# Patient Record
Sex: MALE | Race: WHITE | NOT HISPANIC OR LATINO | Employment: OTHER | URBAN - NONMETROPOLITAN AREA
[De-identification: names, ages, dates, MRNs, and addresses within clinical notes are randomized per-mention and may not be internally consistent; named-entity substitution may affect disease eponyms.]

---

## 2018-07-10 ENCOUNTER — HOSPITAL ENCOUNTER (INPATIENT)
Facility: HOSPITAL | Age: 48
LOS: 1 days | Discharge: HOME | End: 2018-07-11
Attending: EMERGENCY MEDICINE | Admitting: PSYCHIATRY & NEUROLOGY

## 2018-07-10 ENCOUNTER — APPOINTMENT (OUTPATIENT)
Dept: CARDIOLOGY | Facility: HOSPITAL | Age: 48
End: 2018-07-10
Attending: PSYCHIATRY & NEUROLOGY

## 2018-07-10 ENCOUNTER — APPOINTMENT (OUTPATIENT)
Dept: CT IMAGING | Facility: HOSPITAL | Age: 48
End: 2018-07-10

## 2018-07-10 ENCOUNTER — APPOINTMENT (OUTPATIENT)
Dept: MRI IMAGING | Facility: HOSPITAL | Age: 48
End: 2018-07-10

## 2018-07-10 DIAGNOSIS — R13.10 DYSPHAGIA, UNSPECIFIED TYPE: Primary | ICD-10-CM

## 2018-07-10 DIAGNOSIS — I63.9 CEREBROVASCULAR ACCIDENT (CVA), UNSPECIFIED MECHANISM (HCC): ICD-10-CM

## 2018-07-10 PROBLEM — IMO0002 STROKE SYNDROME: Status: ACTIVE | Noted: 2018-07-10

## 2018-07-10 LAB
ALBUMIN SERPL-MCNC: 4 G/DL (ref 3.5–5)
ALBUMIN SERPL-MCNC: 4.6 G/DL (ref 3.5–5)
ALBUMIN/GLOB SERPL: 1.5 G/DL (ref 1.1–2.5)
ALBUMIN/GLOB SERPL: 1.6 G/DL (ref 1.1–2.5)
ALP SERPL-CCNC: 71 U/L (ref 24–120)
ALP SERPL-CCNC: 75 U/L (ref 24–120)
ALT SERPL W P-5'-P-CCNC: 33 U/L (ref 0–54)
ALT SERPL W P-5'-P-CCNC: 39 U/L (ref 0–54)
ANION GAP SERPL CALCULATED.3IONS-SCNC: 12 MMOL/L (ref 4–13)
ANION GAP SERPL CALCULATED.3IONS-SCNC: 14 MMOL/L (ref 4–13)
APTT PPP: 28.1 SECONDS (ref 24.1–34.8)
AST SERPL-CCNC: 26 U/L (ref 7–45)
AST SERPL-CCNC: 26 U/L (ref 7–45)
BILIRUB SERPL-MCNC: 0.2 MG/DL (ref 0.1–1)
BILIRUB SERPL-MCNC: 0.4 MG/DL (ref 0.1–1)
BUN BLD-MCNC: 7 MG/DL (ref 5–21)
BUN BLD-MCNC: 8 MG/DL (ref 5–21)
BUN/CREAT SERPL: 7.6 (ref 7–25)
BUN/CREAT SERPL: 9.5 (ref 7–25)
CALCIUM SPEC-SCNC: 8.8 MG/DL (ref 8.4–10.4)
CALCIUM SPEC-SCNC: 9.3 MG/DL (ref 8.4–10.4)
CHLORIDE SERPL-SCNC: 103 MMOL/L (ref 98–110)
CHLORIDE SERPL-SCNC: 103 MMOL/L (ref 98–110)
CO2 SERPL-SCNC: 25 MMOL/L (ref 24–31)
CO2 SERPL-SCNC: 25 MMOL/L (ref 24–31)
CREAT BLD-MCNC: 0.84 MG/DL (ref 0.5–1.4)
CREAT BLD-MCNC: 0.92 MG/DL (ref 0.5–1.4)
DEPRECATED RDW RBC AUTO: 41.3 FL (ref 40–54)
DEPRECATED RDW RBC AUTO: 42.5 FL (ref 40–54)
EOSINOPHIL # BLD MANUAL: 0.11 10*3/MM3 (ref 0–0.7)
EOSINOPHIL NFR BLD MANUAL: 1 % (ref 0–4)
ERYTHROCYTE [DISTWIDTH] IN BLOOD BY AUTOMATED COUNT: 13.3 % (ref 12–15)
ERYTHROCYTE [DISTWIDTH] IN BLOOD BY AUTOMATED COUNT: 13.4 % (ref 12–15)
GFR SERPL CREATININE-BSD FRML MDRD: 87 ML/MIN/1.73
GFR SERPL CREATININE-BSD FRML MDRD: 97 ML/MIN/1.73
GLOBULIN UR ELPH-MCNC: 2.6 GM/DL
GLOBULIN UR ELPH-MCNC: 2.9 GM/DL
GLUCOSE BLD-MCNC: 102 MG/DL (ref 70–100)
GLUCOSE BLD-MCNC: 97 MG/DL (ref 70–100)
GLUCOSE BLDC GLUCOMTR-MCNC: 141 MG/DL (ref 70–130)
GLUCOSE BLDC GLUCOMTR-MCNC: 97 MG/DL (ref 70–130)
GLUCOSE BLDC GLUCOMTR-MCNC: 97 MG/DL (ref 70–130)
HCT VFR BLD AUTO: 42.5 % (ref 40–52)
HCT VFR BLD AUTO: 46.4 % (ref 40–52)
HGB BLD-MCNC: 14.6 G/DL (ref 14–18)
HGB BLD-MCNC: 16 G/DL (ref 14–18)
INR PPP: 0.87 (ref 0.91–1.09)
INR PPP: 0.88 (ref 0.91–1.09)
LYMPHOCYTES # BLD MANUAL: 3.7 10*3/MM3 (ref 0.72–4.86)
LYMPHOCYTES NFR BLD MANUAL: 34.7 % (ref 15–45)
LYMPHOCYTES NFR BLD MANUAL: 5.1 % (ref 4–12)
MCH RBC QN AUTO: 29.4 PG (ref 28–32)
MCH RBC QN AUTO: 29.7 PG (ref 28–32)
MCHC RBC AUTO-ENTMCNC: 34.4 G/DL (ref 33–36)
MCHC RBC AUTO-ENTMCNC: 34.5 G/DL (ref 33–36)
MCV RBC AUTO: 85.3 FL (ref 82–95)
MCV RBC AUTO: 86.4 FL (ref 82–95)
METAMYELOCYTES NFR BLD MANUAL: 1 % (ref 0–0)
MONOCYTES # BLD AUTO: 0.54 10*3/MM3 (ref 0.19–1.3)
NEUTROPHILS # BLD AUTO: 5.55 10*3/MM3 (ref 1.87–8.4)
NEUTROPHILS NFR BLD MANUAL: 51 % (ref 39–78)
NEUTS BAND NFR BLD MANUAL: 1 % (ref 0–10)
PLAT MORPH BLD: NORMAL
PLATELET # BLD AUTO: 381 10*3/MM3 (ref 130–400)
PLATELET # BLD AUTO: 387 10*3/MM3 (ref 130–400)
PMV BLD AUTO: 10 FL (ref 6–12)
PMV BLD AUTO: 10.1 FL (ref 6–12)
POTASSIUM BLD-SCNC: 4 MMOL/L (ref 3.5–5.3)
POTASSIUM BLD-SCNC: 4 MMOL/L (ref 3.5–5.3)
PROT SERPL-MCNC: 6.6 G/DL (ref 6.3–8.7)
PROT SERPL-MCNC: 7.5 G/DL (ref 6.3–8.7)
PROTHROMBIN TIME: 12.1 SECONDS (ref 11.9–14.6)
PROTHROMBIN TIME: 12.2 SECONDS (ref 11.9–14.6)
RBC # BLD AUTO: 4.92 10*6/MM3 (ref 4.8–5.9)
RBC # BLD AUTO: 5.44 10*6/MM3 (ref 4.8–5.9)
RBC MORPH BLD: NORMAL
SODIUM BLD-SCNC: 140 MMOL/L (ref 135–145)
SODIUM BLD-SCNC: 142 MMOL/L (ref 135–145)
VARIANT LYMPHS NFR BLD MANUAL: 6.1 % (ref 0–5)
WBC MORPH BLD: NORMAL
WBC NRBC COR # BLD: 10.66 10*3/MM3 (ref 4.8–10.8)
WBC NRBC COR # BLD: 10.8 10*3/MM3 (ref 4.8–10.8)

## 2018-07-10 PROCEDURE — 85027 COMPLETE CBC AUTOMATED: CPT | Performed by: PSYCHIATRY & NEUROLOGY

## 2018-07-10 PROCEDURE — 85007 BL SMEAR W/DIFF WBC COUNT: CPT | Performed by: EMERGENCY MEDICINE

## 2018-07-10 PROCEDURE — 92610 EVALUATE SWALLOWING FUNCTION: CPT

## 2018-07-10 PROCEDURE — 82962 GLUCOSE BLOOD TEST: CPT

## 2018-07-10 PROCEDURE — 80053 COMPREHEN METABOLIC PANEL: CPT | Performed by: EMERGENCY MEDICINE

## 2018-07-10 PROCEDURE — 85025 COMPLETE CBC W/AUTO DIFF WBC: CPT | Performed by: EMERGENCY MEDICINE

## 2018-07-10 PROCEDURE — 85610 PROTHROMBIN TIME: CPT | Performed by: PSYCHIATRY & NEUROLOGY

## 2018-07-10 PROCEDURE — 3E03317 INTRODUCTION OF OTHER THROMBOLYTIC INTO PERIPHERAL VEIN, PERCUTANEOUS APPROACH: ICD-10-PCS | Performed by: PSYCHIATRY & NEUROLOGY

## 2018-07-10 PROCEDURE — G8996 SWALLOW CURRENT STATUS: HCPCS

## 2018-07-10 PROCEDURE — 70551 MRI BRAIN STEM W/O DYE: CPT

## 2018-07-10 PROCEDURE — 85730 THROMBOPLASTIN TIME PARTIAL: CPT | Performed by: EMERGENCY MEDICINE

## 2018-07-10 PROCEDURE — 99291 CRITICAL CARE FIRST HOUR: CPT | Performed by: PSYCHIATRY & NEUROLOGY

## 2018-07-10 PROCEDURE — G8997 SWALLOW GOAL STATUS: HCPCS

## 2018-07-10 PROCEDURE — 70450 CT HEAD/BRAIN W/O DYE: CPT

## 2018-07-10 PROCEDURE — 25010000002 ALTEPLASE PER 1 MG: Performed by: PSYCHIATRY & NEUROLOGY

## 2018-07-10 PROCEDURE — 80053 COMPREHEN METABOLIC PANEL: CPT | Performed by: PSYCHIATRY & NEUROLOGY

## 2018-07-10 PROCEDURE — 99284 EMERGENCY DEPT VISIT MOD MDM: CPT

## 2018-07-10 PROCEDURE — 85610 PROTHROMBIN TIME: CPT | Performed by: EMERGENCY MEDICINE

## 2018-07-10 RX ORDER — CARBOXYMETHYLCELLULOSE SODIUM 5 MG/ML
1 SOLUTION/ DROPS OPHTHALMIC AS NEEDED
COMMUNITY

## 2018-07-10 RX ORDER — LABETALOL HYDROCHLORIDE 5 MG/ML
10 INJECTION, SOLUTION INTRAVENOUS
Status: DISCONTINUED | OUTPATIENT
Start: 2018-07-10 | End: 2018-07-11 | Stop reason: HOSPADM

## 2018-07-10 RX ORDER — ASPIRIN 300 MG/1
300 SUPPOSITORY RECTAL DAILY
Status: DISCONTINUED | OUTPATIENT
Start: 2018-07-11 | End: 2018-07-11 | Stop reason: HOSPADM

## 2018-07-10 RX ORDER — ASPIRIN 325 MG
325 TABLET ORAL DAILY
Status: DISCONTINUED | OUTPATIENT
Start: 2018-07-11 | End: 2018-07-11 | Stop reason: HOSPADM

## 2018-07-10 RX ORDER — HYDROCHLOROTHIAZIDE 25 MG/1
25 TABLET ORAL DAILY
COMMUNITY
End: 2019-07-01 | Stop reason: HOSPADM

## 2018-07-10 RX ORDER — LISINOPRIL 40 MG/1
40 TABLET ORAL DAILY
Status: ON HOLD | COMMUNITY
End: 2019-07-01 | Stop reason: SDUPTHER

## 2018-07-10 RX ORDER — SODIUM CHLORIDE 9 MG/ML
100 INJECTION, SOLUTION INTRAVENOUS ONCE
Status: COMPLETED | OUTPATIENT
Start: 2018-07-10 | End: 2018-07-10

## 2018-07-10 RX ORDER — IBUPROFEN 200 MG
100 TABLET ORAL EVERY 6 HOURS PRN
Status: ON HOLD | COMMUNITY
End: 2018-07-11

## 2018-07-10 RX ORDER — ACETAMINOPHEN 325 MG/1
650 TABLET ORAL EVERY 4 HOURS PRN
COMMUNITY
End: 2021-01-06 | Stop reason: DRUGHIGH

## 2018-07-10 RX ORDER — SODIUM CHLORIDE 0.9 % (FLUSH) 0.9 %
10 SYRINGE (ML) INJECTION AS NEEDED
Status: DISCONTINUED | OUTPATIENT
Start: 2018-07-10 | End: 2018-07-11 | Stop reason: HOSPADM

## 2018-07-10 RX ORDER — METHYLPHENIDATE HYDROCHLORIDE 5 MG/1
15 TABLET ORAL EVERY 12 HOURS SCHEDULED
COMMUNITY
End: 2019-08-16

## 2018-07-10 RX ORDER — ASPIRIN 325 MG
325 TABLET ORAL DAILY
COMMUNITY
End: 2019-06-17 | Stop reason: HOSPADM

## 2018-07-10 RX ORDER — OMEPRAZOLE 20 MG/1
20 CAPSULE, DELAYED RELEASE ORAL 2 TIMES DAILY
COMMUNITY
End: 2020-11-06

## 2018-07-10 RX ORDER — ATORVASTATIN CALCIUM 20 MG/1
10 TABLET, FILM COATED ORAL DAILY
COMMUNITY
End: 2019-06-17 | Stop reason: HOSPADM

## 2018-07-10 RX ORDER — SODIUM CHLORIDE 0.9 % (FLUSH) 0.9 %
1-10 SYRINGE (ML) INJECTION AS NEEDED
Status: DISCONTINUED | OUTPATIENT
Start: 2018-07-10 | End: 2018-07-11 | Stop reason: HOSPADM

## 2018-07-10 RX ORDER — ERGOCALCIFEROL (VITAMIN D2) 10 MCG
400 TABLET ORAL 2 TIMES DAILY
Status: ON HOLD | COMMUNITY
End: 2018-07-11

## 2018-07-10 RX ORDER — ATORVASTATIN CALCIUM 40 MG/1
80 TABLET, FILM COATED ORAL NIGHTLY
Status: DISCONTINUED | OUTPATIENT
Start: 2018-07-10 | End: 2018-07-11 | Stop reason: HOSPADM

## 2018-07-10 RX ADMIN — SODIUM CHLORIDE 100 ML: 9 INJECTION, SOLUTION INTRAVENOUS at 16:21

## 2018-07-10 RX ADMIN — Medication 71.4 MG: at 13:39

## 2018-07-10 RX ADMIN — DESMOPRESSIN ACETATE 80 MG: 0.2 TABLET ORAL at 20:52

## 2018-07-10 NOTE — ED PROVIDER NOTES
Subjective   Patient had sudden onset of left sided facial weakness at 11:50 AM.  Had headache yesterday.        History provided by:  Patient and spouse   used: No    Neurologic Problem   The patient's primary symptoms include focal weakness. This is a new problem. The current episode started today. The neurological problem developed suddenly. The last time the patient was known to be well was 7/10/2018 11:50 AM.  The problem is unchanged. There was left-sided and facial focality noted. Pertinent negatives include no abdominal pain, auditory change, aura, back pain, bladder incontinence, bowel incontinence, chest pain, confusion, diaphoresis, dizziness, fatigue, fever, headaches, light-headedness, nausea, neck pain, palpitations, shortness of breath, vertigo or vomiting. Past treatments include nothing. The treatment provided no relief. There is no history of a bleeding disorder, a clotting disorder, a CVA, dementia, head trauma, liver disease, mood changes or seizures.       Review of Systems   Constitutional: Negative.  Negative for diaphoresis, fatigue and fever.   HENT: Negative.    Respiratory: Negative.  Negative for shortness of breath.    Cardiovascular: Negative.  Negative for chest pain and palpitations.   Gastrointestinal: Negative.  Negative for abdominal pain, bowel incontinence, nausea and vomiting.   Genitourinary: Negative.  Negative for bladder incontinence.   Musculoskeletal: Negative.  Negative for back pain and neck pain.   Skin: Negative.    Neurological: Positive for focal weakness. Negative for dizziness, vertigo, light-headedness and headaches.   Hematological: Negative.    Psychiatric/Behavioral: Negative.  Negative for confusion.   All other systems reviewed and are negative.      History reviewed. No pertinent past medical history.    No Known Allergies    History reviewed. No pertinent surgical history.    History reviewed. No pertinent family history.    Social  History     Social History   • Marital status: Single     Social History Main Topics   • Drug use: Unknown     Other Topics Concern   • Not on file       Prior to Admission medications    Not on File       Medications   sodium chloride 0.9 % flush 10 mL (not administered)   alteplase (ACTIVASE) 100 MG injection  - ADS Override Pull (not administered)   sodium chloride 0.9 % flush 1-10 mL (not administered)   atorvastatin (LIPITOR) tablet 80 mg (not administered)   aspirin tablet 325 mg (not administered)     Or   aspirin suppository 300 mg (not administered)   labetalol (NORMODYNE,TRANDATE) 200 mg in sodium chloride 0.9 % 200 mL (1 mg/mL) infusion (not administered)     And   labetalol (NORMODYNE,TRANDATE) injection 10 mg (not administered)   sodium chloride 0.9 % infusion 100 mL (not administered)   alteplase (ACTIVASE) 0.09 mg/kg = 8 mg in sterile water (preservative free) 8 mL bolus (8 mg Intravenous Bolus from Bag 7/10/18 1338)   alteplase (ACTIVASE) 71 mg in sterile water (preservative free) 71 mL (1 mg/mL) infusion (0 mg/kg × 88.2 kg Intravenous Stopped 7/10/18 1437)       Vitals:    07/10/18 1550   BP: 121/84   Pulse: 92   Resp:    Temp:    SpO2: 96%         Objective   Physical Exam   Constitutional: He is oriented to person, place, and time. He appears well-developed and well-nourished.   HENT:   Head: Normocephalic and atraumatic.   Eyes: EOM are normal. Pupils are equal, round, and reactive to light.   Neck: Normal range of motion. Neck supple.   Cardiovascular: Normal rate and regular rhythm.    Pulmonary/Chest: Effort normal and breath sounds normal.   Abdominal: Soft. Bowel sounds are normal.   Musculoskeletal: Normal range of motion.   Neurological: He is alert and oriented to person, place, and time.   Left facial droop with flat left forehead.  Good use of extremities.   Skin: Skin is warm and dry.   Psychiatric: He has a normal mood and affect. His behavior is normal.   Nursing note and vitals  reviewed.      Procedures         Lab Results (last 24 hours)     Procedure Component Value Units Date/Time    POC Glucose Once [[id number]]  (Normal) Collected:  07/10/18 1330    Specimen:  Blood Updated:  07/10/18 1341     Glucose 97 mg/dL      Comment: : [id number] Helen CoxMeter ID: [id number]       CBC & Differential [[id number]] Collected:  07/10/18 1334    Specimen:  Blood Updated:  07/10/18 1421    Narrative:       The following orders were created for panel order CBC & Differential.  Procedure                               Abnormality         Status                     ---------                               -----------         ------                     Manual Differential[[id number]]          Abnormal            Final result               CBC Auto Differential[[id number]]        Normal              Final result                 Please view results for these tests on the individual orders.    Comprehensive Metabolic Panel [[id number]]  (Abnormal) Collected:  07/10/18 1334    Specimen:  Blood Updated:  07/10/18 1355     Glucose 97 mg/dL      BUN 7 mg/dL      Creatinine 0.92 mg/dL      Sodium 142 mmol/L      Potassium 4.0 mmol/L      Chloride 103 mmol/L      CO2 25.0 mmol/L      Calcium 9.3 mg/dL      Total Protein 7.5 g/dL      Albumin 4.60 g/dL      ALT (SGPT) 39 U/L      AST (SGOT) 26 U/L      Alkaline Phosphatase 75 U/L      Total Bilirubin 0.4 mg/dL      eGFR Non African Amer 87 mL/min/1.73      Globulin 2.9 gm/dL      A/G Ratio 1.6 g/dL      BUN/Creatinine Ratio 7.6     Anion Gap 14.0 (H) mmol/L     Protime-INR [[id number]]  (Abnormal) Collected:  07/10/18 1334    Specimen:  Blood Updated:  07/10/18 1357     Protime 12.1 Seconds      INR 0.87 (L)    aPTT [[id number]]  (Normal) Collected:  07/10/18 1334    Specimen:  Blood Updated:  07/10/18 1357     PTT 28.1 seconds     CBC Auto Differential [[id number]]  (Normal) Collected:  07/10/18 1334    Specimen:  Blood Updated:  07/10/18 1421     WBC 10.66  10*3/mm3      RBC 5.44 10*6/mm3      Hemoglobin 16.0 g/dL      Hematocrit 46.4 %      MCV 85.3 fL      MCH 29.4 pg      MCHC 34.5 g/dL      RDW 13.3 %      RDW-SD 41.3 fl      MPV 10.1 fL      Platelets 387 10*3/mm3     Narrative:       The previously reported component NRBC is no longer being reported.    Manual Differential [[id number]]  (Abnormal) Collected:  07/10/18 1334    Specimen:  Blood Updated:  07/10/18 1421     Neutrophil % 51.0 %      Lymphocyte % 34.7 %      Monocyte % 5.1 %      Eosinophil % 1.0 %      Bands %  1.0 %      Metamyelocyte % 1.0 (H) %      Atypical Lymphocyte % 6.1 (H) %      Neutrophils Absolute 5.55 10*3/mm3      Lymphocytes Absolute 3.70 10*3/mm3      Monocytes Absolute 0.54 10*3/mm3      Eosinophils Absolute 0.11 10*3/mm3      RBC Morphology Normal     WBC Morphology Normal     Platelet Morphology Normal          MRI Brain Without Contrast   Final Result   1. Negative MR imaging the brain.   This report was finalized on 07/10/2018 15:26 by Dr. Geronimo Gaytan MD.      CT Head Without Contrast   Final Result   Impression: No acute intracranial abnormality. MRI is more sensitive for   acute stroke.       REPORT called to Dr. Nelson in the emergency department at the time of   dictation (1335 hours 7/10/2018).           This report was finalized on 07/10/2018 13:35 by Dr. Germán Bruner MD.      CT Head Without Contrast    (Results Pending)   US Carotid Bilateral    (Results Pending)       ED Course  ED Course as of Jul 10 1612   Tue Jul 10, 2018   1610 Code stroke was called and Dr. Beebe responded and gave patient TPA.  [TR]      ED Course User Index  [TR] Osito Nelson Jr., MD          MDM  Number of Diagnoses or Management Options  Cerebrovascular accident (CVA), unspecified mechanism (CMS/HCC): new and requires workup     Amount and/or Complexity of Data Reviewed  Clinical lab tests: ordered and reviewed  Tests in the radiology section of CPT®: ordered and reviewed  Tests in the  medicine section of CPT®: reviewed and ordered  Discuss the patient with other providers: yes    Risk of Complications, Morbidity, and/or Mortality  Presenting problems: high  Diagnostic procedures: moderate  Management options: high    Patient Progress  Patient progress: stable      Final diagnoses:   Cerebrovascular accident (CVA), unspecified mechanism (CMS/HCC)          Osito Nelson Jr., MD  07/10/18 2600

## 2018-07-10 NOTE — THERAPY EVALUATION
Acute Care - Speech Language Pathology   Swallow Initial Evaluation [facility]     Patient Name: Irwin Esparza  : 1970  MRN: [id number]  Today's Date: 7/10/2018               Admit Date: 7/10/2018  CBSE complete. Pt alert and oriented x4. Left labial droop present, however speech is clear. He was presented with a full range of PO consistencies except for mechanical soft solids. Adequate oral preperation and a timely swallow noted with minial oral residue present which was cleared with a thin liquid wash. He does express decrease in oral sensation on left side, yet no anterior loss or pocketing of bolus noted. Adequate laryngeal elevation and a timely swallow was exhibited with all trials. Pt's voice was noted to be strong and clear throughout evaluation with no overt s/s of aspiration present. Pt ok for a regular diet with thin liquids. Meds ok to be administered whole with thin liquids. SLP will follow to ensure diet tolerance.  Haider Harper MS CCC-SLP 7/10/2018 4:19 PM    Visit Dx:     ICD-10-CM ICD-9-CM   1. Dysphagia, unspecified type R13.10 787.20   2. Cerebrovascular accident (CVA), unspecified mechanism (CMS/HCC) I63.9 434.91     Patient Active Problem List   Diagnosis   • Stroke syndrome (CMS/HCC)     History reviewed. No pertinent past medical history.  History reviewed. No pertinent surgical history.       SWALLOW EVALUATION (last 72 hours)      SLP Adult Swallow Evaluation     Row Name 07/10/18 1520                   Rehab Evaluation    Document Type evaluation  -CS        Subjective Information no complaints  -CS        Patient Observations alert;cooperative  -CS        Patient/Family Observations Family present  -CS        Patient Effort good  -CS           General Information    Patient Profile Reviewed yes  -CS        Pertinent History Of Current Problem Left facial droop  -CS        Current Method of Nutrition NPO  -CS        Precautions/Limitations, Vision WFL;for purposes of eval  -CS         Precautions/Limitations, Hearing WFL  -CS        Prior Level of Function-Communication WFL  -CS        Prior Level of Function-Swallowing no diet consistency restrictions  -CS        Plans/Goals Discussed with patient  -CS        Barriers to Rehab none identified  -CS           Pain Assessment    Additional Documentation Pain Scale: Numbers Pre/Post-Treatment (Group)  -CS           Pain Scale: Numbers Pre/Post-Treatment    Pain Scale: Numbers, Pretreatment 0/10 - no pain  -CS        Pain Scale: Numbers, Post-Treatment 0/10 - no pain  -CS           Oral Motor and Function    Dentition Assessment natural, present and adequate  -CS        Secretion Management WNL/WFL  -CS        Mucosal Quality moist, healthy  -CS        Volitional Swallow WFL  -CS        Volitional Cough WFL  -CS           Oral Musculature and Cranial Nerve Assessment    Oral Motor General Assessment oral labial or buccal impairment  -CS        Oral Labial or Buccal Impairment, Detail, Cranial Nerve VII (Facial): CN7: Motor Impairment;left labial droop  -CS           General Eating/Swallowing Observations    Respiratory Support Currently in Use room air  -CS        Eating/Swallowing Skills self-fed;appropriate self-feeding skills observed  -CS        Positioning During Eating upright in bed  -CS        Utensils Used spoon;straw  -CS        Consistencies Trialed regular textures;pudding thick;honey-thick liquids;nectar/syrup-thick liquids;thin liquids  -CS           Respiratory    Respiratory Status WFL  -CS           Clinical Swallow Eval    Oral Prep Phase WFL  -CS        Oral Residue WFL  -CS        Pharyngeal Phase no overt signs/symptoms of pharyngeal impairment  -CS        Clinical Swallow Evaluation Summary CBSE complete. Pt alert and oriented x4. Left labial droop present, however speech is clear. He was presented with a full range of PO consistencies except for mechanical soft solids. Adequate oral preperation and a timely swallow noted  with minial oral residue present which was cleared with a thin liquid wash. He does express decrease in oral sensation on left side, yet no anterior loss or pocketing of bolus noted. Adequate laryngeal elevation and a timely swallow was exhibited with all trials. Pt's voice was noted to be strong and clear throughout evaluation with no overt s/s of aspiration present. Pt ok for a regular diet with thin liquids. Meds ok to be administered whole with thin liquids. SLP will follow to ensure diet tolerance.  -CS           Clinical Impression    SLP Swallowing Diagnosis functional oral phase;functional pharyngeal phase  -CS        Functional Impact risk of aspiration/pneumonia  -CS        Rehab Potential/Prognosis, Swallowing good, to achieve stated therapy goals  -CS        Criteria for Skilled Therapeutic Interventions Met demonstrates skilled criteria  -CS           Recommendations    Therapy Frequency (Swallow) PRN  -CS        Predicted Duration Therapy Intervention (Days) until discharge  -CS        SLP Diet Recommendation regular textures;thin liquids  -CS        SLP Rec. for Method of Medication Administration meds whole;with thin liquids  -CS        Monitor for Signs of Aspiration yes;notify SLP if any concerns  -CS        Anticipated Dischage Disposition home  -CS           Swallow Goals (SLP)    Oral Nutrition/Hydration Goal Selection (SLP) oral nutrition/hydration, SLP goal 1  -CS           Oral Nutrition/Hydration Goal 1 (SLP)    Oral Nutrition/Hydration Goal 1, SLP Pt will toelrate LRD w/o any overt s/s of aspiration.  -CS        Time Frame (Oral Nutrition/Hydration Goal 1, SLP) by discharge  -CS        Barriers (Oral Nutrition/Hydration Goal 1, SLP) n/a  -CS        Progress/Outcomes (Oral Nutrition/Hydration Goal 1, SLP) goal ongoing  -CS          User Key  (r) = Recorded By, (t) = Taken By, (c) = Cosigned By    Initials Name Effective Dates    CS Haider Harper MS CCC-SLP 04/03/18 -         EDUCATION  The  patient has been educated in the following areas:   Dysphagia (Swallowing Impairment).    SLP Recommendation and Plan  SLP Swallowing Diagnosis: functional oral phase, functional pharyngeal phase  SLP Diet Recommendation: regular textures, thin liquids        Monitor for Signs of Aspiration: yes, notify SLP if any concerns     Criteria for Skilled Therapeutic Interventions Met: demonstrates skilled criteria  Anticipated Dischage Disposition: home  Rehab Potential/Prognosis, Swallowing: good, to achieve stated therapy goals  Therapy Frequency (Swallow): PRN  Predicted Duration Therapy Intervention (Days): until discharge       Plan of Care Reviewed With: patient  Plan of Care Review  Plan of Care Reviewed With: patient  Progress: no change (Initial evaluation)  Outcome Summary: CBSE complete. Pt alert and oriented x4. Left labial droop present, however speech is clear. He was presented with a full range of PO consistencies except for mechanical soft solids. Adequate oral preperation and a timely swallow noted with minial oral residue present which was cleared with a thin liquid wash. He does express decrease in oral sensation on left side, yet no anterior loss or pocketing of bolus noted. Adequate laryngeal elevation and a timely swallow was exhibited with all trials. Pt's voice was noted to be strong and clear throughout evaluation with no overt s/s of aspiration present. Pt ok for a regular diet with thin liquids. Meds ok to be administered whole with thin liquids. SLP will follow to ensure diet tolerance.          SLP GOALS     Row Name 07/10/18 8515             Oral Nutrition/Hydration Goal 1 (SLP)    Oral Nutrition/Hydration Goal 1, SLP Pt will toelrate LRD w/o any overt s/s of aspiration.  -CS      Time Frame (Oral Nutrition/Hydration Goal 1, SLP) by discharge  -CS      Barriers (Oral Nutrition/Hydration Goal 1, SLP) n/a  -CS      Progress/Outcomes (Oral Nutrition/Hydration Goal 1, SLP) goal ongoing  -CS         User Key  (r) = Recorded By, (t) = Taken By, (c) = Cosigned By    Initials Name Provider Type    MICHAEL Harper MS CCC-SLP Speech and Language Pathologist             SLP Outcome Measures (last 72 hours)      SLP Outcome Measures     Row Name 07/10/18 1520             SLP Outcome Measures    Outcome Measure Used? Adult NOMS  -CS         FCM Scores    FCM Chosen Swallowing  -CS      Swallowing FCM Score 6  -CS        User Key  (r) = Recorded By, (t) = Taken By, (c) = Cosigned By    Initials Name Effective Dates     Haider Harper MS CCC-SLP 04/03/18 -            Time Calculation:         Time Calculation- SLP     Row Name 07/10/18 1618             Time Calculation- SLP    SLP Start Time 1520  -CS      SLP Stop Time 1618  -CS      SLP Time Calculation (min) 58 min  -CS      SLP Received On 07/10/18  -CS      SLP Goal Re-Cert Due Date 07/20/18  -        User Key  (r) = Recorded By, (t) = Taken By, (c) = Cosigned By    Initials Name Provider Type    MICHAEL Harper MS CCC-SLP Speech and Language Pathologist          Therapy Charges for Today     Code Description Service Date Service Provider Modifiers Qty    75187243715 HC ST SWALLOWING CURRENT STATUS 7/10/2018 Haider Harper MS CCC-SLP GN, CI 1    88657682059 HC ST SWALLOWING PROJECTED 7/10/2018 Haider Harper MS CCC-SLP GN, CH 1    02294348512 HC ST EVAL ORAL PHARYNG SWALLOW 4 7/10/2018 Haider Harper MS CCC-SLP GN 1          SLP G-Codes  SLP NOMS Used?: Yes  Functional Limitations: Swallowing  Swallow Current Status (): At least 1 percent but less than 20 percent impaired, limited or restricted  Swallow Goal Status (): 0 percent impaired, limited or restricted    Haider Harper MS CCC-SLP  7/10/2018

## 2018-07-10 NOTE — H&P
Neurology History & Physical    Indication for Admission: Dr. FLASH Nelson    History of present illness:    This is a right-handed 47-year-old  male with stroke risk factors including tobacco usage, hypertension, hyperlipidemia who presents as a code stroke.  His last seen normal time was 11:50 AM this morning.  He was at work and was unremarkable.  He had a sudden onset of left facial numbness, left lower facial droop, and may have had some left arm and leg numbness.  He denies weakness.  He denies vision changes.  He denies a headache.  He has no previous history of strokes.  [facility]  A stat head CT without contrast was performed and was reviewed by me.  There were no signs of hemorrhage.  I discussed the risks and benefits of tPA with the patient and his wife who is at bedside.  I did discuss that there was a chance that this may represent a Bell's palsy.  Secondary to the fact that he did have some subjective left sided numbness on his arm and leg on examination I could not completely rule out an ischemic stroke.  I explained that if we did wait to know for sure he would be too late to give TPA.  Him and his wife expressed understanding and wanted to proceed with TPA.  He had no contraindications and therefore was given TPA.      Allergies not on file   No known drug allergies     (Not in a hospital admission)  medication reconciliation currently in progress .  He currently does take aspirin 325 mg twice per day status post a knee surgery several months ago but often misses this medication.    Social History     Social History   • Marital status: Single     Spouse name: N/A   • Number of children: N/A   • Years of education: N/A     Occupational History   • Not on file.     Social History Main Topics   • Smoking status: Not on file   • Smokeless tobacco: Not on file   • Alcohol use Not on file   • Drug use: Unknown   • Sexual activity: Not on file     Other  Topics Concern   • Not on file     Social History Narrative   • No narrative on file     No family history on file.  The patient smokes one pack per day.  He does not use alcohol to excess.  He was at this wife.      Family history negative for early heart disease and stroke   Review of Systems  Review of Systems - of 14 point review of systems was performed and was positive for left lower facial droop and mild dysarthria     Vital Signs        General Exam:  Head:  Normal cephalic, atraumatic  HEENT:  Neck supple  Fundoscopic Exam:  No signs of disc edema  CVS:  Regular rate and rhythm.  No murmurs  Carotid Examination:  No bruits  Lungs:  Clear to auscultation  Abdomen:  Non-tender, Non-distended  Extremities:  No signs of peripheral edema  Skin:  No rashes    Neurologic Exam:    Mental Status:    -Awake, Alert, Oriented X 3  -No word finding difficulties  -No aphasia  -Mild dysarthria  -Follows simple and complex commands    CN II:  Visual fields full.  Pupils equally reactive to light  CN III, IV, VI:  Extraocular Muscles full with no signs of nystagmus  CN V:  Facial sensory is symmetric with no asymmetries.  CN VII:  he definitively has a left lower facial droop.  To some extent he has some upper involvement as well but it is not complete.  His left eyebrow does elevate somewhat.  He can wrinkle before had somewhat as well.    CN VIII:  Gross hearing intact bilaterally  CN IX:  Palate elevates symmetrically  CN X:  Palate elevates symmetrically  CN XI:  Shoulder shrug symmetric  CN XII:  Tongue is midline on protrusion    Motor: (strength out of 5:  1= minimal movement, 2 = movement in plane of gravity, 3 = movement against gravity, 4 = movement against some resistance, 5 = full strength)    -Right Upper Ext: Proximal: 5 Distal: 5  -Left Upper Ext: Proximal: 5 Distal: 5    -Right Lower Ext: Proximal: 5 Distal: 5  -Left Lower Ext: Proximal: 5 Distal: 5    DTR:  -Right   Bicep: 2+ Tricep: 2+ Brachoradialis:  2+   Patella: 2+ Ankle: 2+ Neg Babinski  -Left   Bicep: 2+ Tricep: 2+ Brachoradialis: 2+   Patella: 2+ Ankle: 2+ Neg Babinski    Sensory:  -Subjective decreased sensation to light touch in the left arm and leg when compared to the right     Coordination:  -Finger to nose intact  -Heel to shin intact  -No ataxia    Gait  -Nonambulatory secondary to tPA    Results Review:  Lab Results (last 7 days)     Procedure Component Value Units Date/Time    CBC Auto Differential [[id number]]  (Normal) Collected:  07/10/18 1334    Specimen:  Blood Updated:  07/10/18 1346     WBC 10.66 10*3/mm3      RBC 5.44 10*6/mm3      Hemoglobin 16.0 g/dL      Hematocrit 46.4 %      MCV 85.3 fL      MCH 29.4 pg      MCHC 34.5 g/dL      RDW 13.3 %      RDW-SD 41.3 fl      MPV 10.1 fL      Platelets 387 10*3/mm3      nRBC 0.0 /100 WBC     Manual Differential [[id number]] Collected:  07/10/18 1334    Specimen:  Blood Updated:  07/10/18 1346    CBC & Differential [[id number]] Collected:  07/10/18 1334    Specimen:  Blood Updated:  07/10/18 1346    Narrative:       The following orders were created for panel order CBC & Differential.  Procedure                               Abnormality         Status                     ---------                               -----------         ------                     Manual Differential[[id number]]                              In process                 CBC Auto Differential[[id number]]        Normal              Preliminary result           Please view results for these tests on the individual orders.    Comprehensive Metabolic Panel [[id number]] Collected:  07/10/18 1334    Specimen:  Blood Updated:  07/10/18 1341    Protime-INR [[id number]] Collected:  07/10/18 1334    Specimen:  Blood Updated:  07/10/18 1341    aPTT [[id number]] Collected:  07/10/18 1334    Specimen:  Blood Updated:  07/10/18 1341    POC Glucose Once [[id number]]  (Normal) Collected:  07/10/18 1330    Specimen:  Blood Updated:  07/10/18 1341      Glucose 97 mg/dL      Comment: : [id number] Helen CoxMeter ID: [id number]           Patient Active Problem List   Diagnosis   • Stroke syndrome (CMS/HCC)     CT of head without contrast reviewed by me.  No acute findings.        Impression:    1.  Left facial droop:  There are components that would be consistent with a Bell's palsy; however, he has multiple stroke risk factors and was complaining of decreased sensation left arm and leg in addition to his facial deficits.  It was because of this that I could not completely rule out a stroke and offered TPA.    2.  Hypertension   3.  Hyperlipidemia   4.  Tobacco usage     Plan:    · TPA administered  · will follow 24 hour tPA precautions  · Admit to intensive care unit  · MR brain without contrast  · Will likely discontinue stroke workup if his MRIs negative for stroke  · Therapy consultations      45 minutes of critical care time was performed as the patient had acute changes in his neurologic status.  I perform rapid interpretation of imaging, rapid neurologic exam, and discussed risk and benefits of tPA.  Srikanth Beebe MD  07/10/18  1:49 PM

## 2018-07-10 NOTE — PLAN OF CARE
Problem: Patient Care Overview  Goal: Plan of Care Review  Outcome: Ongoing (interventions implemented as appropriate)   07/10/18 1549   Coping/Psychosocial   Plan of Care Reviewed With patient   Plan of Care Review   Progress no change  (Initial evaluation)   OTHER   Outcome Summary CBSE complete. Pt alert and oriented x4. Left labial droop present, however speech is clear. He was presented with a full range of PO consistencies except for mechanical soft solids. Adequate oral preperation and a timely swallow noted with minial oral residue present which was cleared with a thin liquid wash. He does express decrease in oral sensation on left side, yet no anterior loss or pocketing of bolus noted. Adequate laryngeal elevation and a timely swallow was exhibited with all trials. Pt's voice was noted to be strong and clear throughout evaluation with no overt s/s of aspiration present. Pt ok for a regular diet with thin liquids. Meds ok to be administered whole with thin liquids. SLP will follow to ensure diet tolerance.

## 2018-07-11 ENCOUNTER — APPOINTMENT (OUTPATIENT)
Dept: CT IMAGING | Facility: HOSPITAL | Age: 48
End: 2018-07-11

## 2018-07-11 VITALS
HEIGHT: 65 IN | SYSTOLIC BLOOD PRESSURE: 140 MMHG | TEMPERATURE: 97.2 F | OXYGEN SATURATION: 98 % | BODY MASS INDEX: 31.4 KG/M2 | DIASTOLIC BLOOD PRESSURE: 93 MMHG | RESPIRATION RATE: 18 BRPM | WEIGHT: 188.5 LBS | HEART RATE: 69 BPM

## 2018-07-11 LAB
ALBUMIN SERPL-MCNC: 4 G/DL (ref 3.5–5)
ALBUMIN/GLOB SERPL: 1.5 G/DL (ref 1.1–2.5)
ALP SERPL-CCNC: 77 U/L (ref 24–120)
ALT SERPL W P-5'-P-CCNC: 31 U/L (ref 0–54)
ANION GAP SERPL CALCULATED.3IONS-SCNC: 11 MMOL/L (ref 4–13)
ARTICHOKE IGE QN: 102 MG/DL (ref 0–99)
AST SERPL-CCNC: 24 U/L (ref 7–45)
BILIRUB SERPL-MCNC: 0.2 MG/DL (ref 0.1–1)
BUN BLD-MCNC: 8 MG/DL (ref 5–21)
BUN/CREAT SERPL: 8.8 (ref 7–25)
CALCIUM SPEC-SCNC: 9.4 MG/DL (ref 8.4–10.4)
CHLORIDE SERPL-SCNC: 100 MMOL/L (ref 98–110)
CHOLEST SERPL-MCNC: 167 MG/DL (ref 130–200)
CO2 SERPL-SCNC: 29 MMOL/L (ref 24–31)
CREAT BLD-MCNC: 0.91 MG/DL (ref 0.5–1.4)
DEPRECATED RDW RBC AUTO: 41.7 FL (ref 40–54)
ERYTHROCYTE [DISTWIDTH] IN BLOOD BY AUTOMATED COUNT: 13.2 % (ref 12–15)
GFR SERPL CREATININE-BSD FRML MDRD: 89 ML/MIN/1.73
GLOBULIN UR ELPH-MCNC: 2.7 GM/DL
GLUCOSE BLD-MCNC: 109 MG/DL (ref 70–100)
GLUCOSE BLDC GLUCOMTR-MCNC: 108 MG/DL (ref 70–130)
GLUCOSE BLDC GLUCOMTR-MCNC: 110 MG/DL (ref 70–130)
GLUCOSE BLDC GLUCOMTR-MCNC: 95 MG/DL (ref 70–130)
HBA1C MFR BLD: 5.6 %
HCT VFR BLD AUTO: 43.2 % (ref 40–52)
HDLC SERPL-MCNC: 27 MG/DL
HGB BLD-MCNC: 14.6 G/DL (ref 14–18)
INR PPP: 0.9 (ref 0.91–1.09)
LDLC/HDLC SERPL: 2.23 {RATIO}
MCH RBC QN AUTO: 29.3 PG (ref 28–32)
MCHC RBC AUTO-ENTMCNC: 33.8 G/DL (ref 33–36)
MCV RBC AUTO: 86.7 FL (ref 82–95)
PLATELET # BLD AUTO: 365 10*3/MM3 (ref 130–400)
PMV BLD AUTO: 10.2 FL (ref 6–12)
POTASSIUM BLD-SCNC: 4 MMOL/L (ref 3.5–5.3)
PROT SERPL-MCNC: 6.7 G/DL (ref 6.3–8.7)
PROTHROMBIN TIME: 12.4 SECONDS (ref 11.9–14.6)
RBC # BLD AUTO: 4.98 10*6/MM3 (ref 4.8–5.9)
SODIUM BLD-SCNC: 140 MMOL/L (ref 135–145)
TRIGL SERPL-MCNC: 399 MG/DL (ref 0–149)
WBC NRBC COR # BLD: 10.13 10*3/MM3 (ref 4.8–10.8)

## 2018-07-11 PROCEDURE — 99239 HOSP IP/OBS DSCHRG MGMT >30: CPT | Performed by: PSYCHIATRY & NEUROLOGY

## 2018-07-11 PROCEDURE — 94760 N-INVAS EAR/PLS OXIMETRY 1: CPT

## 2018-07-11 PROCEDURE — 94799 UNLISTED PULMONARY SVC/PX: CPT

## 2018-07-11 PROCEDURE — 80061 LIPID PANEL: CPT | Performed by: PSYCHIATRY & NEUROLOGY

## 2018-07-11 PROCEDURE — 92526 ORAL FUNCTION THERAPY: CPT

## 2018-07-11 PROCEDURE — 82962 GLUCOSE BLOOD TEST: CPT

## 2018-07-11 PROCEDURE — G8997 SWALLOW GOAL STATUS: HCPCS

## 2018-07-11 PROCEDURE — 70450 CT HEAD/BRAIN W/O DYE: CPT

## 2018-07-11 PROCEDURE — 83036 HEMOGLOBIN GLYCOSYLATED A1C: CPT | Performed by: PSYCHIATRY & NEUROLOGY

## 2018-07-11 PROCEDURE — 80053 COMPREHEN METABOLIC PANEL: CPT | Performed by: PSYCHIATRY & NEUROLOGY

## 2018-07-11 PROCEDURE — 85027 COMPLETE CBC AUTOMATED: CPT | Performed by: PSYCHIATRY & NEUROLOGY

## 2018-07-11 PROCEDURE — 85610 PROTHROMBIN TIME: CPT | Performed by: PSYCHIATRY & NEUROLOGY

## 2018-07-11 PROCEDURE — G8998 SWALLOW D/C STATUS: HCPCS

## 2018-07-11 PROCEDURE — 99406 BEHAV CHNG SMOKING 3-10 MIN: CPT

## 2018-07-11 RX ORDER — PREDNISONE 10 MG/1
10 TABLET ORAL DAILY
Qty: 30 TABLET | Refills: 0 | Status: SHIPPED | OUTPATIENT
Start: 2018-07-11 | End: 2019-06-17 | Stop reason: HOSPADM

## 2018-07-11 RX ORDER — DICLOFENAC SODIUM 75 MG/1
75 TABLET, DELAYED RELEASE ORAL 2 TIMES DAILY
COMMUNITY
End: 2019-06-13

## 2018-07-11 RX ADMIN — ASPIRIN 325 MG: 325 TABLET, COATED ORAL at 15:33

## 2018-07-11 NOTE — THERAPY DISCHARGE NOTE
Acute Care - Speech Language Pathology   Swallow Treatment Note/Discharge    [facility]     Patient Name: Irwin Esparza  : 1970  MRN: [id number]  Today's Date: 2018               Admit Date: 7/10/2018  SLP treatment complete. Pt reports no difficulties with PO intake except for occasional anterior loss of bolus from left side. He completed trials of water w/o any overt s/s of aspiration. SLP explained the benefit for neuromuscular electrical stimulation to be completed as an outpatient in order to improve facial droop. Pt verbalized understanding. SLP is signing off at this time. MD to re-consult if an issue arises which requires SLP services.   MS MARIYA Henley 2018 12:49 PM    Visit Dx:      ICD-10-CM ICD-9-CM   1. Dysphagia, unspecified type R13.10 787.20   2. Cerebrovascular accident (CVA), unspecified mechanism (CMS/HCC) I63.9 434.91     Patient Active Problem List   Diagnosis   • Stroke syndrome (CMS/HCC)       Therapy Treatment    Therapy Treatment / Health Promotion    Treatment Time/Intention  Discipline: speech language pathologist (18 1040 : MS MARIYA Prieto)  Document Type: therapy note (daily note) (18 1040 : MS MARIYA Prieto)  Subjective Information: no complaints (18 1040 : MS MARIYA Prieto)  Mode of Treatment: speech-language pathology (18 1040 : MS MARIYA Prieto)  Patient/Family Observations: No family present (18 1040 : MS MARIYA Prieto)  Patient Effort: good (18 1040 : MS MARIYA Prieto)  Plan of Care Review  Plan of Care Reviewed With: patient (18 1240 : MS MARIYA Prieto)    Vitals/Pain/Safety  Pain Assessment  Additional Documentation: Pain Scale: Numbers Pre/Post-Treatment (Group) (18 1040 : MS MARIYA Prieto)  Pain Scale: Numbers Pre/Post-Treatment  Pain Scale: Numbers, Pretreatment: 0/10 - no pain (18 1040 : MS MARIYA Prieto)  Pain Scale:  Numbers, Post-Treatment: 0/10 - no pain (07/11/18 1040 : Haider Harper MS CCC-SLP)    Cognition, Communication, Swallow  Recommendations  Anticipated Dischage Disposition: home (07/11/18 1246 : Haider Harper MS CCC-SLP)    Outcome Summary  Outcome Summary/Treatment Plan (SLP)  Daily Summary of Progress (SLP): progress toward functional goals as expected (07/11/18 1040 : Haider Harper MS CCC-SLP)  Barriers to Overall Progress (SLP): n/a (07/11/18 1040 : Haider Harper MS CCC-SLP)  Plan for Continued Treatment (SLP): D/C (07/11/18 1040 : Haider Harper MS CCC-SLP)  Anticipated Dischage Disposition: home (07/11/18 1246 : Haider Harper MS CCC-SLP)  Reason for Discharge: all goals and outcomes met, no further needs identified (07/11/18 1246 : Haider Harper MS CCC-SLP)        SLP GOALS     Row Name 07/11/18 1040 07/10/18 1520          Oral Nutrition/Hydration Goal 1 (SLP)    Oral Nutrition/Hydration Goal 1, SLP  -- Pt will toelrate LRD w/o any overt s/s of aspiration.  -CS     Time Frame (Oral Nutrition/Hydration Goal 1, SLP)  -- by discharge  -CS     Barriers (Oral Nutrition/Hydration Goal 1, SLP)  -- n/a  -CS     Progress/Outcomes (Oral Nutrition/Hydration Goal 1, SLP) goal met  -CS goal ongoing  -CS       User Key  (r) = Recorded By, (t) = Taken By, (c) = Cosigned By    Initials Name Provider Type    CS Haider Harper MS CCC-SLP Speech and Language Pathologist          EDUCATION  The patient has been educated in the following areas:   Dysphagia (Swallowing Impairment).    SLP Recommendation and Plan                    Anticipated Dischage Disposition: home                Daily Summary of Progress (SLP): progress toward functional goals as expected  Plan for Continued Treatment (SLP): D/C  Plan of Care Reviewed With: patient  Progress: no change  Plan of Care Reviewed With: patient       SLP Outcome Measures (last 72 hours)      SLP Outcome Measures     Row Name 07/10/18 1520             SLP Outcome Measures    Outcome  Measure Used? Adult NOMS  -CS         FCM Scores    FCM Chosen Swallowing  -CS      Swallowing FCM Score 6  -CS        User Key  (r) = Recorded By, (t) = Taken By, (c) = Cosigned By    Initials Name Effective Dates     Haider CORTES Leroy MS CCC-SLP 04/03/18 -              Time Calculation:         Time Calculation- SLP     Row Name 07/11/18 1247             Time Calculation- SLP    SLP Start Time 1040  -CS      SLP Stop Time 1056  -CS      SLP Time Calculation (min) 16 min  -CS      SLP Received On 07/11/18  -        User Key  (r) = Recorded By, (t) = Taken By, (c) = Cosigned By    Initials Name Provider Type     Haider SOPHIA Harper MS CCC-SLP Speech and Language Pathologist          Therapy Charges for Today     Code Description Service Date Service Provider Modifiers Qty    69506925877 HC ST SWALLOWING CURRENT STATUS 7/10/2018 Haider Harper MS CCC-SLP GN, CI 1    63504087455 HC ST SWALLOWING PROJECTED 7/10/2018 Haider Harper MS CCC-SLP GN, CH 1    93459410497 HC ST EVAL ORAL PHARYNG SWALLOW 4 7/10/2018 Haider Harper MS CCC-SLP GN 1    19615303590 HC ST SWALLOWING PROJECTED 7/11/2018 Haider Harper MS CCC-SLP GN, CH 1    01946675604 HC ST SWALLOWING DISCHARGE 7/11/2018 Haider Harper MS CCC-SLP GN, CH 1    71684150529 HC ST TREATMENT SWALLOW 1 7/11/2018 Haider Harper MS CCC-SLP GN 1          SLP G-Codes  SLP NOMS Used?: Yes  Functional Limitations: Swallowing  Swallow Current Status (): At least 1 percent but less than 20 percent impaired, limited or restricted  Swallow Goal Status (): 0 percent impaired, limited or restricted  Swallow Discharge Status (): 0 percent impaired, limited or restricted    SLP Discharge Summary  Anticipated Dischage Disposition: home  Reason for Discharge: all goals and outcomes met, no further needs identified  Discharge Destination: other (see comments) (Pt remains in acute care)    Haider Harper MS CCC-SLP  7/11/2018

## 2018-07-11 NOTE — PLAN OF CARE
Problem: Skin Injury Risk (Adult)  Goal: Identify Related Risk Factors and Signs and Symptoms  Outcome: Ongoing (interventions implemented as appropriate)    Goal: Skin Health and Integrity  Outcome: Ongoing (interventions implemented as appropriate)      Problem: Patient Care Overview  Goal: Plan of Care Review  Outcome: Ongoing (interventions implemented as appropriate)   07/11/18 0439   Coping/Psychosocial   Plan of Care Reviewed With patient;significant other   Plan of Care Review   Progress improving   OTHER   Outcome Summary pt has left side facial droop. NIH score has been a 3. no neuro changes. no complaints of pain besides numbness on left side of face. left eye has been watery.

## 2018-07-11 NOTE — PLAN OF CARE
Problem: Patient Care Overview  Goal: Plan of Care Review  Outcome: Ongoing (interventions implemented as appropriate)   07/11/18 1450   Coping/Psychosocial   Plan of Care Reviewed With patient   Plan of Care Review   Progress no change   OTHER   Outcome Summary Initial eval. Pt appetite is good, family bringing in food. Education offered and refused. Will follow up.

## 2018-07-11 NOTE — PROGRESS NOTES
Discharge Planning Assessment  [facility]     Patient Name: Irwin Esparza  MRN: [id number]  Today's Date: 7/11/2018    Admit Date: 7/10/2018          Discharge Needs Assessment     Row Name 07/11/18 1501       Living Environment    Lives With significant other    Name(s) of Who Lives With Patient Mable Harper - significant other    Current Living Arrangements home/apartment/condo    Primary Care Provided by self    Provides Primary Care For no one    Family Caregiver if Needed significant other    Quality of Family Relationships supportive    Able to Return to Prior Arrangements yes       Resource/Environmental Concerns    Resource/Environmental Concerns none    Transportation Concerns car, none       Transition Planning    Patient/Family Anticipates Transition to home with family    Patient/Family Anticipated Services at Transition none    Transportation Anticipated family or friend will provide       Discharge Needs Assessment    Readmission Within the Last 30 Days no previous admission in last 30 days    Concerns to be Addressed no discharge needs identified;denies needs/concerns at this time    Equipment Currently Used at Home bipap/cpap    Anticipated Changes Related to Illness none    Equipment Needed After Discharge none    Discharge Coordination/Progress Patient admitted from home where he resides with his significant other.  Patient is independent and works.  Plan is for patient to discharge today.  Patient has a PCP and RX coverage.  [facility].  Patient's discharge summary will need to be faxed to the [facility] clinic when available.            Discharge Plan    No documentation.       Destination     No service coordination in this encounter.      Durable Medical Equipment     No service coordination in this encounter.      Dialysis/Infusion     No service coordination in this encounter.      Home Medical Care     No service coordination in  this encounter.      Social Care     No service coordination in this encounter.                Demographic Summary    No documentation.           Functional Status    No documentation.           Psychosocial    No documentation.           Abuse/Neglect    No documentation.           Legal    No documentation.           Substance Abuse    No documentation.           Patient Forms    No documentation.         NEERAJ Bass

## 2018-07-11 NOTE — DISCHARGE SUMMARY
Date of Admission: 07/10/2018  Date of Discharge:  7/11/2018    Admitting Diagnosis: Left facial droop  Discharge Diagnosis: Bell's palsy    Procedures Performed  CT Head  MR Brain  tPA administration       Pertinent Test Results:     Lab Results (last 24 hours)     Procedure Component Value Units Date/Time    POC Glucose Once [[id number]]  (Normal) Collected:  07/11/18 1056    Specimen:  Blood Updated:  07/11/18 1126     Glucose 95 mg/dL      Comment: : [id number] Reji AmberMeter ID: [id number]       POC Glucose Once [[id number]]  (Normal) Collected:  07/11/18 0559    Specimen:  Blood Updated:  07/11/18 0620     Glucose 110 mg/dL      Comment: : [id number] Kayce DE LA CRUZeter ID: [id number]       POC Glucose Once [[id number]]  (Abnormal) Collected:  07/10/18 2335    Specimen:  Blood Updated:  07/10/18 2346     Glucose 141 (H) mg/dL      Comment: : [id number] Harrison HaleyMeter ID: [id number]       Protime-INR [[id number]]  (Abnormal) Collected:  07/10/18 1800    Specimen:  Blood Updated:  07/10/18 1818     Protime 12.2 Seconds      INR 0.88 (L)    POC Glucose Once [[id number]]  (Normal) Collected:  07/10/18 1806    Specimen:  Blood Updated:  07/10/18 1817     Glucose 97 mg/dL      Comment: : [id number] Juan WhitneyMeter ID: [id number]       Comprehensive Metabolic Panel [[id number]]  (Abnormal) Collected:  07/10/18 1800    Specimen:  Blood Updated:  07/10/18 1817     Glucose 102 (H) mg/dL      BUN 8 mg/dL      Creatinine 0.84 mg/dL      Sodium 140 mmol/L      Potassium 4.0 mmol/L      Chloride 103 mmol/L      CO2 25.0 mmol/L      Calcium 8.8 mg/dL      Total Protein 6.6 g/dL      Albumin 4.00 g/dL      ALT (SGPT) 33 U/L      AST (SGOT) 26 U/L      Alkaline Phosphatase 71 U/L      Total Bilirubin 0.2 mg/dL      eGFR Non African Amer 97 mL/min/1.73      Globulin 2.6 gm/dL      A/G Ratio 1.5 g/dL      BUN/Creatinine Ratio 9.5     Anion Gap 12.0 mmol/L     CBC (No Diff) [[id number]]  (Normal) Collected:   07/10/18 1800    Specimen:  Blood Updated:  07/10/18 1807     WBC 10.80 10*3/mm3      RBC 4.92 10*6/mm3      Hemoglobin 14.6 g/dL      Hematocrit 42.5 %      MCV 86.4 fL      MCH 29.7 pg      MCHC 34.4 g/dL      RDW 13.4 %      RDW-SD 42.5 fl      MPV 10.0 fL      Platelets 381 10*3/mm3         Lab Results (last 48 hours)     Procedure Component Value Units Date/Time    POC Glucose Once [[id number]]  (Normal) Collected:  07/11/18 1056    Specimen:  Blood Updated:  07/11/18 1126     Glucose 95 mg/dL      Comment: : [id number] Reji AmberMeter ID: [id number]       POC Glucose Once [[id number]]  (Normal) Collected:  07/11/18 0559    Specimen:  Blood Updated:  07/11/18 0620     Glucose 110 mg/dL      Comment: : [id number] Kayce DE LA CRUZeter ID: [id number]       POC Glucose Once [[id number]]  (Abnormal) Collected:  07/10/18 2335    Specimen:  Blood Updated:  07/10/18 2346     Glucose 141 (H) mg/dL      Comment: : [id number] Harrison HaleyMeter ID: [id number]       Protime-INR [[id number]]  (Abnormal) Collected:  07/10/18 1800    Specimen:  Blood Updated:  07/10/18 1818     Protime 12.2 Seconds      INR 0.88 (L)    POC Glucose Once [[id number]]  (Normal) Collected:  07/10/18 1806    Specimen:  Blood Updated:  07/10/18 1817     Glucose 97 mg/dL      Comment: : [id number] Juan WhitneyMeter ID: [id number]       Comprehensive Metabolic Panel [[id number]]  (Abnormal) Collected:  07/10/18 1800    Specimen:  Blood Updated:  07/10/18 1817     Glucose 102 (H) mg/dL      BUN 8 mg/dL      Creatinine 0.84 mg/dL      Sodium 140 mmol/L      Potassium 4.0 mmol/L      Chloride 103 mmol/L      CO2 25.0 mmol/L      Calcium 8.8 mg/dL      Total Protein 6.6 g/dL      Albumin 4.00 g/dL      ALT (SGPT) 33 U/L      AST (SGOT) 26 U/L      Alkaline Phosphatase 71 U/L      Total Bilirubin 0.2 mg/dL      eGFR Non African Amer 97 mL/min/1.73      Globulin 2.6 gm/dL      A/G Ratio 1.5 g/dL      BUN/Creatinine Ratio 9.5     Anion Gap  12.0 mmol/L     CBC (No Diff) [[id number]]  (Normal) Collected:  07/10/18 1800    Specimen:  Blood Updated:  07/10/18 1807     WBC 10.80 10*3/mm3      RBC 4.92 10*6/mm3      Hemoglobin 14.6 g/dL      Hematocrit 42.5 %      MCV 86.4 fL      MCH 29.7 pg      MCHC 34.4 g/dL      RDW 13.4 %      RDW-SD 42.5 fl      MPV 10.0 fL      Platelets 381 10*3/mm3     CBC & Differential [[id number]] Collected:  07/10/18 1334    Specimen:  Blood Updated:  07/10/18 1421    Narrative:       The following orders were created for panel order CBC & Differential.  Procedure                               Abnormality         Status                     ---------                               -----------         ------                     Manual Differential[[id number]]          Abnormal            Final result               CBC Auto Differential[[id number]]        Normal              Final result                 Please view results for these tests on the individual orders.    CBC Auto Differential [[id number]]  (Normal) Collected:  07/10/18 1334    Specimen:  Blood Updated:  07/10/18 1421     WBC 10.66 10*3/mm3      RBC 5.44 10*6/mm3      Hemoglobin 16.0 g/dL      Hematocrit 46.4 %      MCV 85.3 fL      MCH 29.4 pg      MCHC 34.5 g/dL      RDW 13.3 %      RDW-SD 41.3 fl      MPV 10.1 fL      Platelets 387 10*3/mm3     Narrative:       The previously reported component NRBC is no longer being reported.    Manual Differential [[id number]]  (Abnormal) Collected:  07/10/18 1334    Specimen:  Blood Updated:  07/10/18 1421     Neutrophil % 51.0 %      Lymphocyte % 34.7 %      Monocyte % 5.1 %      Eosinophil % 1.0 %      Bands %  1.0 %      Metamyelocyte % 1.0 (H) %      Atypical Lymphocyte % 6.1 (H) %      Neutrophils Absolute 5.55 10*3/mm3      Lymphocytes Absolute 3.70 10*3/mm3      Monocytes Absolute 0.54 10*3/mm3      Eosinophils Absolute 0.11 10*3/mm3      RBC Morphology Normal     WBC Morphology Normal     Platelet Morphology Normal     Protime-INR [[id number]]  (Abnormal) Collected:  07/10/18 1334    Specimen:  Blood Updated:  07/10/18 1357     Protime 12.1 Seconds      INR 0.87 (L)    aPTT [[id number]]  (Normal) Collected:  07/10/18 1334    Specimen:  Blood Updated:  07/10/18 1357     PTT 28.1 seconds     Comprehensive Metabolic Panel [[id number]]  (Abnormal) Collected:  07/10/18 1334    Specimen:  Blood Updated:  07/10/18 1355     Glucose 97 mg/dL      BUN 7 mg/dL      Creatinine 0.92 mg/dL      Sodium 142 mmol/L      Potassium 4.0 mmol/L      Chloride 103 mmol/L      CO2 25.0 mmol/L      Calcium 9.3 mg/dL      Total Protein 7.5 g/dL      Albumin 4.60 g/dL      ALT (SGPT) 39 U/L      AST (SGOT) 26 U/L      Alkaline Phosphatase 75 U/L      Total Bilirubin 0.4 mg/dL      eGFR Non African Amer 87 mL/min/1.73      Globulin 2.9 gm/dL      A/G Ratio 1.6 g/dL      BUN/Creatinine Ratio 7.6     Anion Gap 14.0 (H) mmol/L     POC Glucose Once [[id number]]  (Normal) Collected:  07/10/18 1330    Specimen:  Blood Updated:  07/10/18 1341     Glucose 97 mg/dL      Comment: : [id number] Helen CoxMeter ID: [id number]                 Hospital Course  Patient is a 47 y.o. male presented with left facial droop that was acute in onset.  He also initially complained of left sided numbness.  He had stroke risk factors including hypertension, hyperlipidemia, and tobacco abuse.  A CODE STROKE was initially activated.  A Head CT showed no signs of bleeding.  The risks and benefits of tPA were discussed with the patient.  I did explain that his symptoms may be a Bell's palsy but that I could not completely rule out a stroke and tPA could only be delivered immediately and would not be available later in the admission if he chose to forego it in the ER.  He and his wife decided to pursue tPA.  It was administered in it's entirety.  He was admitted to the intensive care unit with 24 hour tPA precautions.  A MR Brain was performed after admission and showed no signs of a  stroke.  He also endorsed improvement of his left sided numbness.  His exam was somewhat atypical in that his forehead was partially involved but not fully paralyzed making his clinical picture somewhat confusing initially.  A repeat Head CT @ 24 hours showed no signs of bleeding.  He will be discharged today from the ICU.  He is currently doing well.  He continued to have a peripheral nerve VII palsy on the left.  I will prescribe him a prednisone taper and eye drops.  His exam otherwise is non-focal.  He can follow up with my office in 6 weeks and his primary care physician in a week.  I did  him about tobacco cessation.       Discharge Disposition  Home or Self Care    Discharge Medications     Discharge Medications      New Medications      Instructions Start Date   predniSONE 10 MG tablet  Commonly known as:  DELTASONE   10 mg, Oral, Daily, 6 tabs on day 1 decreasing by 1 tab per day over 7 days then stop -60/50/40/30/20/10/5/off         Continue These Medications      Instructions Start Date   acetaminophen 325 MG tablet  Commonly known as:  TYLENOL   650 mg, Oral, 2 Times Daily      aspirin 325 MG tablet   325 mg, Oral, 2 Times Daily      atorvastatin 20 MG tablet  Commonly known as:  LIPITOR   10 mg, Oral, Daily      carboxymethylcellulose 0.5 % solution  Commonly known as:  REFRESH PLUS   1 drop, Both Eyes, 4 Times Daily PRN      Cholecalciferol 2000 units tablet   1 tablet, Oral, Every 12 Hours      diclofenac 75 MG EC tablet  Commonly known as:  VOLTAREN   75 mg, Oral, 2 Times Daily      hydrochlorothiazide 25 MG tablet  Commonly known as:  HYDRODIURIL   25 mg, Oral, Daily      lisinopril 40 MG tablet  Commonly known as:  PRINIVIL,ZESTRIL   40 mg, Oral, Daily      methylphenidate 5 MG tablet  Commonly known as:  RITALIN   15 mg, Oral, Every 12 Hours Scheduled      omeprazole 20 MG capsule  Commonly known as:  priLOSEC   20 mg, Oral, Daily             Discharge Diet:   Regular  Activity at  Discharge:   Return to work in 2 days  Follow-up Appointments  No future appointments.  Follow up with neurology in 6 weeks.    Test Results Pending at Discharge  none     Srikanth Beebe MD  07/11/18  4:26 PM    Time: Discharge 40 min

## 2018-07-11 NOTE — PLAN OF CARE
Problem: Patient Care Overview  Goal: Plan of Care Review  Outcome: Ongoing (interventions implemented as appropriate)   07/11/18 1240   Coping/Psychosocial   Plan of Care Reviewed With patient   Plan of Care Review   Progress no change   OTHER   Outcome Summary SLP treatment complete. Pt reports no difficulties with PO intake except for occasional anterior loss of bolus from left side. He completed trials of water w/o any overt s/s of aspiration. SLP explained the benefit for neuromuscular electrical stimulation to be completed as an outpatient in order to improve facial droop. Pt verbalized understanding. SLP is signing off at this time. MD to re-consult if an issue arises which requires SLP services.

## 2018-07-12 NOTE — PAYOR COMM NOTE
"DC HOME 7-11-18      Caden Prater  (47 y.o. Male)     Date of Birth Social Security Number Address Home Phone MRN    1970  [id number] [facility] [address] [phone] [id number]    [employer] Marital Status          [facility] Single       Admission Date Admission Type Admitting Provider Attending Provider Department, Room/Bed    7/10/18 Emergency Srikanth Beebe MD  [facility] CARDIAC CARE, C006/1    Discharge Date Discharge Disposition Discharge Destination        7/11/2018 Home or Self Care              Attending Provider:  (none)   Allergies:  No Known Allergies    Isolation:  None   Infection:  None   Code Status:  Prior    Ht:  165.1 cm (65\")   Wt:  85.5 kg (188 lb 8 oz)    Admission Cmt:  None   Principal Problem:  None                Active Insurance as of 7/10/2018     Primary Coverage     Payor Plan Insurance Group Employer/Plan Group    [employer] [id number]      Payor Plan Address Payor Plan Phone Number Effective From Effective To    [employer]  [phone] 7/10/2018     [facility] [facility] [id number]       Subscriber Name Subscriber Birth Date Member ID       CADEN PRATER 1970 [id number]                 Emergency Contacts      (Rel.) Home Phone Work Phone Mobile Phone    Mable Harper (Significant Other) [phone] -- --               Discharge Summary      Srikanth Beebe MD at 7/11/2018  4:26 PM          Date of Admission: 07/10/2018  Date of Discharge:  7/11/2018    Admitting Diagnosis: Left facial droop  Discharge Diagnosis: Bell's palsy    Procedures Performed  CT Head  MR Brain  tPA administration       Pertinent Test Results:     Lab Results (last 24 hours)     Procedure Component Value Units Date/Time    POC Glucose Once [[id number]]  (Normal) Collected:  07/11/18 1056    Specimen:  Blood Updated:  07/11/18 1126     Glucose 95 mg/dL      Comment: : [id number] Mendoza AmberMeter ID: [id number]       POC " Glucose Once [[id number]]  (Normal) Collected:  07/11/18 0559    Specimen:  Blood Updated:  07/11/18 0620     Glucose 110 mg/dL      Comment: : [id number] Kayce Heck ID: [id number]       POC Glucose Once [[id number]]  (Abnormal) Collected:  07/10/18 2335    Specimen:  Blood Updated:  07/10/18 2346     Glucose 141 (H) mg/dL      Comment: : [id number] Harrison HaleyMeter ID: [id number]       Protime-INR [[id number]]  (Abnormal) Collected:  07/10/18 1800    Specimen:  Blood Updated:  07/10/18 1818     Protime 12.2 Seconds      INR 0.88 (L)    POC Glucose Once [[id number]]  (Normal) Collected:  07/10/18 1806    Specimen:  Blood Updated:  07/10/18 1817     Glucose 97 mg/dL      Comment: : [id number] Juan WhitneyMeter ID: [id number]       Comprehensive Metabolic Panel [[id number]]  (Abnormal) Collected:  07/10/18 1800    Specimen:  Blood Updated:  07/10/18 1817     Glucose 102 (H) mg/dL      BUN 8 mg/dL      Creatinine 0.84 mg/dL      Sodium 140 mmol/L      Potassium 4.0 mmol/L      Chloride 103 mmol/L      CO2 25.0 mmol/L      Calcium 8.8 mg/dL      Total Protein 6.6 g/dL      Albumin 4.00 g/dL      ALT (SGPT) 33 U/L      AST (SGOT) 26 U/L      Alkaline Phosphatase 71 U/L      Total Bilirubin 0.2 mg/dL      eGFR Non African Amer 97 mL/min/1.73      Globulin 2.6 gm/dL      A/G Ratio 1.5 g/dL      BUN/Creatinine Ratio 9.5     Anion Gap 12.0 mmol/L     CBC (No Diff) [[id number]]  (Normal) Collected:  07/10/18 1800    Specimen:  Blood Updated:  07/10/18 1807     WBC 10.80 10*3/mm3      RBC 4.92 10*6/mm3      Hemoglobin 14.6 g/dL      Hematocrit 42.5 %      MCV 86.4 fL      MCH 29.7 pg      MCHC 34.4 g/dL      RDW 13.4 %      RDW-SD 42.5 fl      MPV 10.0 fL      Platelets 381 10*3/mm3         Lab Results (last 48 hours)     Procedure Component Value Units Date/Time    POC Glucose Once [[id number]]  (Normal) Collected:  07/11/18 1056    Specimen:  Blood Updated:  07/11/18 1126     Glucose 95 mg/dL      Comment:  : [id number] Mendoza AmberMeter ID: [id number]       POC Glucose Once [[id number]]  (Normal) Collected:  07/11/18 0559    Specimen:  Blood Updated:  07/11/18 0620     Glucose 110 mg/dL      Comment: : [id number] Kayce Heck ID: [id number]       POC Glucose Once [[id number]]  (Abnormal) Collected:  07/10/18 2335    Specimen:  Blood Updated:  07/10/18 2346     Glucose 141 (H) mg/dL      Comment: : [id number] Harrison KellyeyMeter ID: [id number]       Protime-INR [[id number]]  (Abnormal) Collected:  07/10/18 1800    Specimen:  Blood Updated:  07/10/18 1818     Protime 12.2 Seconds      INR 0.88 (L)    POC Glucose Once [[id number]]  (Normal) Collected:  07/10/18 1806    Specimen:  Blood Updated:  07/10/18 1817     Glucose 97 mg/dL      Comment: : [id number] Juan WhitneyMeter ID: [id number]       Comprehensive Metabolic Panel [[id number]]  (Abnormal) Collected:  07/10/18 1800    Specimen:  Blood Updated:  07/10/18 1817     Glucose 102 (H) mg/dL      BUN 8 mg/dL      Creatinine 0.84 mg/dL      Sodium 140 mmol/L      Potassium 4.0 mmol/L      Chloride 103 mmol/L      CO2 25.0 mmol/L      Calcium 8.8 mg/dL      Total Protein 6.6 g/dL      Albumin 4.00 g/dL      ALT (SGPT) 33 U/L      AST (SGOT) 26 U/L      Alkaline Phosphatase 71 U/L      Total Bilirubin 0.2 mg/dL      eGFR Non African Amer 97 mL/min/1.73      Globulin 2.6 gm/dL      A/G Ratio 1.5 g/dL      BUN/Creatinine Ratio 9.5     Anion Gap 12.0 mmol/L     CBC (No Diff) [[id number]]  (Normal) Collected:  07/10/18 1800    Specimen:  Blood Updated:  07/10/18 1807     WBC 10.80 10*3/mm3      RBC 4.92 10*6/mm3      Hemoglobin 14.6 g/dL      Hematocrit 42.5 %      MCV 86.4 fL      MCH 29.7 pg      MCHC 34.4 g/dL      RDW 13.4 %      RDW-SD 42.5 fl      MPV 10.0 fL      Platelets 381 10*3/mm3     CBC & Differential [[id number]] Collected:  07/10/18 1334    Specimen:  Blood Updated:  07/10/18 1421    Narrative:       The following orders were created for panel  order CBC & Differential.  Procedure                               Abnormality         Status                     ---------                               -----------         ------                     Manual Differential[[id number]]          Abnormal            Final result               CBC Auto Differential[[id number]]        Normal              Final result                 Please view results for these tests on the individual orders.    CBC Auto Differential [[id number]]  (Normal) Collected:  07/10/18 1334    Specimen:  Blood Updated:  07/10/18 1421     WBC 10.66 10*3/mm3      RBC 5.44 10*6/mm3      Hemoglobin 16.0 g/dL      Hematocrit 46.4 %      MCV 85.3 fL      MCH 29.4 pg      MCHC 34.5 g/dL      RDW 13.3 %      RDW-SD 41.3 fl      MPV 10.1 fL      Platelets 387 10*3/mm3     Narrative:       The previously reported component NRBC is no longer being reported.    Manual Differential [[id number]]  (Abnormal) Collected:  07/10/18 1334    Specimen:  Blood Updated:  07/10/18 1421     Neutrophil % 51.0 %      Lymphocyte % 34.7 %      Monocyte % 5.1 %      Eosinophil % 1.0 %      Bands %  1.0 %      Metamyelocyte % 1.0 (H) %      Atypical Lymphocyte % 6.1 (H) %      Neutrophils Absolute 5.55 10*3/mm3      Lymphocytes Absolute 3.70 10*3/mm3      Monocytes Absolute 0.54 10*3/mm3      Eosinophils Absolute 0.11 10*3/mm3      RBC Morphology Normal     WBC Morphology Normal     Platelet Morphology Normal    Protime-INR [[id number]]  (Abnormal) Collected:  07/10/18 1334    Specimen:  Blood Updated:  07/10/18 1357     Protime 12.1 Seconds      INR 0.87 (L)    aPTT [[id number]]  (Normal) Collected:  07/10/18 1334    Specimen:  Blood Updated:  07/10/18 1357     PTT 28.1 seconds     Comprehensive Metabolic Panel [[id number]]  (Abnormal) Collected:  07/10/18 1334    Specimen:  Blood Updated:  07/10/18 1355     Glucose 97 mg/dL      BUN 7 mg/dL      Creatinine 0.92 mg/dL      Sodium 142 mmol/L      Potassium 4.0 mmol/L      Chloride 103  mmol/L      CO2 25.0 mmol/L      Calcium 9.3 mg/dL      Total Protein 7.5 g/dL      Albumin 4.60 g/dL      ALT (SGPT) 39 U/L      AST (SGOT) 26 U/L      Alkaline Phosphatase 75 U/L      Total Bilirubin 0.4 mg/dL      eGFR Non African Amer 87 mL/min/1.73      Globulin 2.9 gm/dL      A/G Ratio 1.6 g/dL      BUN/Creatinine Ratio 7.6     Anion Gap 14.0 (H) mmol/L     POC Glucose Once [[id number]]  (Normal) Collected:  07/10/18 1330    Specimen:  Blood Updated:  07/10/18 1341     Glucose 97 mg/dL      Comment: : [id number] Helen CoxMeter ID: [id number]                 Hospital Course  Patient is a 47 y.o. male presented with left facial droop that was acute in onset.  He also initially complained of left sided numbness.  He had stroke risk factors including hypertension, hyperlipidemia, and tobacco abuse.  A CODE STROKE was initially activated.  A Head CT showed no signs of bleeding.  The risks and benefits of tPA were discussed with the patient.  I did explain that his symptoms may be a Bell's palsy but that I could not completely rule out a stroke and tPA could only be delivered immediately and would not be available later in the admission if he chose to forego it in the ER.  He and his wife decided to pursue tPA.  It was administered in it's entirety.  He was admitted to the intensive care unit with 24 hour tPA precautions.  A MR Brain was performed after admission and showed no signs of a stroke.  He also endorsed improvement of his left sided numbness.  His exam was somewhat atypical in that his forehead was partially involved but not fully paralyzed making his clinical picture somewhat confusing initially.  A repeat Head CT @ 24 hours showed no signs of bleeding.  He will be discharged today from the ICU.  He is currently doing well.  He continued to have a peripheral nerve VII palsy on the left.  I will prescribe him a prednisone taper and eye drops.  His exam otherwise is non-focal.  He can follow up  with my office in 6 weeks and his primary care physician in a week.  I did  him about tobacco cessation.       Discharge Disposition  Home or Self Care    Discharge Medications     Discharge Medications      New Medications      Instructions Start Date   predniSONE 10 MG tablet  Commonly known as:  DELTASONE   10 mg, Oral, Daily, 6 tabs on day 1 decreasing by 1 tab per day over 7 days then stop -60/50/40/30/20/10/5/off         Continue These Medications      Instructions Start Date   acetaminophen 325 MG tablet  Commonly known as:  TYLENOL   650 mg, Oral, 2 Times Daily      aspirin 325 MG tablet   325 mg, Oral, 2 Times Daily      atorvastatin 20 MG tablet  Commonly known as:  LIPITOR   10 mg, Oral, Daily      carboxymethylcellulose 0.5 % solution  Commonly known as:  REFRESH PLUS   1 drop, Both Eyes, 4 Times Daily PRN      Cholecalciferol 2000 units tablet   1 tablet, Oral, Every 12 Hours      diclofenac 75 MG EC tablet  Commonly known as:  VOLTAREN   75 mg, Oral, 2 Times Daily      hydrochlorothiazide 25 MG tablet  Commonly known as:  HYDRODIURIL   25 mg, Oral, Daily      lisinopril 40 MG tablet  Commonly known as:  PRINIVIL,ZESTRIL   40 mg, Oral, Daily      methylphenidate 5 MG tablet  Commonly known as:  RITALIN   15 mg, Oral, Every 12 Hours Scheduled      omeprazole 20 MG capsule  Commonly known as:  priLOSEC   20 mg, Oral, Daily             Discharge Diet:   Regular  Activity at Discharge:   Return to work in 2 days  Follow-up Appointments  No future appointments.  Follow up with neurology in 6 weeks.    Test Results Pending at Discharge  none     Srikanth Beebe MD  07/11/18  4:26 PM    Time: Discharge 40 min            Electronically signed by Srikanth Beebe MD at 7/11/2018  4:33 PM

## 2019-03-12 ENCOUNTER — TELEPHONE (OUTPATIENT)
Dept: NEUROLOGY | Facility: CLINIC | Age: 49
End: 2019-03-12

## 2019-03-12 ENCOUNTER — DOCUMENTATION (OUTPATIENT)
Dept: NEUROLOGY | Facility: CLINIC | Age: 49
End: 2019-03-12

## 2019-03-12 NOTE — TELEPHONE ENCOUNTER
Called [facility] to inquire if the patient had a [facility] auth for 3/13/19 OV and they told me the patient had called them and denied care through the [facility] in the future. I tried to call patient to ask if he wanted to keep his 3/13/19 appt but had to leave a message.

## 2019-03-12 NOTE — PROGRESS NOTES
Office staff member, JANE Forman had called the [facility] 3/11/19  to see if patient had an authorization for his visit  on 3/13/19 and was told  the patient had called them and denied further care. Ms. Forman tried to call patient to discuss but had to leave a message. He never called back and didn't show for his appt today 3/12/19.

## 2019-06-13 ENCOUNTER — HOSPITAL ENCOUNTER (OUTPATIENT)
Facility: HOSPITAL | Age: 49
Discharge: HOME | End: 2019-06-17
Attending: FAMILY MEDICINE | Admitting: INTERNAL MEDICINE

## 2019-06-13 ENCOUNTER — APPOINTMENT (OUTPATIENT)
Dept: GENERAL RADIOLOGY | Facility: HOSPITAL | Age: 49
End: 2019-06-13

## 2019-06-13 DIAGNOSIS — R94.39 ABNORMAL STRESS TEST: ICD-10-CM

## 2019-06-13 DIAGNOSIS — R07.9 CHEST PAIN, UNSPECIFIED TYPE: Primary | ICD-10-CM

## 2019-06-13 PROBLEM — E66.9 OBESITY (BMI 30-39.9): Status: ACTIVE | Noted: 2019-06-13

## 2019-06-13 PROBLEM — R07.89 CHEST PAIN, ATYPICAL: Status: ACTIVE | Noted: 2019-06-13

## 2019-06-13 PROBLEM — F17.200 SMOKER: Status: ACTIVE | Noted: 2019-06-13

## 2019-06-13 PROBLEM — I10 HTN (HYPERTENSION): Status: ACTIVE | Noted: 2019-06-13

## 2019-06-13 PROBLEM — E78.5 HYPERLIPIDEMIA: Status: ACTIVE | Noted: 2019-06-13

## 2019-06-13 PROBLEM — K21.00 REFLUX ESOPHAGITIS: Status: ACTIVE | Noted: 2019-06-13

## 2019-06-13 LAB
ALBUMIN SERPL-MCNC: 4.2 G/DL (ref 3.5–5)
ALBUMIN/GLOB SERPL: 1.5 G/DL (ref 1.1–2.5)
ALP SERPL-CCNC: 87 U/L (ref 24–120)
ALT SERPL W P-5'-P-CCNC: 28 U/L (ref 0–54)
ANION GAP SERPL CALCULATED.3IONS-SCNC: 7 MMOL/L (ref 4–13)
APTT PPP: 28.7 SECONDS (ref 24.1–35)
AST SERPL-CCNC: 23 U/L (ref 7–45)
BASOPHILS # BLD MANUAL: 0.12 10*3/MM3 (ref 0–0.2)
BASOPHILS NFR BLD AUTO: 1 % (ref 0–2)
BILIRUB SERPL-MCNC: 0.3 MG/DL (ref 0.1–1)
BUN BLD-MCNC: 11 MG/DL (ref 5–21)
BUN/CREAT SERPL: 11.5 (ref 7–25)
CALCIUM SPEC-SCNC: 9.3 MG/DL (ref 8.4–10.4)
CHLORIDE SERPL-SCNC: 104 MMOL/L (ref 98–110)
CO2 SERPL-SCNC: 28 MMOL/L (ref 24–31)
CREAT BLD-MCNC: 0.96 MG/DL (ref 0.5–1.4)
D DIMER PPP FEU-MCNC: 0.31 MG/L (FEU) (ref 0–0.5)
DEPRECATED RDW RBC AUTO: 40 FL (ref 40–54)
EOSINOPHIL # BLD MANUAL: 0.36 10*3/MM3 (ref 0–0.7)
EOSINOPHIL NFR BLD MANUAL: 3.1 % (ref 0–4)
ERYTHROCYTE [DISTWIDTH] IN BLOOD BY AUTOMATED COUNT: 12.5 % (ref 12–15)
GFR SERPL CREATININE-BSD FRML MDRD: 83 ML/MIN/1.73
GIANT PLATELETS: ABNORMAL
GLOBULIN UR ELPH-MCNC: 2.8 GM/DL
GLUCOSE BLD-MCNC: 101 MG/DL (ref 70–100)
HCT VFR BLD AUTO: 44.9 % (ref 40–52)
HGB BLD-MCNC: 16 G/DL (ref 14–18)
HOLD SPECIMEN: NORMAL
HOLD SPECIMEN: NORMAL
INR PPP: 0.86 (ref 0.91–1.09)
LIPASE SERPL-CCNC: 84 U/L (ref 23–203)
LYMPHOCYTES # BLD MANUAL: 4.33 10*3/MM3 (ref 0.72–4.86)
LYMPHOCYTES NFR BLD MANUAL: 3.1 % (ref 4–12)
LYMPHOCYTES NFR BLD MANUAL: 37.5 % (ref 15–45)
MCH RBC QN AUTO: 31.1 PG (ref 28–32)
MCHC RBC AUTO-ENTMCNC: 35.6 G/DL (ref 33–36)
MCV RBC AUTO: 87.4 FL (ref 82–95)
MONOCYTES # BLD AUTO: 0.36 10*3/MM3 (ref 0.19–1.3)
NEUTROPHILS # BLD AUTO: 6.37 10*3/MM3 (ref 1.87–8.4)
NEUTROPHILS NFR BLD MANUAL: 55.2 % (ref 39–78)
PLATELET # BLD AUTO: 356 10*3/MM3 (ref 130–400)
PMV BLD AUTO: 10.3 FL (ref 6–12)
POTASSIUM BLD-SCNC: 4 MMOL/L (ref 3.5–5.3)
PROT SERPL-MCNC: 7 G/DL (ref 6.3–8.7)
PROTHROMBIN TIME: 12 SECONDS (ref 11.9–14.6)
RBC # BLD AUTO: 5.14 10*6/MM3 (ref 4.8–5.9)
RBC MORPH BLD: NORMAL
SODIUM BLD-SCNC: 139 MMOL/L (ref 135–145)
TROPONIN I SERPL-MCNC: <0.012 NG/ML (ref 0–0.03)
WBC MORPH BLD: NORMAL
WBC NRBC COR # BLD: 11.54 10*3/MM3 (ref 4.8–10.8)
WHOLE BLOOD HOLD SPECIMEN: NORMAL
WHOLE BLOOD HOLD SPECIMEN: NORMAL

## 2019-06-13 PROCEDURE — 84484 ASSAY OF TROPONIN QUANT: CPT

## 2019-06-13 PROCEDURE — 80053 COMPREHEN METABOLIC PANEL: CPT

## 2019-06-13 PROCEDURE — 85007 BL SMEAR W/DIFF WBC COUNT: CPT

## 2019-06-13 PROCEDURE — 93010 ELECTROCARDIOGRAM REPORT: CPT | Performed by: INTERNAL MEDICINE

## 2019-06-13 PROCEDURE — 71045 X-RAY EXAM CHEST 1 VIEW: CPT

## 2019-06-13 PROCEDURE — 85379 FIBRIN DEGRADATION QUANT: CPT | Performed by: NURSE PRACTITIONER

## 2019-06-13 PROCEDURE — 96361 HYDRATE IV INFUSION ADD-ON: CPT

## 2019-06-13 PROCEDURE — 85610 PROTHROMBIN TIME: CPT | Performed by: NURSE PRACTITIONER

## 2019-06-13 PROCEDURE — 96374 THER/PROPH/DIAG INJ IV PUSH: CPT

## 2019-06-13 PROCEDURE — 85025 COMPLETE CBC W/AUTO DIFF WBC: CPT

## 2019-06-13 PROCEDURE — G0378 HOSPITAL OBSERVATION PER HR: HCPCS

## 2019-06-13 PROCEDURE — 84484 ASSAY OF TROPONIN QUANT: CPT | Performed by: FAMILY MEDICINE

## 2019-06-13 PROCEDURE — 93005 ELECTROCARDIOGRAM TRACING: CPT

## 2019-06-13 PROCEDURE — 93005 ELECTROCARDIOGRAM TRACING: CPT | Performed by: FAMILY MEDICINE

## 2019-06-13 PROCEDURE — 94760 N-INVAS EAR/PLS OXIMETRY 1: CPT

## 2019-06-13 PROCEDURE — 94799 UNLISTED PULMONARY SVC/PX: CPT

## 2019-06-13 PROCEDURE — 99284 EMERGENCY DEPT VISIT MOD MDM: CPT

## 2019-06-13 PROCEDURE — 83690 ASSAY OF LIPASE: CPT | Performed by: NURSE PRACTITIONER

## 2019-06-13 PROCEDURE — 85730 THROMBOPLASTIN TIME PARTIAL: CPT | Performed by: NURSE PRACTITIONER

## 2019-06-13 RX ORDER — SODIUM CHLORIDE 0.9 % (FLUSH) 0.9 %
3 SYRINGE (ML) INJECTION EVERY 12 HOURS SCHEDULED
Status: DISCONTINUED | OUTPATIENT
Start: 2019-06-13 | End: 2019-06-17 | Stop reason: HOSPADM

## 2019-06-13 RX ORDER — SODIUM CHLORIDE 0.9 % (FLUSH) 0.9 %
3-10 SYRINGE (ML) INJECTION AS NEEDED
Status: DISCONTINUED | OUTPATIENT
Start: 2019-06-13 | End: 2019-06-17 | Stop reason: HOSPADM

## 2019-06-13 RX ORDER — SODIUM CHLORIDE 0.9 % (FLUSH) 0.9 %
10 SYRINGE (ML) INJECTION AS NEEDED
Status: DISCONTINUED | OUTPATIENT
Start: 2019-06-13 | End: 2019-06-13

## 2019-06-13 RX ORDER — ACETAMINOPHEN 325 MG/1
650 TABLET ORAL 2 TIMES DAILY
Status: DISCONTINUED | OUTPATIENT
Start: 2019-06-13 | End: 2019-06-17 | Stop reason: HOSPADM

## 2019-06-13 RX ORDER — ASPIRIN 81 MG/1
324 TABLET, CHEWABLE ORAL ONCE
Status: COMPLETED | OUTPATIENT
Start: 2019-06-13 | End: 2019-06-13

## 2019-06-13 RX ORDER — HYDROCHLOROTHIAZIDE 25 MG/1
25 TABLET ORAL DAILY
Status: DISCONTINUED | OUTPATIENT
Start: 2019-06-13 | End: 2019-06-17 | Stop reason: HOSPADM

## 2019-06-13 RX ORDER — LISINOPRIL 20 MG/1
40 TABLET ORAL DAILY
Status: DISCONTINUED | OUTPATIENT
Start: 2019-06-13 | End: 2019-06-17 | Stop reason: HOSPADM

## 2019-06-13 RX ORDER — FAMOTIDINE 10 MG/ML
20 INJECTION, SOLUTION INTRAVENOUS 2 TIMES DAILY
Status: DISCONTINUED | OUTPATIENT
Start: 2019-06-13 | End: 2019-06-15 | Stop reason: ALTCHOICE

## 2019-06-13 RX ORDER — SODIUM CHLORIDE 9 MG/ML
75 INJECTION, SOLUTION INTRAVENOUS CONTINUOUS
Status: DISCONTINUED | OUTPATIENT
Start: 2019-06-13 | End: 2019-06-14

## 2019-06-13 RX ORDER — ATORVASTATIN CALCIUM 10 MG/1
10 TABLET, FILM COATED ORAL NIGHTLY
Status: DISCONTINUED | OUTPATIENT
Start: 2019-06-13 | End: 2019-06-14

## 2019-06-13 RX ORDER — ONDANSETRON 2 MG/ML
4 INJECTION INTRAMUSCULAR; INTRAVENOUS EVERY 6 HOURS PRN
Status: DISCONTINUED | OUTPATIENT
Start: 2019-06-13 | End: 2019-06-17 | Stop reason: HOSPADM

## 2019-06-13 RX ORDER — NITROGLYCERIN 0.4 MG/1
0.4 TABLET SUBLINGUAL
Status: DISCONTINUED | OUTPATIENT
Start: 2019-06-13 | End: 2019-06-17 | Stop reason: HOSPADM

## 2019-06-13 RX ORDER — NICOTINE 21 MG/24HR
1 PATCH, TRANSDERMAL 24 HOURS TRANSDERMAL EVERY 24 HOURS
Status: DISCONTINUED | OUTPATIENT
Start: 2019-06-13 | End: 2019-06-17 | Stop reason: HOSPADM

## 2019-06-13 RX ORDER — ASPIRIN 81 MG/1
81 TABLET, CHEWABLE ORAL DAILY
Status: DISCONTINUED | OUTPATIENT
Start: 2019-06-14 | End: 2019-06-17 | Stop reason: HOSPADM

## 2019-06-13 RX ADMIN — SODIUM CHLORIDE 75 ML/HR: 9 INJECTION, SOLUTION INTRAVENOUS at 20:26

## 2019-06-13 RX ADMIN — SODIUM CHLORIDE, PRESERVATIVE FREE 3 ML: 5 INJECTION INTRAVENOUS at 20:22

## 2019-06-13 RX ADMIN — FAMOTIDINE 20 MG: 10 INJECTION INTRAVENOUS at 20:26

## 2019-06-13 RX ADMIN — NICOTINE 1 PATCH: 21 PATCH, EXTENDED RELEASE TRANSDERMAL at 20:31

## 2019-06-13 RX ADMIN — ASPIRIN 81 MG 324 MG: 81 TABLET ORAL at 17:45

## 2019-06-14 ENCOUNTER — APPOINTMENT (OUTPATIENT)
Dept: CARDIOLOGY | Facility: HOSPITAL | Age: 49
End: 2019-06-14

## 2019-06-14 PROBLEM — I20.0 UNSTABLE ANGINA (HCC): Status: ACTIVE | Noted: 2019-06-13

## 2019-06-14 PROBLEM — R94.39 ABNORMAL STRESS TEST: Status: ACTIVE | Noted: 2019-06-14

## 2019-06-14 PROBLEM — R07.89 CHEST PAIN, ATYPICAL: Status: RESOLVED | Noted: 2019-06-13 | Resolved: 2019-06-14

## 2019-06-14 PROBLEM — R07.9 CHEST PAIN: Status: ACTIVE | Noted: 2019-06-13

## 2019-06-14 LAB
ALBUMIN SERPL-MCNC: 3.7 G/DL (ref 3.5–5)
ALBUMIN/GLOB SERPL: 1.5 G/DL (ref 1.1–2.5)
ALP SERPL-CCNC: 76 U/L (ref 24–120)
ALT SERPL W P-5'-P-CCNC: 22 U/L (ref 0–54)
ANION GAP SERPL CALCULATED.3IONS-SCNC: 5 MMOL/L (ref 4–13)
ARTICHOKE IGE QN: 108 MG/DL (ref 0–99)
AST SERPL-CCNC: 21 U/L (ref 7–45)
BASOPHILS # BLD MANUAL: 0.29 10*3/MM3 (ref 0–0.2)
BASOPHILS NFR BLD AUTO: 3 % (ref 0–2)
BH CV STRESS BP STAGE 1: NORMAL
BH CV STRESS BP STAGE 2: NORMAL
BH CV STRESS BP STAGE 3: NORMAL
BH CV STRESS BP STAGE 4: NORMAL
BH CV STRESS DURATION MIN STAGE 1: 3
BH CV STRESS DURATION MIN STAGE 2: 3
BH CV STRESS DURATION MIN STAGE 3: 3
BH CV STRESS DURATION MIN STAGE 4: 0
BH CV STRESS DURATION SEC STAGE 1: 0
BH CV STRESS DURATION SEC STAGE 2: 0
BH CV STRESS DURATION SEC STAGE 3: 0
BH CV STRESS DURATION SEC STAGE 4: 27
BH CV STRESS GRADE STAGE 1: 10
BH CV STRESS GRADE STAGE 2: 12
BH CV STRESS GRADE STAGE 3: 14
BH CV STRESS GRADE STAGE 4: 16
BH CV STRESS HR STAGE 1: 126
BH CV STRESS HR STAGE 2: 130
BH CV STRESS HR STAGE 3: 137
BH CV STRESS HR STAGE 4: 144
BH CV STRESS METS STAGE 1: 5
BH CV STRESS METS STAGE 2: 7.5
BH CV STRESS METS STAGE 3: 10
BH CV STRESS METS STAGE 4: 13.5
BH CV STRESS PROTOCOL 1: NORMAL
BH CV STRESS RECOVERY BP: NORMAL MMHG
BH CV STRESS RECOVERY HR: 82 BPM
BH CV STRESS SPEED STAGE 1: 1.7
BH CV STRESS SPEED STAGE 2: 2.5
BH CV STRESS SPEED STAGE 3: 3.4
BH CV STRESS SPEED STAGE 4: 4.2
BH CV STRESS STAGE 1: 1
BH CV STRESS STAGE 2: 2
BH CV STRESS STAGE 3: 3
BH CV STRESS STAGE 4: 4
BILIRUB SERPL-MCNC: 0.2 MG/DL (ref 0.1–1)
BUN BLD-MCNC: 11 MG/DL (ref 5–21)
BUN/CREAT SERPL: 10.4 (ref 7–25)
CALCIUM SPEC-SCNC: 8.8 MG/DL (ref 8.4–10.4)
CHLORIDE SERPL-SCNC: 106 MMOL/L (ref 98–110)
CHOLEST SERPL-MCNC: 159 MG/DL (ref 130–200)
CO2 SERPL-SCNC: 28 MMOL/L (ref 24–31)
CREAT BLD-MCNC: 1.06 MG/DL (ref 0.5–1.4)
DEPRECATED RDW RBC AUTO: 41.8 FL (ref 40–54)
EOSINOPHIL # BLD MANUAL: 0.1 10*3/MM3 (ref 0–0.7)
EOSINOPHIL NFR BLD MANUAL: 1 % (ref 0–4)
ERYTHROCYTE [DISTWIDTH] IN BLOOD BY AUTOMATED COUNT: 12.6 % (ref 12–15)
GFR SERPL CREATININE-BSD FRML MDRD: 74 ML/MIN/1.73
GLOBULIN UR ELPH-MCNC: 2.5 GM/DL
GLUCOSE BLD-MCNC: 90 MG/DL (ref 70–100)
HBA1C MFR BLD: 5.6 %
HCT VFR BLD AUTO: 43.4 % (ref 40–52)
HDLC SERPL-MCNC: 22 MG/DL
HGB BLD-MCNC: 14.8 G/DL (ref 14–18)
LDLC/HDLC SERPL: 3.35 {RATIO}
LYMPHOCYTES # BLD MANUAL: 4.24 10*3/MM3 (ref 0.72–4.86)
LYMPHOCYTES NFR BLD MANUAL: 44 % (ref 15–45)
LYMPHOCYTES NFR BLD MANUAL: 7 % (ref 4–12)
MAXIMAL PREDICTED HEART RATE: 170 BPM
MCH RBC QN AUTO: 30.6 PG (ref 28–32)
MCHC RBC AUTO-ENTMCNC: 34.1 G/DL (ref 33–36)
MCV RBC AUTO: 89.9 FL (ref 82–95)
MONOCYTES # BLD AUTO: 0.67 10*3/MM3 (ref 0.19–1.3)
NEUTROPHILS # BLD AUTO: 4.33 10*3/MM3 (ref 1.87–8.4)
NEUTROPHILS NFR BLD MANUAL: 45 % (ref 39–78)
PERCENT MAX PREDICTED HR: 84.71 %
PLAT MORPH BLD: NORMAL
PLATELET # BLD AUTO: 317 10*3/MM3 (ref 130–400)
PMV BLD AUTO: 10.6 FL (ref 6–12)
POTASSIUM BLD-SCNC: 3.7 MMOL/L (ref 3.5–5.3)
PROT SERPL-MCNC: 6.2 G/DL (ref 6.3–8.7)
RBC # BLD AUTO: 4.83 10*6/MM3 (ref 4.8–5.9)
RBC MORPH BLD: NORMAL
SODIUM BLD-SCNC: 139 MMOL/L (ref 135–145)
STRESS BASELINE BP: NORMAL MMHG
STRESS BASELINE HR: 73 BPM
STRESS PERCENT HR: 100 %
STRESS POST ESTIMATED WORKLOAD: 13.5 METS
STRESS POST EXERCISE DUR MIN: 9 MIN
STRESS POST EXERCISE DUR SEC: 27 SEC
STRESS POST PEAK BP: NORMAL MMHG
STRESS POST PEAK HR: 144 BPM
STRESS TARGET HR: 145 BPM
TRIGL SERPL-MCNC: 316 MG/DL (ref 0–149)
TROPONIN I SERPL-MCNC: <0.012 NG/ML (ref 0–0.03)
TSH SERPL DL<=0.05 MIU/L-ACNC: 1.83 MIU/ML (ref 0.47–4.68)
WBC MORPH BLD: NORMAL
WBC NRBC COR # BLD: 9.63 10*3/MM3 (ref 4.8–10.8)

## 2019-06-14 PROCEDURE — 99152 MOD SED SAME PHYS/QHP 5/>YRS: CPT | Performed by: INTERNAL MEDICINE

## 2019-06-14 PROCEDURE — G0378 HOSPITAL OBSERVATION PER HR: HCPCS

## 2019-06-14 PROCEDURE — 93352 ADMIN ECG CONTRAST AGENT: CPT | Performed by: INTERNAL MEDICINE

## 2019-06-14 PROCEDURE — 93458 L HRT ARTERY/VENTRICLE ANGIO: CPT | Performed by: INTERNAL MEDICINE

## 2019-06-14 PROCEDURE — 93018 CV STRESS TEST I&R ONLY: CPT | Performed by: INTERNAL MEDICINE

## 2019-06-14 PROCEDURE — 83036 HEMOGLOBIN GLYCOSYLATED A1C: CPT | Performed by: FAMILY MEDICINE

## 2019-06-14 PROCEDURE — 93350 STRESS TTE ONLY: CPT | Performed by: INTERNAL MEDICINE

## 2019-06-14 PROCEDURE — 25010000002 PERFLUTREN 6.52 MG/ML SUSPENSION: Performed by: INTERNAL MEDICINE

## 2019-06-14 PROCEDURE — 25010000002 MIDAZOLAM PER 1 MG: Performed by: INTERNAL MEDICINE

## 2019-06-14 PROCEDURE — 25010000002 HEPARIN (PORCINE): Performed by: INTERNAL MEDICINE

## 2019-06-14 PROCEDURE — C1760 CLOSURE DEV, VASC: HCPCS | Performed by: INTERNAL MEDICINE

## 2019-06-14 PROCEDURE — 84484 ASSAY OF TROPONIN QUANT: CPT | Performed by: FAMILY MEDICINE

## 2019-06-14 PROCEDURE — 93350 STRESS TTE ONLY: CPT

## 2019-06-14 PROCEDURE — 94799 UNLISTED PULMONARY SVC/PX: CPT

## 2019-06-14 PROCEDURE — 80053 COMPREHEN METABOLIC PANEL: CPT | Performed by: FAMILY MEDICINE

## 2019-06-14 PROCEDURE — 25010000002 DIPHENHYDRAMINE PER 50 MG: Performed by: INTERNAL MEDICINE

## 2019-06-14 PROCEDURE — C1894 INTRO/SHEATH, NON-LASER: HCPCS | Performed by: INTERNAL MEDICINE

## 2019-06-14 PROCEDURE — 80061 LIPID PANEL: CPT | Performed by: FAMILY MEDICINE

## 2019-06-14 PROCEDURE — 85025 COMPLETE CBC W/AUTO DIFF WBC: CPT | Performed by: FAMILY MEDICINE

## 2019-06-14 PROCEDURE — 93017 CV STRESS TEST TRACING ONLY: CPT

## 2019-06-14 PROCEDURE — 99204 OFFICE O/P NEW MOD 45 MIN: CPT | Performed by: INTERNAL MEDICINE

## 2019-06-14 PROCEDURE — 84443 ASSAY THYROID STIM HORMONE: CPT | Performed by: FAMILY MEDICINE

## 2019-06-14 PROCEDURE — 85007 BL SMEAR W/DIFF WBC COUNT: CPT | Performed by: FAMILY MEDICINE

## 2019-06-14 PROCEDURE — 96376 TX/PRO/DX INJ SAME DRUG ADON: CPT

## 2019-06-14 PROCEDURE — 94760 N-INVAS EAR/PLS OXIMETRY 1: CPT

## 2019-06-14 PROCEDURE — 25010000002 FENTANYL CITRATE (PF) 100 MCG/2ML SOLUTION: Performed by: INTERNAL MEDICINE

## 2019-06-14 PROCEDURE — 0 IOPAMIDOL PER 1 ML: Performed by: INTERNAL MEDICINE

## 2019-06-14 RX ORDER — ATORVASTATIN CALCIUM 40 MG/1
40 TABLET, FILM COATED ORAL NIGHTLY
Status: DISCONTINUED | OUTPATIENT
Start: 2019-06-14 | End: 2019-06-17 | Stop reason: HOSPADM

## 2019-06-14 RX ORDER — FENTANYL CITRATE 50 UG/ML
INJECTION, SOLUTION INTRAMUSCULAR; INTRAVENOUS AS NEEDED
Status: DISCONTINUED | OUTPATIENT
Start: 2019-06-14 | End: 2019-06-14 | Stop reason: HOSPADM

## 2019-06-14 RX ORDER — LIDOCAINE HYDROCHLORIDE 20 MG/ML
INJECTION, SOLUTION INFILTRATION; PERINEURAL AS NEEDED
Status: DISCONTINUED | OUTPATIENT
Start: 2019-06-14 | End: 2019-06-14 | Stop reason: HOSPADM

## 2019-06-14 RX ORDER — SODIUM CHLORIDE 0.9 % (FLUSH) 0.9 %
3 SYRINGE (ML) INJECTION EVERY 12 HOURS SCHEDULED
Status: DISCONTINUED | OUTPATIENT
Start: 2019-06-14 | End: 2019-06-17 | Stop reason: HOSPADM

## 2019-06-14 RX ORDER — MIDAZOLAM HYDROCHLORIDE 1 MG/ML
INJECTION INTRAMUSCULAR; INTRAVENOUS AS NEEDED
Status: DISCONTINUED | OUTPATIENT
Start: 2019-06-14 | End: 2019-06-14 | Stop reason: HOSPADM

## 2019-06-14 RX ORDER — SODIUM CHLORIDE 9 MG/ML
100 INJECTION, SOLUTION INTRAVENOUS CONTINUOUS
Status: DISPENSED | OUTPATIENT
Start: 2019-06-14 | End: 2019-06-14

## 2019-06-14 RX ORDER — ISOSORBIDE MONONITRATE 30 MG/1
30 TABLET, EXTENDED RELEASE ORAL
Status: DISCONTINUED | OUTPATIENT
Start: 2019-06-15 | End: 2019-06-17 | Stop reason: HOSPADM

## 2019-06-14 RX ORDER — ACETAMINOPHEN 325 MG/1
650 TABLET ORAL EVERY 4 HOURS PRN
Status: DISCONTINUED | OUTPATIENT
Start: 2019-06-14 | End: 2019-06-17 | Stop reason: HOSPADM

## 2019-06-14 RX ORDER — SODIUM CHLORIDE 0.9 % (FLUSH) 0.9 %
3-10 SYRINGE (ML) INJECTION AS NEEDED
Status: DISCONTINUED | OUTPATIENT
Start: 2019-06-14 | End: 2019-06-17 | Stop reason: HOSPADM

## 2019-06-14 RX ORDER — DIPHENHYDRAMINE HYDROCHLORIDE 50 MG/ML
INJECTION INTRAMUSCULAR; INTRAVENOUS AS NEEDED
Status: DISCONTINUED | OUTPATIENT
Start: 2019-06-14 | End: 2019-06-14 | Stop reason: HOSPADM

## 2019-06-14 RX ORDER — ONDANSETRON 2 MG/ML
4 INJECTION INTRAMUSCULAR; INTRAVENOUS EVERY 6 HOURS PRN
Status: DISCONTINUED | OUTPATIENT
Start: 2019-06-14 | End: 2019-06-14 | Stop reason: SDUPTHER

## 2019-06-14 RX ADMIN — HYDROCHLOROTHIAZIDE 25 MG: 25 TABLET ORAL at 08:46

## 2019-06-14 RX ADMIN — NICOTINE 1 PATCH: 21 PATCH, EXTENDED RELEASE TRANSDERMAL at 19:54

## 2019-06-14 RX ADMIN — ACETAMINOPHEN 650 MG: 325 TABLET ORAL at 08:45

## 2019-06-14 RX ADMIN — ATORVASTATIN CALCIUM 40 MG: 40 TABLET, FILM COATED ORAL at 21:26

## 2019-06-14 RX ADMIN — ASPIRIN 81 MG: 81 TABLET, CHEWABLE ORAL at 08:46

## 2019-06-14 RX ADMIN — FAMOTIDINE 20 MG: 10 INJECTION INTRAVENOUS at 21:26

## 2019-06-14 RX ADMIN — SODIUM CHLORIDE, PRESERVATIVE FREE 3 ML: 5 INJECTION INTRAVENOUS at 22:24

## 2019-06-14 RX ADMIN — METOPROLOL TARTRATE 12.5 MG: 25 TABLET, FILM COATED ORAL at 21:56

## 2019-06-14 RX ADMIN — LISINOPRIL 40 MG: 20 TABLET ORAL at 08:46

## 2019-06-14 RX ADMIN — ACETAMINOPHEN 650 MG: 325 TABLET ORAL at 21:25

## 2019-06-14 RX ADMIN — SODIUM CHLORIDE 100 ML/HR: 9 INJECTION, SOLUTION INTRAVENOUS at 17:32

## 2019-06-14 RX ADMIN — FAMOTIDINE 20 MG: 10 INJECTION INTRAVENOUS at 08:45

## 2019-06-14 RX ADMIN — SODIUM CHLORIDE, PRESERVATIVE FREE 3 ML: 5 INJECTION INTRAVENOUS at 08:45

## 2019-06-14 RX ADMIN — PERFLUTREN 8.48 MG: 6.52 INJECTION, SUSPENSION INTRAVENOUS at 07:43

## 2019-06-15 ENCOUNTER — APPOINTMENT (OUTPATIENT)
Dept: CARDIOLOGY | Facility: HOSPITAL | Age: 49
End: 2019-06-15

## 2019-06-15 LAB
ALBUMIN SERPL-MCNC: 4.1 G/DL (ref 3.5–5)
ALBUMIN/GLOB SERPL: 1.6 G/DL (ref 1.1–2.5)
ALP SERPL-CCNC: 76 U/L (ref 24–120)
ALT SERPL W P-5'-P-CCNC: 28 U/L (ref 0–54)
ANION GAP SERPL CALCULATED.3IONS-SCNC: 6 MMOL/L (ref 4–13)
AST SERPL-CCNC: 21 U/L (ref 7–45)
BASOPHILS # BLD AUTO: 0.17 10*3/MM3 (ref 0–0.2)
BASOPHILS NFR BLD AUTO: 1.4 % (ref 0–2)
BH CV ECHO MEAS - AO MAX PG (FULL): 2.1 MMHG
BH CV ECHO MEAS - AO MAX PG: 6.6 MMHG
BH CV ECHO MEAS - AO MEAN PG (FULL): 2.5 MMHG
BH CV ECHO MEAS - AO MEAN PG: 3.5 MMHG
BH CV ECHO MEAS - AO ROOT AREA (BSA CORRECTED): 1.4
BH CV ECHO MEAS - AO ROOT AREA: 6.2 CM^2
BH CV ECHO MEAS - AO ROOT DIAM: 2.8 CM
BH CV ECHO MEAS - AO V2 MAX: 128 CM/SEC
BH CV ECHO MEAS - AO V2 MEAN: 82.2 CM/SEC
BH CV ECHO MEAS - AO V2 VTI: 23.1 CM
BH CV ECHO MEAS - AVA(I,A): 2.7 CM^2
BH CV ECHO MEAS - AVA(I,D): 2.7 CM^2
BH CV ECHO MEAS - AVA(V,A): 3.1 CM^2
BH CV ECHO MEAS - AVA(V,D): 3.1 CM^2
BH CV ECHO MEAS - BSA(HAYCOCK): 2 M^2
BH CV ECHO MEAS - BSA: 1.9 M^2
BH CV ECHO MEAS - BZI_BMI: 31.8 KILOGRAMS/M^2
BH CV ECHO MEAS - BZI_METRIC_HEIGHT: 165.1 CM
BH CV ECHO MEAS - BZI_METRIC_WEIGHT: 86.6 KG
BH CV ECHO MEAS - EDV(CUBED): 146.4 ML
BH CV ECHO MEAS - EDV(MOD-SP4): 74.1 ML
BH CV ECHO MEAS - EDV(TEICH): 133.6 ML
BH CV ECHO MEAS - EF(CUBED): 80.4 %
BH CV ECHO MEAS - EF(MOD-SP4): 54.7 %
BH CV ECHO MEAS - EF(TEICH): 72.5 %
BH CV ECHO MEAS - ESV(CUBED): 28.7 ML
BH CV ECHO MEAS - ESV(MOD-SP4): 33.6 ML
BH CV ECHO MEAS - ESV(TEICH): 36.7 ML
BH CV ECHO MEAS - FS: 41.9 %
BH CV ECHO MEAS - IVS/LVPW: 1.2
BH CV ECHO MEAS - IVSD: 0.89 CM
BH CV ECHO MEAS - LA DIMENSION: 3.5 CM
BH CV ECHO MEAS - LA/AO: 1.3
BH CV ECHO MEAS - LAT PEAK E' VEL: 7.2 CM/SEC
BH CV ECHO MEAS - LV DIASTOLIC VOL/BSA (35-75): 38.2 ML/M^2
BH CV ECHO MEAS - LV MASS(C)D: 150.4 GRAMS
BH CV ECHO MEAS - LV MASS(C)DI: 77.5 GRAMS/M^2
BH CV ECHO MEAS - LV MAX PG: 4.4 MMHG
BH CV ECHO MEAS - LV MEAN PG: 1 MMHG
BH CV ECHO MEAS - LV SYSTOLIC VOL/BSA (12-30): 17.3 ML/M^2
BH CV ECHO MEAS - LV V1 MAX: 104.1 CM/SEC
BH CV ECHO MEAS - LV V1 MEAN: 55.6 CM/SEC
BH CV ECHO MEAS - LV V1 VTI: 16.2 CM
BH CV ECHO MEAS - LVIDD: 5.3 CM
BH CV ECHO MEAS - LVIDS: 3.1 CM
BH CV ECHO MEAS - LVLD AP4: 8 CM
BH CV ECHO MEAS - LVLS AP4: 7.5 CM
BH CV ECHO MEAS - LVOT AREA (M): 3.8 CM^2
BH CV ECHO MEAS - LVOT AREA: 3.8 CM^2
BH CV ECHO MEAS - LVOT DIAM: 2.2 CM
BH CV ECHO MEAS - LVPWD: 0.73 CM
BH CV ECHO MEAS - MED PEAK E' VEL: 4.62 CM/SEC
BH CV ECHO MEAS - MR MAX PG: 17.3 MMHG
BH CV ECHO MEAS - MR MAX VEL: 208 CM/SEC
BH CV ECHO MEAS - MV A MAX VEL: 76.4 CM/SEC
BH CV ECHO MEAS - MV DEC TIME: 0.25 SEC
BH CV ECHO MEAS - MV E MAX VEL: 63.7 CM/SEC
BH CV ECHO MEAS - MV E/A: 0.83
BH CV ECHO MEAS - RAP SYSTOLE: 5 MMHG
BH CV ECHO MEAS - RVSP: 18.7 MMHG
BH CV ECHO MEAS - SI(AO): 73.2 ML/M^2
BH CV ECHO MEAS - SI(CUBED): 60.7 ML/M^2
BH CV ECHO MEAS - SI(LVOT): 31.7 ML/M^2
BH CV ECHO MEAS - SI(MOD-SP4): 20.9 ML/M^2
BH CV ECHO MEAS - SI(TEICH): 49.9 ML/M^2
BH CV ECHO MEAS - SV(AO): 141.9 ML
BH CV ECHO MEAS - SV(CUBED): 117.7 ML
BH CV ECHO MEAS - SV(LVOT): 61.6 ML
BH CV ECHO MEAS - SV(MOD-SP4): 40.5 ML
BH CV ECHO MEAS - SV(TEICH): 96.8 ML
BH CV ECHO MEAS - TR MAX VEL: 185 CM/SEC
BH CV ECHO MEASUREMENTS AVERAGE E/E' RATIO: 10.78
BILIRUB SERPL-MCNC: 0.4 MG/DL (ref 0.1–1)
BUN BLD-MCNC: 8 MG/DL (ref 5–21)
BUN/CREAT SERPL: 7.5 (ref 7–25)
CALCIUM SPEC-SCNC: 9.3 MG/DL (ref 8.4–10.4)
CHLORIDE SERPL-SCNC: 104 MMOL/L (ref 98–110)
CO2 SERPL-SCNC: 29 MMOL/L (ref 24–31)
CREAT BLD-MCNC: 1.06 MG/DL (ref 0.5–1.4)
DEPRECATED RDW RBC AUTO: 42.5 FL (ref 40–54)
EOSINOPHIL # BLD AUTO: 0.28 10*3/MM3 (ref 0–0.7)
EOSINOPHIL NFR BLD AUTO: 2.3 % (ref 0–4)
ERYTHROCYTE [DISTWIDTH] IN BLOOD BY AUTOMATED COUNT: 12.6 % (ref 12–15)
GFR SERPL CREATININE-BSD FRML MDRD: 74 ML/MIN/1.73
GLOBULIN UR ELPH-MCNC: 2.5 GM/DL
GLUCOSE BLD-MCNC: 86 MG/DL (ref 70–100)
HCT VFR BLD AUTO: 46.6 % (ref 40–52)
HGB BLD-MCNC: 15.7 G/DL (ref 14–18)
IMM GRANULOCYTES # BLD AUTO: 0.05 10*3/MM3 (ref 0–0.05)
IMM GRANULOCYTES NFR BLD AUTO: 0.4 % (ref 0–5)
LEFT ATRIUM VOLUME INDEX: 16.4 ML/M2
LEFT ATRIUM VOLUME: 31.9 CM3
LYMPHOCYTES # BLD AUTO: 4.38 10*3/MM3 (ref 0.72–4.86)
LYMPHOCYTES NFR BLD AUTO: 36.5 % (ref 15–45)
MAXIMAL PREDICTED HEART RATE: 170 BPM
MCH RBC QN AUTO: 30.7 PG (ref 28–32)
MCHC RBC AUTO-ENTMCNC: 33.7 G/DL (ref 33–36)
MCV RBC AUTO: 91 FL (ref 82–95)
MONOCYTES # BLD AUTO: 0.76 10*3/MM3 (ref 0.19–1.3)
MONOCYTES NFR BLD AUTO: 6.3 % (ref 4–12)
NEUTROPHILS # BLD AUTO: 6.37 10*3/MM3 (ref 1.87–8.4)
NEUTROPHILS NFR BLD AUTO: 53.1 % (ref 39–78)
NRBC BLD AUTO-RTO: 0 /100 WBC (ref 0–0.2)
PLATELET # BLD AUTO: 339 10*3/MM3 (ref 130–400)
PMV BLD AUTO: 10.4 FL (ref 6–12)
POTASSIUM BLD-SCNC: 5.1 MMOL/L (ref 3.5–5.3)
PROT SERPL-MCNC: 6.6 G/DL (ref 6.3–8.7)
RBC # BLD AUTO: 5.12 10*6/MM3 (ref 4.8–5.9)
SODIUM BLD-SCNC: 139 MMOL/L (ref 135–145)
STRESS TARGET HR: 145 BPM
WBC NRBC COR # BLD: 12.01 10*3/MM3 (ref 4.8–10.8)

## 2019-06-15 PROCEDURE — 99213 OFFICE O/P EST LOW 20 MIN: CPT | Performed by: PHYSICIAN ASSISTANT

## 2019-06-15 PROCEDURE — 94799 UNLISTED PULMONARY SVC/PX: CPT

## 2019-06-15 PROCEDURE — 99204 OFFICE O/P NEW MOD 45 MIN: CPT | Performed by: THORACIC SURGERY (CARDIOTHORACIC VASCULAR SURGERY)

## 2019-06-15 PROCEDURE — 80053 COMPREHEN METABOLIC PANEL: CPT | Performed by: FAMILY MEDICINE

## 2019-06-15 PROCEDURE — 93306 TTE W/DOPPLER COMPLETE: CPT

## 2019-06-15 PROCEDURE — G0378 HOSPITAL OBSERVATION PER HR: HCPCS

## 2019-06-15 PROCEDURE — 93306 TTE W/DOPPLER COMPLETE: CPT | Performed by: INTERNAL MEDICINE

## 2019-06-15 PROCEDURE — 85025 COMPLETE CBC W/AUTO DIFF WBC: CPT | Performed by: FAMILY MEDICINE

## 2019-06-15 RX ORDER — FAMOTIDINE 20 MG/1
20 TABLET, FILM COATED ORAL
Status: DISCONTINUED | OUTPATIENT
Start: 2019-06-15 | End: 2019-06-17 | Stop reason: HOSPADM

## 2019-06-15 RX ADMIN — HYDROCHLOROTHIAZIDE 25 MG: 25 TABLET ORAL at 08:28

## 2019-06-15 RX ADMIN — ATORVASTATIN CALCIUM 40 MG: 40 TABLET, FILM COATED ORAL at 20:38

## 2019-06-15 RX ADMIN — ASPIRIN 81 MG: 81 TABLET, CHEWABLE ORAL at 08:27

## 2019-06-15 RX ADMIN — ISOSORBIDE MONONITRATE 30 MG: 30 TABLET, EXTENDED RELEASE ORAL at 08:28

## 2019-06-15 RX ADMIN — SODIUM CHLORIDE, PRESERVATIVE FREE 3 ML: 5 INJECTION INTRAVENOUS at 20:38

## 2019-06-15 RX ADMIN — METOPROLOL TARTRATE 12.5 MG: 25 TABLET, FILM COATED ORAL at 08:28

## 2019-06-15 RX ADMIN — NITROGLYCERIN 0.4 MG: 0.4 TABLET SUBLINGUAL at 01:43

## 2019-06-15 RX ADMIN — ACETAMINOPHEN 650 MG: 325 TABLET ORAL at 20:38

## 2019-06-15 RX ADMIN — ACETAMINOPHEN 650 MG: 325 TABLET ORAL at 08:27

## 2019-06-15 RX ADMIN — FAMOTIDINE 20 MG: 20 TABLET, FILM COATED ORAL at 16:45

## 2019-06-15 RX ADMIN — NICOTINE 1 PATCH: 21 PATCH, EXTENDED RELEASE TRANSDERMAL at 19:31

## 2019-06-15 RX ADMIN — LISINOPRIL 40 MG: 20 TABLET ORAL at 08:27

## 2019-06-15 RX ADMIN — SODIUM CHLORIDE, PRESERVATIVE FREE 3 ML: 5 INJECTION INTRAVENOUS at 08:28

## 2019-06-15 RX ADMIN — FAMOTIDINE 20 MG: 10 INJECTION INTRAVENOUS at 08:27

## 2019-06-15 RX ADMIN — METOPROLOL TARTRATE 12.5 MG: 25 TABLET, FILM COATED ORAL at 20:38

## 2019-06-16 ENCOUNTER — APPOINTMENT (OUTPATIENT)
Dept: CT IMAGING | Facility: HOSPITAL | Age: 49
End: 2019-06-16

## 2019-06-16 ENCOUNTER — APPOINTMENT (OUTPATIENT)
Dept: ULTRASOUND IMAGING | Facility: HOSPITAL | Age: 49
End: 2019-06-16

## 2019-06-16 LAB
ALBUMIN SERPL-MCNC: 4.1 G/DL (ref 3.5–5)
ALBUMIN/GLOB SERPL: 1.6 G/DL (ref 1.1–2.5)
ALP SERPL-CCNC: 84 U/L (ref 24–120)
ALT SERPL W P-5'-P-CCNC: 24 U/L (ref 0–54)
ANION GAP SERPL CALCULATED.3IONS-SCNC: 13 MMOL/L (ref 4–13)
AST SERPL-CCNC: 21 U/L (ref 7–45)
BASOPHILS # BLD AUTO: 0.13 10*3/MM3 (ref 0–0.2)
BASOPHILS NFR BLD AUTO: 1.4 % (ref 0–2)
BILIRUB SERPL-MCNC: 0.4 MG/DL (ref 0.1–1)
BUN BLD-MCNC: 11 MG/DL (ref 5–21)
BUN/CREAT SERPL: 12 (ref 7–25)
CALCIUM SPEC-SCNC: 9.5 MG/DL (ref 8.4–10.4)
CHLORIDE SERPL-SCNC: 99 MMOL/L (ref 98–110)
CO2 SERPL-SCNC: 24 MMOL/L (ref 24–31)
CREAT BLD-MCNC: 0.92 MG/DL (ref 0.5–1.4)
DEPRECATED RDW RBC AUTO: 41.5 FL (ref 40–54)
EOSINOPHIL # BLD AUTO: 0.27 10*3/MM3 (ref 0–0.7)
EOSINOPHIL NFR BLD AUTO: 2.8 % (ref 0–4)
ERYTHROCYTE [DISTWIDTH] IN BLOOD BY AUTOMATED COUNT: 12.5 % (ref 12–15)
GFR SERPL CREATININE-BSD FRML MDRD: 87 ML/MIN/1.73
GLOBULIN UR ELPH-MCNC: 2.5 GM/DL
GLUCOSE BLD-MCNC: 155 MG/DL (ref 70–100)
HCT VFR BLD AUTO: 46.4 % (ref 40–52)
HGB BLD-MCNC: 15.9 G/DL (ref 14–18)
IMM GRANULOCYTES # BLD AUTO: 0.03 10*3/MM3 (ref 0–0.05)
IMM GRANULOCYTES NFR BLD AUTO: 0.3 % (ref 0–5)
LYMPHOCYTES # BLD AUTO: 3.94 10*3/MM3 (ref 0.72–4.86)
LYMPHOCYTES NFR BLD AUTO: 41.2 % (ref 15–45)
MCH RBC QN AUTO: 30.9 PG (ref 28–32)
MCHC RBC AUTO-ENTMCNC: 34.3 G/DL (ref 33–36)
MCV RBC AUTO: 90.1 FL (ref 82–95)
MONOCYTES # BLD AUTO: 0.66 10*3/MM3 (ref 0.19–1.3)
MONOCYTES NFR BLD AUTO: 6.9 % (ref 4–12)
NEUTROPHILS # BLD AUTO: 4.54 10*3/MM3 (ref 1.87–8.4)
NEUTROPHILS NFR BLD AUTO: 47.4 % (ref 39–78)
NRBC BLD AUTO-RTO: 0 /100 WBC (ref 0–0.2)
PLATELET # BLD AUTO: 318 10*3/MM3 (ref 130–400)
PMV BLD AUTO: 10.8 FL (ref 6–12)
POTASSIUM BLD-SCNC: 3.9 MMOL/L (ref 3.5–5.3)
PROT SERPL-MCNC: 6.6 G/DL (ref 6.3–8.7)
RBC # BLD AUTO: 5.15 10*6/MM3 (ref 4.8–5.9)
SODIUM BLD-SCNC: 136 MMOL/L (ref 135–145)
WBC NRBC COR # BLD: 9.57 10*3/MM3 (ref 4.8–10.8)

## 2019-06-16 PROCEDURE — 80053 COMPREHEN METABOLIC PANEL: CPT | Performed by: FAMILY MEDICINE

## 2019-06-16 PROCEDURE — G0378 HOSPITAL OBSERVATION PER HR: HCPCS

## 2019-06-16 PROCEDURE — 71260 CT THORAX DX C+: CPT

## 2019-06-16 PROCEDURE — 99212 OFFICE O/P EST SF 10 MIN: CPT | Performed by: THORACIC SURGERY (CARDIOTHORACIC VASCULAR SURGERY)

## 2019-06-16 PROCEDURE — 93880 EXTRACRANIAL BILAT STUDY: CPT | Performed by: SURGERY

## 2019-06-16 PROCEDURE — 85025 COMPLETE CBC W/AUTO DIFF WBC: CPT | Performed by: FAMILY MEDICINE

## 2019-06-16 PROCEDURE — 25010000002 IOPAMIDOL 61 % SOLUTION: Performed by: FAMILY MEDICINE

## 2019-06-16 PROCEDURE — 93880 EXTRACRANIAL BILAT STUDY: CPT

## 2019-06-16 PROCEDURE — 99213 OFFICE O/P EST LOW 20 MIN: CPT | Performed by: PHYSICIAN ASSISTANT

## 2019-06-16 RX ADMIN — ATORVASTATIN CALCIUM 40 MG: 40 TABLET, FILM COATED ORAL at 21:48

## 2019-06-16 RX ADMIN — SODIUM CHLORIDE, PRESERVATIVE FREE 3 ML: 5 INJECTION INTRAVENOUS at 08:33

## 2019-06-16 RX ADMIN — NICOTINE 1 PATCH: 21 PATCH, EXTENDED RELEASE TRANSDERMAL at 18:58

## 2019-06-16 RX ADMIN — FAMOTIDINE 20 MG: 20 TABLET, FILM COATED ORAL at 08:32

## 2019-06-16 RX ADMIN — METOPROLOL TARTRATE 12.5 MG: 25 TABLET, FILM COATED ORAL at 21:48

## 2019-06-16 RX ADMIN — IOPAMIDOL 100 ML: 612 INJECTION, SOLUTION INTRAVENOUS at 13:45

## 2019-06-16 RX ADMIN — METOPROLOL TARTRATE 12.5 MG: 25 TABLET, FILM COATED ORAL at 08:32

## 2019-06-16 RX ADMIN — HYDROCHLOROTHIAZIDE 25 MG: 25 TABLET ORAL at 08:32

## 2019-06-16 RX ADMIN — ACETAMINOPHEN 650 MG: 325 TABLET ORAL at 08:31

## 2019-06-16 RX ADMIN — LISINOPRIL 40 MG: 20 TABLET ORAL at 08:32

## 2019-06-16 RX ADMIN — SODIUM CHLORIDE, PRESERVATIVE FREE 3 ML: 5 INJECTION INTRAVENOUS at 21:49

## 2019-06-16 RX ADMIN — ISOSORBIDE MONONITRATE 30 MG: 30 TABLET, EXTENDED RELEASE ORAL at 08:31

## 2019-06-16 RX ADMIN — ACETAMINOPHEN 650 MG: 325 TABLET ORAL at 21:48

## 2019-06-16 RX ADMIN — FAMOTIDINE 20 MG: 20 TABLET, FILM COATED ORAL at 17:44

## 2019-06-16 RX ADMIN — ASPIRIN 81 MG: 81 TABLET, CHEWABLE ORAL at 08:32

## 2019-06-17 ENCOUNTER — APPOINTMENT (OUTPATIENT)
Dept: PULMONOLOGY | Facility: HOSPITAL | Age: 49
End: 2019-06-17

## 2019-06-17 ENCOUNTER — TELEPHONE (OUTPATIENT)
Dept: CARDIAC SURGERY | Facility: CLINIC | Age: 49
End: 2019-06-17

## 2019-06-17 VITALS
OXYGEN SATURATION: 96 % | WEIGHT: 190 LBS | DIASTOLIC BLOOD PRESSURE: 58 MMHG | RESPIRATION RATE: 18 BRPM | TEMPERATURE: 98.3 F | HEIGHT: 65 IN | SYSTOLIC BLOOD PRESSURE: 109 MMHG | HEART RATE: 79 BPM | BODY MASS INDEX: 31.65 KG/M2

## 2019-06-17 PROBLEM — I25.10 CORONARY ARTERY DISEASE INVOLVING NATIVE CORONARY ARTERY OF NATIVE HEART WITHOUT ANGINA PECTORIS: Status: ACTIVE | Noted: 2019-06-17

## 2019-06-17 LAB
ALBUMIN SERPL-MCNC: 4.3 G/DL (ref 3.5–5)
ALBUMIN/GLOB SERPL: 1.7 G/DL (ref 1.1–2.5)
ALP SERPL-CCNC: 92 U/L (ref 24–120)
ALT SERPL W P-5'-P-CCNC: 28 U/L (ref 0–54)
ANION GAP SERPL CALCULATED.3IONS-SCNC: 10 MMOL/L (ref 4–13)
ARTERIAL PATENCY WRIST A: ABNORMAL
AST SERPL-CCNC: 23 U/L (ref 7–45)
ATMOSPHERIC PRESS: 749 MMHG
BASE EXCESS BLDA CALC-SCNC: 1.9 MMOL/L (ref 0–2)
BASOPHILS # BLD AUTO: 0.16 10*3/MM3 (ref 0–0.2)
BASOPHILS NFR BLD AUTO: 1.2 % (ref 0–2)
BDY SITE: ABNORMAL
BILIRUB SERPL-MCNC: 0.3 MG/DL (ref 0.1–1)
BODY TEMPERATURE: 37 C
BUN BLD-MCNC: 13 MG/DL (ref 5–21)
BUN/CREAT SERPL: 12.7 (ref 7–25)
CALCIUM SPEC-SCNC: 9.8 MG/DL (ref 8.4–10.4)
CHLORIDE SERPL-SCNC: 99 MMOL/L (ref 98–110)
CO2 SERPL-SCNC: 29 MMOL/L (ref 24–31)
CREAT BLD-MCNC: 1.02 MG/DL (ref 0.5–1.4)
DEPRECATED RDW RBC AUTO: 39.3 FL (ref 40–54)
EOSINOPHIL # BLD AUTO: 0.33 10*3/MM3 (ref 0–0.7)
EOSINOPHIL NFR BLD AUTO: 2.5 % (ref 0–4)
ERYTHROCYTE [DISTWIDTH] IN BLOOD BY AUTOMATED COUNT: 12.5 % (ref 12–15)
GFR SERPL CREATININE-BSD FRML MDRD: 77 ML/MIN/1.73
GLOBULIN UR ELPH-MCNC: 2.6 GM/DL
GLUCOSE BLD-MCNC: 103 MG/DL (ref 70–100)
HCO3 BLDA-SCNC: 26.3 MMOL/L (ref 20–26)
HCT VFR BLD AUTO: 46.4 % (ref 40–52)
HGB BLD-MCNC: 16.2 G/DL (ref 14–18)
IMM GRANULOCYTES # BLD AUTO: 0.05 10*3/MM3 (ref 0–0.05)
IMM GRANULOCYTES NFR BLD AUTO: 0.4 % (ref 0–5)
LYMPHOCYTES # BLD AUTO: 4.48 10*3/MM3 (ref 0.72–4.86)
LYMPHOCYTES NFR BLD AUTO: 33.3 % (ref 15–45)
Lab: ABNORMAL
MCH RBC QN AUTO: 30.3 PG (ref 28–32)
MCHC RBC AUTO-ENTMCNC: 34.9 G/DL (ref 33–36)
MCV RBC AUTO: 86.9 FL (ref 82–95)
MODALITY: ABNORMAL
MONOCYTES # BLD AUTO: 1.03 10*3/MM3 (ref 0.19–1.3)
MONOCYTES NFR BLD AUTO: 7.7 % (ref 4–12)
NEUTROPHILS # BLD AUTO: 7.4 10*3/MM3 (ref 1.87–8.4)
NEUTROPHILS NFR BLD AUTO: 54.9 % (ref 39–78)
NRBC BLD AUTO-RTO: 0 /100 WBC (ref 0–0.2)
PCO2 BLDA: 39.7 MM HG (ref 35–45)
PH BLDA: 7.43 PH UNITS (ref 7.35–7.45)
PLATELET # BLD AUTO: 347 10*3/MM3 (ref 130–400)
PMV BLD AUTO: 10.4 FL (ref 6–12)
PO2 BLDA: 77 MM HG (ref 83–108)
POTASSIUM BLD-SCNC: 4.3 MMOL/L (ref 3.5–5.3)
PROT SERPL-MCNC: 6.9 G/DL (ref 6.3–8.7)
RBC # BLD AUTO: 5.34 10*6/MM3 (ref 4.8–5.9)
SAO2 % BLDCOA: 95.7 % (ref 94–99)
SODIUM BLD-SCNC: 138 MMOL/L (ref 135–145)
VENTILATOR MODE: ABNORMAL
WBC NRBC COR # BLD: 13.45 10*3/MM3 (ref 4.8–10.8)

## 2019-06-17 PROCEDURE — 80053 COMPREHEN METABOLIC PANEL: CPT | Performed by: FAMILY MEDICINE

## 2019-06-17 PROCEDURE — 99213 OFFICE O/P EST LOW 20 MIN: CPT | Performed by: NURSE PRACTITIONER

## 2019-06-17 PROCEDURE — 82803 BLOOD GASES ANY COMBINATION: CPT

## 2019-06-17 PROCEDURE — 85025 COMPLETE CBC W/AUTO DIFF WBC: CPT | Performed by: FAMILY MEDICINE

## 2019-06-17 PROCEDURE — G0378 HOSPITAL OBSERVATION PER HR: HCPCS

## 2019-06-17 PROCEDURE — 94060 EVALUATION OF WHEEZING: CPT

## 2019-06-17 PROCEDURE — 94010 BREATHING CAPACITY TEST: CPT | Performed by: INTERNAL MEDICINE

## 2019-06-17 PROCEDURE — 36600 WITHDRAWAL OF ARTERIAL BLOOD: CPT

## 2019-06-17 RX ORDER — ASPIRIN 81 MG/1
81 TABLET, CHEWABLE ORAL DAILY
Qty: 30 TABLET | Refills: 0 | Status: SHIPPED | OUTPATIENT
Start: 2019-06-18 | End: 2019-06-17

## 2019-06-17 RX ORDER — ATORVASTATIN CALCIUM 40 MG/1
40 TABLET, FILM COATED ORAL NIGHTLY
Qty: 30 TABLET | Refills: 0 | Status: SHIPPED | OUTPATIENT
Start: 2019-06-17 | End: 2019-07-31 | Stop reason: SDUPTHER

## 2019-06-17 RX ORDER — ISOSORBIDE MONONITRATE 30 MG/1
30 TABLET, EXTENDED RELEASE ORAL
Qty: 30 TABLET | Refills: 0 | Status: SHIPPED | OUTPATIENT
Start: 2019-06-18 | End: 2019-07-01 | Stop reason: HOSPADM

## 2019-06-17 RX ORDER — ALBUTEROL SULFATE 2.5 MG/3ML
2.5 SOLUTION RESPIRATORY (INHALATION) ONCE
Status: DISCONTINUED | OUTPATIENT
Start: 2019-06-17 | End: 2019-06-17 | Stop reason: HOSPADM

## 2019-06-17 RX ORDER — ATORVASTATIN CALCIUM 40 MG/1
40 TABLET, FILM COATED ORAL NIGHTLY
Qty: 30 TABLET | Refills: 0 | Status: SHIPPED | OUTPATIENT
Start: 2019-06-17 | End: 2019-06-17

## 2019-06-17 RX ORDER — NITROGLYCERIN 0.4 MG/1
0.4 TABLET SUBLINGUAL
Qty: 30 TABLET | Refills: 12 | Status: SHIPPED | OUTPATIENT
Start: 2019-06-17 | End: 2019-06-17

## 2019-06-17 RX ORDER — ASPIRIN 81 MG/1
81 TABLET, CHEWABLE ORAL DAILY
Qty: 30 TABLET | Refills: 0 | Status: SHIPPED | OUTPATIENT
Start: 2019-06-18

## 2019-06-17 RX ORDER — ISOSORBIDE MONONITRATE 30 MG/1
30 TABLET, EXTENDED RELEASE ORAL
Qty: 30 TABLET | Refills: 0 | Status: SHIPPED | OUTPATIENT
Start: 2019-06-18 | End: 2019-06-17

## 2019-06-17 RX ORDER — NITROGLYCERIN 0.4 MG/1
0.4 TABLET SUBLINGUAL
Qty: 30 TABLET | Refills: 12 | Status: SHIPPED | OUTPATIENT
Start: 2019-06-17

## 2019-06-17 RX ADMIN — METOPROLOL TARTRATE 12.5 MG: 25 TABLET, FILM COATED ORAL at 09:28

## 2019-06-17 RX ADMIN — ASPIRIN 81 MG: 81 TABLET, CHEWABLE ORAL at 09:28

## 2019-06-17 RX ADMIN — ISOSORBIDE MONONITRATE 30 MG: 30 TABLET, EXTENDED RELEASE ORAL at 09:28

## 2019-06-17 RX ADMIN — SODIUM CHLORIDE, PRESERVATIVE FREE 3 ML: 5 INJECTION INTRAVENOUS at 09:29

## 2019-06-17 RX ADMIN — HYDROCHLOROTHIAZIDE 25 MG: 25 TABLET ORAL at 09:28

## 2019-06-17 RX ADMIN — ACETAMINOPHEN 650 MG: 325 TABLET, FILM COATED ORAL at 09:28

## 2019-06-17 RX ADMIN — FAMOTIDINE 20 MG: 20 TABLET, FILM COATED ORAL at 09:28

## 2019-06-17 RX ADMIN — LISINOPRIL 40 MG: 20 TABLET ORAL at 09:28

## 2019-06-17 NOTE — TELEPHONE ENCOUNTER
Calling to get appt for pt to see Dr Monzon next week to discuss sched surgery.  Pt being d/c'brandon/elizabeth

## 2019-06-18 ENCOUNTER — TELEPHONE (OUTPATIENT)
Dept: CARDIAC SURGERY | Facility: CLINIC | Age: 49
End: 2019-06-18

## 2019-06-18 NOTE — TELEPHONE ENCOUNTER
Spoke to patients SO and explained this to them. She stated that they wanted to keep the office visit regardless of approval and would pay for the office visit.

## 2019-06-19 NOTE — TELEPHONE ENCOUNTER
Spoke to Stefani at the [facility] and patients office visit with Dr. Monzon has been approved.

## 2019-06-20 ENCOUNTER — TELEPHONE (OUTPATIENT)
Dept: CARDIAC SURGERY | Facility: CLINIC | Age: 49
End: 2019-06-20

## 2019-06-20 NOTE — TELEPHONE ENCOUNTER
Pts gf called to ask about approval from [facility] for office visit and surgery. Told her that we had received the approval for the office visit scheduled for 6/24 but we would have to start the authorization for surgery once he was seen in the office. She had several questions and concerns about the [facility] approving surgery. Tried to explain that we try to stay on top of them in order to get it approved in time. Once precert has been started we would follow up as much as we can.

## 2019-06-24 ENCOUNTER — OFFICE VISIT (OUTPATIENT)
Dept: CARDIAC SURGERY | Facility: CLINIC | Age: 49
End: 2019-06-24

## 2019-06-24 VITALS
DIASTOLIC BLOOD PRESSURE: 68 MMHG | HEIGHT: 65 IN | WEIGHT: 193.8 LBS | SYSTOLIC BLOOD PRESSURE: 110 MMHG | OXYGEN SATURATION: 98 % | BODY MASS INDEX: 32.29 KG/M2 | HEART RATE: 89 BPM

## 2019-06-24 DIAGNOSIS — F17.200 SMOKER: ICD-10-CM

## 2019-06-24 DIAGNOSIS — I25.10 CORONARY ARTERY DISEASE INVOLVING NATIVE CORONARY ARTERY OF NATIVE HEART WITHOUT ANGINA PECTORIS: Primary | ICD-10-CM

## 2019-06-24 DIAGNOSIS — E66.9 OBESITY (BMI 30-39.9): ICD-10-CM

## 2019-06-24 DIAGNOSIS — I20.0 UNSTABLE ANGINA (HCC): ICD-10-CM

## 2019-06-24 PROCEDURE — 99214 OFFICE O/P EST MOD 30 MIN: CPT | Performed by: THORACIC SURGERY (CARDIOTHORACIC VASCULAR SURGERY)

## 2019-06-25 NOTE — PROGRESS NOTES
Chief Complaint   Patient presents with   • Three vessel     [place] [facility] [place]          Subjective     History of Present Illness    47 yo male with salient history of facial numbness, GERD, KADIE, hypertension, hyperlipidemia, family history of early cardiovascular disease, obesity, tobacco use, and has use of chronic anti-inflammatory meds presents with increasing shortness of breath and fatigue over the last month.  He had intermittent chest pain three days prior to admission.  He did take a ntg sl with resolution.  His chest pain was mid sternal with radiation to the left arm.    He was admitted and further evaluated with ultimately 3 V CAD.  He was admitted with [facility].  He had requested me to perform his surgery.  As I was out of town, he was medically stabilized and was thought safe to see as an outpatient.  He presents today for that evaluation.        Review of Systems   Constitutional: Positive for activity change and fatigue. Negative for appetite change, chills, diaphoresis, fever and unexpected weight change.   HENT: Negative for dental problem, hearing loss, nosebleeds, sore throat, trouble swallowing and voice change.    Eyes: Negative for photophobia, redness and visual disturbance.   Respiratory: Positive for shortness of breath. Negative for apnea, cough, chest tightness, wheezing and stridor.    Cardiovascular: Positive for chest pain. Negative for palpitations and leg swelling.   Gastrointestinal: Negative for abdominal distention, abdominal pain, blood in stool, constipation, diarrhea, nausea and vomiting.   Endocrine: Negative for cold intolerance, heat intolerance, polyphagia and polyuria.   Genitourinary: Negative for decreased urine volume, difficulty urinating, dysuria, flank pain, frequency, hematuria and urgency.   Musculoskeletal: Positive for arthralgias. Negative for back pain, gait problem, joint swelling, myalgias and neck pain.   Skin: Negative for pallor, rash and wound.    Allergic/Immunologic: Negative for immunocompromised state.   Neurological: Negative for dizziness, tremors, seizures, syncope, speech difficulty, weakness, light-headedness, numbness and headaches.   Hematological: Does not bruise/bleed easily.   Psychiatric/Behavioral: Negative for confusion, sleep disturbance and suicidal ideas. The patient is not nervous/anxious.           Past Medical History:   Diagnosis Date   • Acid reflux    • Acid reflux    • Arthritis    • Bell's palsy    • CAD (coronary artery disease)    • CAD (coronary artery disease)    • Hernia, ventral    • Hyperlipidemia    • Hypertension    • Sleep apnea      Past Surgical History:   Procedure Laterality Date   • ADENOIDECTOMY     • CARDIAC CATHETERIZATION N/A 6/14/2019    Procedure: Left Heart Cath;  Surgeon: Satinder Ahumada MD;  Location:  [facility] CATH [facility]AS[facility];  Service: Cardiology   • FOREIGN BODY REMOVAL     • HERNIA REPAIR     • TONSILLECTOMY     • TONSILLECTOMY     • TOTAL KNEE ARTHROPLASTY       Family History   Problem Relation Age of Onset   • Diabetes Mother    • Heart disease Mother      Social History     Tobacco Use   • Smoking status: Current Every Day Smoker     Packs/day: 0.25   • Smokeless tobacco: Current User   Substance Use Topics   • Alcohol use: No   • Drug use: No       (Not in a hospital admission)  Allergies:  Patient has no known allergies.    Objective      Vital Signs  Heart Rate:  [89] 89  BP: (110)/(68) 110/68    Physical Exam   Constitutional: He is oriented to person, place, and time. He appears well-developed.   HENT:   Head: Normocephalic and atraumatic.   Mouth/Throat: Oropharynx is clear and moist.   Eyes: EOM are normal. Pupils are equal, round, and reactive to light.   Neck: Normal range of motion. Neck supple. No JVD present. No tracheal deviation present. No thyromegaly present.   Cardiovascular: Normal rate, regular rhythm, normal heart sounds and intact distal pulses. Exam reveals no gallop and  no friction rub.   No murmur heard.  Pulmonary/Chest: Effort normal and breath sounds normal. No respiratory distress. He has no wheezes. He has no rales. He exhibits no tenderness.   Abdominal: Soft. He exhibits no distension. There is no tenderness.   Musculoskeletal: Normal range of motion. He exhibits no edema.   Lymphadenopathy:     He has no cervical adenopathy.   Neurological: He is alert and oriented to person, place, and time. No cranial nerve deficit.   Skin: Skin is warm and dry.   Psychiatric: He has a normal mood and affect.       Results Review:   Ct Chest With Contrast    Result Date: 6/16/2019  Narrative: EXAMINATION:   CT CHEST W CONTRAST-  6/16/2019 2:51 PM CDT  HISTORY: CT CHEST with contrast 6/16/2019 1:50 PM CDT  HISTORY: Suspect aortic disease  COMPARISON: None  DLP: 402 mGy cm  TECHNIQUE: Serial helical tomographic images of the chest were acquired following the infusion of Isovue contrast intravenously. Bone and soft tissue algorithms were provided.   FINDINGS: Neck base: The imaged portion of the neck and thyroid gland is unremarkable.  Lungs: The lungs are clear without pneumothorax, consolidation, nodules or mass lesion. No pleural effusion is present. The trachea and bronchial tree are patent.  Heart: The heart is normal in size. There is no pericardial effusion the aorta is normal. Coronary calcifications are present.  Great vessels: The great vessels are normal in caliber and are patent. The pulmonary arteries are patent within limits of this study and are normal in caliber.  Lymph nodes: No significant hilar, mediastinal or axillary lymphadenopathy is appreciated.  Skeletal and soft tissues: The osseous structures of the thorax and surrounding soft tissues are unremarkable.  Upper abdomen: The imaged portion of the upper abdomen demonstrates no acute process.      Impression: 1. Negative enhanced CT of the chest.   This report was finalized on 06/16/2019 14:54 by Dr. Wai Lozano  MD.    Xr Chest 1 View    Result Date: 6/13/2019  Narrative: History: 48-year-old chest pain.  Reference: None.  Findings: Frontal chest radiograph performed.  The heart and mediastinum appear normal. The lungs are clear. No pleural fluid or pneumothorax. No acute osseous abnormality.       Impression: No acute cardiopulmonary process. This report was finalized on 06/13/2019 18:10 by Dr Howard Albarran, .    Us Carotid Bilateral    Result Date: 6/17/2019  Narrative: History: Carotid occlusive disease      Impression: Impression: 1. There is less than 50% stenosis of the right internal carotid artery. 2. There is less than 50% stenosis of the left internal carotid artery. 3. Antegrade flow is demonstrated in bilateral vertebral arteries.  Comments: Bilateral carotid vertebral arterial duplex scan was performed.  Grayscale imaging shows intimal thickening and calcified elements at the carotid bifurcation. The right internal carotid artery peak systolic velocity is 51.9 cm/sec. The end-diastolic velocity is 24.0 cm/sec. The right ICA/CCA ratio is approximately 0.59 . These findings correlate with less than 50% stenosis of the right internal carotid artery.  Grayscale imaging shows intimal thickening and calcified elements at the carotid bifurcation. The left internal carotid artery peak systolic velocity is 69.8 cm/sec. The end-diastolic velocity is 33.4 cm/sec. The left ICA/CCA ratio is approximately 0.83 . These findings correlate with less than 50% stenosis of the left internal carotid artery.  Antegrade flow is demonstrated in bilateral vertebral arteries.  This report was finalized on 06/17/2019 14:59 by Dr. Yoni Bah MD.    Date of procedure: 6/14/2019     Procedures performed:     1. Selective coronary angiography  2. Left heart catheterization  3. Left ventriculography  4. Supervision of the administration of moderate sedation     Risk, Benefits, and Alternatives discussed with the patient and/or family.  Plan  is for moderate sedation, and the patient agrees to proceed with the procedure.  An immediate assessment was done prior to the administration of moderate sedation     Indication: unstable angina, high risk stress test  Premedication: Versed, Fentanyl  Contrast: Isovue 370 - 110 ml to patient  Radiation: Flouro time= 4:44. Air Kerma= 750.09 mGy  Catheters: 6Fr JL4, 6Fr JR4, 6Fr angled pigtail     Procedural details:  The patient was brought to the cath lab and prepped and draped in the usual fashion. A Seldinger technique was used to place a 6 Fr sheath in the right common femoral artery. A 6 Fr JL4 catheter was used to engage the left main coronary artery for left system angiography. That was exchanged for a 6 Fr JR4 catheter which was engaged in the ostium of the right coronary artery for right system angiography. 6Fr angled pigtail was used to perform left ventriculogram and assess left heart pressures.  Femoral angiography revealed the arteriotomy suitable for closure, so a 6Fr Perclose was successfully deployed.  Arterial hemostasis was obtained using 6Fr Perclose. There were no obvious complications and the patient was transferred to the [facility] in hemodynamically stable condition.     I supervised the administration of conscious sedation by nursing staff throughout the case.  First dose was given at 1416 and the end of my face-to-face encounter was at 1451, accounting for a total of 35 minutes of supervision.  During the case, continuous pulse oximetry, heart rate, blood pressure, and patient status were monitored.      Findings:     Hemodynamics:  Mz=962/79, LV=160, LVEDP=24, AV grad=negligible     Left ventriculography:  1. The contractility of the left ventricle is abnormal - basal inferior aneurysm.  Estimated ejection fraction 50%.  2. The left ventricle is normal in wall thickness and chamber size.  3. There is no significant mitral regurgitation or aortic insufficiency.     Selective coronary angiography:    Dominance: right     Left Main coronary artery: Moderate size vessel arising normally from the left cusp the bifurcates.  Has mild disease in its mid to distal portions (less than 20%).     Left anterior descending artery: Moderate to large vessel arising normally from the left main that supplies one large septal trunk and a large, branching diagonal prior to 100% occlusion in its mid vessel.  The occluded segment appears to be a less than 20 mm in length, with septal to septal as well as diagonal to LAD collaterals helping fill the mid to distal LAD.  This does appear to be a viable target.     Left circumflex: Small to moderate size vessel arising in a sharp angle from the distal left main that is not dominant for posterior circulation.  Supplies a very small first OM, that is 100% occluded.  The occluded segment is short, with bridging collaterals filling the mid to distal circumflex in antegrade fashion.  This allows visualization of 2 other small to moderately sized obtuse marginal branches.  Since they are under-filled due to proximal occlusion, there size is likely underestimated by angiography and they may be reasonable targets for bypass.     Right coronary artery: Small to moderate sized vessel arising normally from the right cusp that has 100% proximal occlusion.  The distal vessel fills via some right to right bridging collaterals as well as left-to-right collaterals.  There does appear to be a PDA and a posterior lateral branch.     Impression:  1.  Severe, 3 vessel coronary artery disease with 100% chronic, total occlusions of the mid-LAD, proximal circumflex, and proximal right coronary artery.  2.  Preserved left ventricular ejection fraction, EF by left ventriculography appears to be 50%.     Plan:   1.  2 hours bedrest  2.  CT surgery consultation for CABG this admission.  Given the fact that ventricular function is well-preserved and, by left ventriculography, the anterior and lateral walls  demonstrate appropriate wall motion, I would surmise that all territories are viable and worthy of bypass grafts.  3.  Echocardiogram  4.  Continue aspirin 81mg daily indefinitely  5.  Aggressive risk factor modification, including high potency statin, ACE, and BB (if BP and HR will allow).  6.  Tobacco cessation.     Satinder Ahumada MD    TTE:  · Left ventricular systolic function is normal.  · The following left ventricular wall segments are hypokinetic: basal inferolateral and basal inferior.  · Left ventricular diastolic dysfunction (grade I) consistent with impaired relaxation.  · The left ventricular cavity is mildly dilated.     MINIMAL ISCHEMIC LV DYSFUNCTION WITH OVERALL NORMAL LVEF  NO VALVULAR DYSFUNCTION      Patient Hx Of Height, Weight, and Vitals     Height Weight BSA (Calculated - sq m) BMI (kg/m2) Pulse BP       69 136/79   Reason For Exam     ACS   Cardiac History     Diagnosis Date Comment Source   CAD (coronary artery disease)   Provider   CAD (coronary artery disease)   Provider   Hyperlipidemia   Provider   Hypertension   Provider   Study Description     2D, Colorflow, Doppler performed. The study is technically adequate for diagnosis.   Echocardiogram Findings     Left Ventricle Left ventricular systolic function is normal. Estimated EF appears to be in the range of 56 - 60%. Normal left ventricular wall thickness noted. All left ventricular wall segments contract normally. The left ventricular cavity is mildly dilated. Septal wall motion is normal. Left ventricular diastolic dysfunction is noted (grade I) consistent with impaired relaxation.   Right Ventricle Normal right ventricular cavity size, wall thickness, systolic function and septal motion noted.   Left Atrium Normal left atrial size and volume noted.   Right Atrium Normal right atrial size noted.   Aortic Valve The aortic valve is structurally normal. No aortic valve regurgitation is present. No aortic valve stenosis is present.    Mitral Valve The mitral valve is normal in structure. Trace mitral valve regurgitation is present. No significant mitral valve stenosis is present.   Tricuspid Valve The tricuspid valve is normal. No evidence of tricuspid valve stenosis is present. Trace tricuspid valve regurgitation is present. Estimated right ventricular systolic pressure from tricuspid regurgitation is normal (<35 mmHg). No evidence of pulmonary hypertension is present.   Pulmonic Valve The pulmonic valve is structurally normal. There is no significant pulmonic valve stenosis present. There is no pulmonic valve regurgitation present.   Greater Vessels No dilation of the aortic root is present. No dilation of the sinuses of Valsalva is present.   Pericardium There is no evidence of pericardial effusion. There is evidence of a fat pad present.      Wall Scoring     Score Index: 2.00              The following segments are hypokinetic: basal inferior and basal inferolateral.  Other segments could not be evaluated.               LV Measurements     Dimensions   LVIDd 5.3 cm      IVSd 0.89 cm      FS 41.9 %      ESV(cubed) 28.7 ml      EDV(cubed) 146.4 ml      LVOT area 3.8 cm^2      LVOT diam 2.2 cm      Diastolic Filling   MV E max carlos 63.7 cm/sec      MV A max carlos 76.4 cm/sec      MV dec time 0.25 sec      MV E/A 0.83       LA Volume Index 16.4 mL/m2      Med Peak E' Carlos 4.62 cm/sec      Lat Peak E' Carlos 7.2 cm/sec      Avg E/e' ratio 10.78       Shunt Ratio   SV(LVOT) 61.6 ml       Dimensions   LVIDs 3.1 cm      LVPWd 0.73 cm      IVS/LVPW 1.2       LV Sys Vol (BSA corrected) 17.3 ml/m^2      LV [place] Vol (BSA corrected) 38.2 ml/m^2      LV mass(C)d 150.4 grams      EDV(MOD-sp4) 74.1 ml      ESV(MOD-sp4) 33.6 ml      Systolic Function   SV(MOD-sp4) 40.5 ml      SI(MOD-sp4) 20.9 ml/m^2      EF(MOD-sp4) 54.7 %         LA Measurements     LA Dimensions   LA dimension 3.5 cm      LA volume 31.9 cm3      LA Volume Index 16.4 mL/m2         Aortic Valve  Measurements     Stenosis   LVOT diam 2.2 cm      LV V1 max 104.1 cm/sec      LV V1 max PG 4.4 mmHg      LV V1 mean PG 1 mmHg      LV V1 VTI 16.2 cm      Ao pk hakan 128 cm/sec       Stenosis   Ao max PG 6.6 mmHg      Ao mean PG 3.5 mmHg      Ao V2 VTI 23.1 cm      LUCAS(I,D) 2.7 cm^2         Mitral Valve Measurements     Stenosis   MV dec time 0.25 sec       Regurgitation   MR max hakan 208 cm/sec      MR max PG 17.3 mmHg         Tricuspid Valve Measurements     Regurgitation   TR max hakan 185 cm/sec      RVSP(TR) 18.7 mmHg      RAP systole 5 mmHg      PISA   TR max hakan 185 cm/sec          Greater Vessels Measurements     Ao root diam 2.8 cm      Ao root area (BSA corrected) 1.4            History: Carotid occlusive disease     IMPRESSION:  Impression:  1. There is less than 50% stenosis of the right internal carotid artery.  2. There is less than 50% stenosis of the left internal carotid artery.  3. Antegrade flow is demonstrated in bilateral vertebral arteries.     Comments: Bilateral carotid vertebral arterial duplex scan was  performed.     Grayscale imaging shows intimal thickening and calcified elements at the  carotid bifurcation. The right internal carotid artery peak systolic  velocity is 51.9 cm/sec. The end-diastolic velocity is 24.0 cm/sec. The  right ICA/CCA ratio is approximately 0.59 . These findings correlate  with less than 50% stenosis of the right internal carotid artery.     Grayscale imaging shows intimal thickening and calcified elements at the  carotid bifurcation. The left internal carotid artery peak systolic  velocity is 69.8 cm/sec. The end-diastolic velocity is 33.4 cm/sec. The  left ICA/CCA ratio is approximately 0.83 . These findings correlate with  less than 50% stenosis of the left internal carotid artery.     Antegrade flow is demonstrated in bilateral vertebral arteries.     This report was finalized on 06/17/2019 14:59 by Dr. Yoni Bah MD.     I reviewed the patient's new clinical  results.  Discussed with patient and significant other      Assessment/Plan     Coronary artery disease  Unstable angina - medically stabilized  Chronic tobacco use      I discussed the patients findings and my recommendations with patient and significant other.  We discussed the options for treatment of coronary artery disease to include medical therapy, coronary stenting, and surgical revascularization.  We discussed the pros and cons of each option and how it pertains to the current case.  The STS Risk score was calculated using the STS Risk Calculator and discussed with the patient and family.  Coronary artery bypass grafting is best option given patient's findings and risk factors.  We discussed the operative conduct and expected hospital and outpatient recovery.  Risks were discussed to include but not limited to bleeding, infection, stroke, heart attack, need for additional procedures, anesthesia risk, organ dysfunction and/or death, prolonged mechanical ventilation, prolonged ICU stay, chronic pain syndromes, sternal nonunion, and/or death.  We discussed the need to utilize all medical treatments additionally prescribed post surgery.  He is clinically stable with no further bouts of chest at this time with his current medical regimen.  No signs of heart failure. Ok to proceed with elective pathway at this time.  We discussed signs/symptoms of angina and heart failure progression.            The patient and family understands the risks, benefits, and alternatives and he wishes to proceed forward with surgery.    Patient's Body mass index is 32.25 kg/m². BMI is above normal parameters. Recommendations include: educational material, exercise counseling and referral to primary care.    Smoking cessation counseling x 7 minutes.

## 2019-06-26 ENCOUNTER — TELEPHONE (OUTPATIENT)
Dept: CARDIAC SURGERY | Facility: CLINIC | Age: 49
End: 2019-06-26

## 2019-06-26 NOTE — TELEPHONE ENCOUNTER
Pt left vm message stating [facility] and [facility] are going to [facility] in the consult order and pt is wanting to get the prework appt/kahm

## 2019-06-27 ENCOUNTER — APPOINTMENT (OUTPATIENT)
Dept: PREADMISSION TESTING | Facility: HOSPITAL | Age: 49
End: 2019-06-27

## 2019-06-27 ENCOUNTER — ANESTHESIA EVENT (OUTPATIENT)
Dept: PERIOP | Facility: HOSPITAL | Age: 49
End: 2019-06-27

## 2019-06-27 ENCOUNTER — PREP FOR SURGERY (OUTPATIENT)
Dept: OTHER | Facility: HOSPITAL | Age: 49
End: 2019-06-27

## 2019-06-27 VITALS
HEIGHT: 67 IN | BODY MASS INDEX: 30.14 KG/M2 | WEIGHT: 192.02 LBS | OXYGEN SATURATION: 96 % | SYSTOLIC BLOOD PRESSURE: 115 MMHG | DIASTOLIC BLOOD PRESSURE: 75 MMHG | RESPIRATION RATE: 18 BRPM | HEART RATE: 79 BPM

## 2019-06-27 DIAGNOSIS — I25.118 CORONARY ARTERY DISEASE OF NATIVE ARTERY OF NATIVE HEART WITH STABLE ANGINA PECTORIS (HCC): Primary | ICD-10-CM

## 2019-06-27 DIAGNOSIS — I25.118 CORONARY ARTERY DISEASE OF NATIVE ARTERY OF NATIVE HEART WITH STABLE ANGINA PECTORIS (HCC): ICD-10-CM

## 2019-06-27 LAB
ALBUMIN SERPL-MCNC: 4.8 G/DL (ref 3.5–5)
ALBUMIN/GLOB SERPL: 1.7 G/DL (ref 1.1–2.5)
ALP SERPL-CCNC: 84 U/L (ref 24–120)
ALT SERPL W P-5'-P-CCNC: 52 U/L (ref 0–54)
ANION GAP SERPL CALCULATED.3IONS-SCNC: 11 MMOL/L (ref 4–13)
APTT PPP: 29.4 SECONDS (ref 24.1–35)
AST SERPL-CCNC: 36 U/L (ref 7–45)
BASOPHILS # BLD AUTO: 0.14 10*3/MM3 (ref 0–0.2)
BASOPHILS NFR BLD AUTO: 1.3 % (ref 0–2)
BILIRUB SERPL-MCNC: 0.4 MG/DL (ref 0.1–1)
BILIRUB UR QL STRIP: NEGATIVE
BUN BLD-MCNC: 14 MG/DL (ref 5–21)
BUN/CREAT SERPL: 15.6 (ref 7–25)
CALCIUM SPEC-SCNC: 9.8 MG/DL (ref 8.4–10.4)
CHLORIDE SERPL-SCNC: 101 MMOL/L (ref 98–110)
CLARITY UR: CLEAR
CO2 SERPL-SCNC: 27 MMOL/L (ref 24–31)
COLOR UR: YELLOW
CREAT BLD-MCNC: 0.9 MG/DL (ref 0.5–1.4)
DEPRECATED RDW RBC AUTO: 38.4 FL (ref 40–54)
EOSINOPHIL # BLD AUTO: 0.23 10*3/MM3 (ref 0–0.7)
EOSINOPHIL NFR BLD AUTO: 2.2 % (ref 0–4)
ERYTHROCYTE [DISTWIDTH] IN BLOOD BY AUTOMATED COUNT: 12.1 % (ref 12–15)
GFR SERPL CREATININE-BSD FRML MDRD: 89 ML/MIN/1.73
GLOBULIN UR ELPH-MCNC: 2.9 GM/DL
GLUCOSE BLD-MCNC: 105 MG/DL (ref 70–100)
GLUCOSE UR STRIP-MCNC: NEGATIVE MG/DL
HCT VFR BLD AUTO: 45.3 % (ref 40–52)
HGB BLD-MCNC: 16.1 G/DL (ref 14–18)
HGB UR QL STRIP.AUTO: NEGATIVE
IMM GRANULOCYTES # BLD AUTO: 0.03 10*3/MM3 (ref 0–0.05)
IMM GRANULOCYTES NFR BLD AUTO: 0.3 % (ref 0–5)
INR PPP: 0.89 (ref 0.91–1.09)
KETONES UR QL STRIP: NEGATIVE
LEUKOCYTE ESTERASE UR QL STRIP.AUTO: NEGATIVE
LYMPHOCYTES # BLD AUTO: 3.7 10*3/MM3 (ref 0.72–4.86)
LYMPHOCYTES NFR BLD AUTO: 35 % (ref 15–45)
MCH RBC QN AUTO: 30.7 PG (ref 28–32)
MCHC RBC AUTO-ENTMCNC: 35.5 G/DL (ref 33–36)
MCV RBC AUTO: 86.3 FL (ref 82–95)
MONOCYTES # BLD AUTO: 0.8 10*3/MM3 (ref 0.19–1.3)
MONOCYTES NFR BLD AUTO: 7.6 % (ref 4–12)
NEUTROPHILS # BLD AUTO: 5.68 10*3/MM3 (ref 1.87–8.4)
NEUTROPHILS NFR BLD AUTO: 53.6 % (ref 39–78)
NITRITE UR QL STRIP: NEGATIVE
NRBC BLD AUTO-RTO: 0 /100 WBC (ref 0–0.2)
PH UR STRIP.AUTO: 6.5 [PH] (ref 5–8)
PLATELET # BLD AUTO: 364 10*3/MM3 (ref 130–400)
PMV BLD AUTO: 10.3 FL (ref 6–12)
POTASSIUM BLD-SCNC: 4.6 MMOL/L (ref 3.5–5.3)
PROT SERPL-MCNC: 7.7 G/DL (ref 6.3–8.7)
PROT UR QL STRIP: NEGATIVE
PROTHROMBIN TIME: 12.3 SECONDS (ref 11.9–14.6)
RBC # BLD AUTO: 5.25 10*6/MM3 (ref 4.8–5.9)
SODIUM BLD-SCNC: 139 MMOL/L (ref 135–145)
SP GR UR STRIP: <=1.005 (ref 1–1.03)
UROBILINOGEN UR QL STRIP: NORMAL
WBC NRBC COR # BLD: 10.58 10*3/MM3 (ref 4.8–10.8)

## 2019-06-27 PROCEDURE — 85610 PROTHROMBIN TIME: CPT | Performed by: NURSE PRACTITIONER

## 2019-06-27 PROCEDURE — 86905 BLOOD TYPING RBC ANTIGENS: CPT | Performed by: NURSE PRACTITIONER

## 2019-06-27 PROCEDURE — 85730 THROMBOPLASTIN TIME PARTIAL: CPT | Performed by: NURSE PRACTITIONER

## 2019-06-27 PROCEDURE — 86870 RBC ANTIBODY IDENTIFICATION: CPT | Performed by: NURSE PRACTITIONER

## 2019-06-27 PROCEDURE — 93005 ELECTROCARDIOGRAM TRACING: CPT

## 2019-06-27 PROCEDURE — 86902 BLOOD TYPE ANTIGEN DONOR EA: CPT

## 2019-06-27 PROCEDURE — 93010 ELECTROCARDIOGRAM REPORT: CPT | Performed by: INTERNAL MEDICINE

## 2019-06-27 PROCEDURE — 81003 URINALYSIS AUTO W/O SCOPE: CPT | Performed by: NURSE PRACTITIONER

## 2019-06-27 PROCEDURE — 85025 COMPLETE CBC W/AUTO DIFF WBC: CPT | Performed by: NURSE PRACTITIONER

## 2019-06-27 PROCEDURE — 80053 COMPREHEN METABOLIC PANEL: CPT | Performed by: NURSE PRACTITIONER

## 2019-06-27 PROCEDURE — 86901 BLOOD TYPING SEROLOGIC RH(D): CPT | Performed by: NURSE PRACTITIONER

## 2019-06-27 PROCEDURE — 86900 BLOOD TYPING SEROLOGIC ABO: CPT | Performed by: NURSE PRACTITIONER

## 2019-06-27 PROCEDURE — 36415 COLL VENOUS BLD VENIPUNCTURE: CPT

## 2019-06-27 PROCEDURE — 86850 RBC ANTIBODY SCREEN: CPT | Performed by: NURSE PRACTITIONER

## 2019-06-27 RX ORDER — NICOTINE 21 MG/24HR
1 PATCH, TRANSDERMAL 24 HOURS TRANSDERMAL EVERY 24 HOURS
COMMUNITY
End: 2019-08-16

## 2019-06-27 RX ORDER — BUPIVACAINE HCL/0.9 % NACL/PF 0.1 %
2 PLASTIC BAG, INJECTION (ML) EPIDURAL ONCE
Status: CANCELLED | OUTPATIENT
Start: 2019-06-28

## 2019-06-27 RX ORDER — SODIUM CHLORIDE 0.9 % (FLUSH) 0.9 %
3 SYRINGE (ML) INJECTION EVERY 12 HOURS SCHEDULED
Status: CANCELLED | OUTPATIENT
Start: 2019-06-27

## 2019-06-27 RX ORDER — SODIUM CHLORIDE 0.9 % (FLUSH) 0.9 %
3-10 SYRINGE (ML) INJECTION AS NEEDED
Status: CANCELLED | OUTPATIENT
Start: 2019-06-27

## 2019-06-27 NOTE — ANESTHESIA PREPROCEDURE EVALUATION
Anesthesia Evaluation     Patient summary reviewed   no history of anesthetic complications:  NPO Solid Status: > 8 hours  NPO Liquid Status: > 8 hours           Airway   Mallampati: I  TM distance: >3 FB  Neck ROM: full  Dental - normal exam     Pulmonary - normal exam    breath sounds clear to auscultation  (+) a smoker (0.25 ppd) Current Abstained day of surgery, sleep apnea on CPAP,   (-) COPD, asthma, recent URI  Cardiovascular - normal exam  Exercise tolerance: poor (<4 METS)    ECG reviewed  Patient on routine beta blocker and Beta blocker given within 24 hours of surgery  Rhythm: regular  Rate: normal    (+) hypertension, past MI (questioonable, EKG showing previous infarction) , CAD, angina, hyperlipidemia,   (-) pacemaker, cardiac stents, CABG    ROS comment: TTE 63/15/19 - ·Left ventricular systolic function is normal.  ·The following left ventricular wall segments are hypokinetic: basal inferolateral and basal inferior.  ·Left ventricular diastolic dysfunction (grade I) consistent with impaired relaxation.  ·The left ventricular cavity is mildly dilated.     MINIMAL ISCHEMIC LV DYSFUNCTION WITH OVERALL NORMAL LVEF  NO VALVULAR DYSFUNCTION    Cath 6/14/19 - 1.  Severe, 3 vessel coronary artery disease with 100% chronic, total occlusions of the mid-LAD, proximal circumflex, and proximal right coronary artery.  2.  Preserved left ventricular ejection fraction, EF by left ventriculography appears to be 50%.    Neuro/Psych  (-) seizures, TIA, CVA    ROS Comment: Bell's Palsy one year ago  GI/Hepatic/Renal/Endo    (+)  GERD,    (-) liver disease, no renal disease, diabetes, hypothyroidism, hyperthyroidism    Musculoskeletal     Abdominal    Substance History      OB/GYN          Other        ROS/Med Hx Other: Denies dysphagia, previous esophageal surgeries      Phys Exam Other: Radial pulses palpable bilaterally              Anesthesia Plan    ASA 4     general   (Preop arterial line  Post induction central  line  Post induction DOUGLAS (No CI to DOUGLAS placement))  intravenous induction   Anesthetic plan, all risks, benefits, and alternatives have been provided, discussed and informed consent has been obtained with: patient.  Use of blood products discussed with patient  Consented to blood products.

## 2019-06-27 NOTE — TELEPHONE ENCOUNTER
Orders in for CABG tomorrow. Please post and schedule prework today-morning preferably and notify pt.

## 2019-06-28 ENCOUNTER — HOSPITAL ENCOUNTER (INPATIENT)
Facility: HOSPITAL | Age: 49
LOS: 3 days | Discharge: HOME | End: 2019-07-01
Attending: THORACIC SURGERY (CARDIOTHORACIC VASCULAR SURGERY) | Admitting: THORACIC SURGERY (CARDIOTHORACIC VASCULAR SURGERY)

## 2019-06-28 ENCOUNTER — ANESTHESIA (OUTPATIENT)
Dept: PERIOP | Facility: HOSPITAL | Age: 49
End: 2019-06-28

## 2019-06-28 ENCOUNTER — APPOINTMENT (OUTPATIENT)
Dept: GENERAL RADIOLOGY | Facility: HOSPITAL | Age: 49
End: 2019-06-28

## 2019-06-28 ENCOUNTER — APPOINTMENT (OUTPATIENT)
Dept: CARDIOLOGY | Facility: HOSPITAL | Age: 49
End: 2019-06-28

## 2019-06-28 DIAGNOSIS — I25.118 CORONARY ARTERY DISEASE OF NATIVE ARTERY OF NATIVE HEART WITH STABLE ANGINA PECTORIS (HCC): ICD-10-CM

## 2019-06-28 DIAGNOSIS — Z74.09 IMPAIRED MOBILITY: ICD-10-CM

## 2019-06-28 LAB
A-A DO2: ABNORMAL MMHG
ABO GROUP BLD: NORMAL
ALBUMIN SERPL-MCNC: 3 G/DL (ref 3.5–5)
ALBUMIN SERPL-MCNC: 3.3 G/DL (ref 3.5–5)
ANION GAP SERPL CALCULATED.3IONS-SCNC: 6 MMOL/L (ref 4–13)
ANION GAP SERPL CALCULATED.3IONS-SCNC: 6 MMOL/L (ref 4–13)
ANTI-E: NORMAL
APTT PPP: 31.1 SECONDS (ref 24.1–35)
ARTERIAL PATENCY WRIST A: ABNORMAL
ARTERIAL PATENCY WRIST A: NORMAL
ATMOSPHERIC PRESS: 753 MMHG
ATMOSPHERIC PRESS: 754 MMHG
ATMOSPHERIC PRESS: 755 MMHG
BASE EXCESS BLDA CALC-SCNC: -0.2 MMOL/L (ref 0–2)
BASE EXCESS BLDA CALC-SCNC: 0.6 MMOL/L (ref 0–2)
BASE EXCESS BLDA CALC-SCNC: 0.6 MMOL/L (ref 0–2)
BASE EXCESS BLDA CALC-SCNC: 0.8 MMOL/L (ref 0–2)
BASE EXCESS BLDA CALC-SCNC: 1.1 MMOL/L (ref 0–2)
BASE EXCESS BLDA CALC-SCNC: 1.5 MMOL/L (ref 0–2)
BASE EXCESS BLDA CALC-SCNC: 1.6 MMOL/L (ref 0–2)
BASE EXCESS BLDA CALC-SCNC: 1.9 MMOL/L (ref 0–2)
BASE EXCESS BLDA CALC-SCNC: 2.4 MMOL/L (ref 0–2)
BASE EXCESS BLDA CALC-SCNC: 2.7 MMOL/L (ref 0–2)
BASE EXCESS BLDA CALC-SCNC: 4.5 MMOL/L (ref 0–2)
BASOPHILS # BLD AUTO: 0.11 10*3/MM3 (ref 0–0.2)
BASOPHILS NFR BLD AUTO: 0.6 % (ref 0–2)
BDY SITE: ABNORMAL
BDY SITE: NORMAL
BLD GP AB SCN SERPL QL: POSITIVE
BLD GP AB SCN SERPL QL: POSITIVE
BODY TEMPERATURE: 37 C
BUN BLD-MCNC: 12 MG/DL (ref 5–21)
BUN BLD-MCNC: 12 MG/DL (ref 5–21)
BUN/CREAT SERPL: 14.5 (ref 7–25)
BUN/CREAT SERPL: 14.8 (ref 7–25)
CA-I BLD-MCNC: 4.17 MG/DL (ref 4.6–5.4)
CA-I BLD-MCNC: 4.25 MG/DL (ref 4.6–5.4)
CA-I BLD-MCNC: 4.25 MG/DL (ref 4.6–5.4)
CA-I BLD-MCNC: 4.3 MG/DL (ref 4.6–5.4)
CA-I BLD-MCNC: 4.31 MG/DL (ref 4.6–5.4)
CA-I BLD-MCNC: 4.57 MG/DL (ref 4.6–5.4)
CA-I BLD-MCNC: 4.58 MG/DL (ref 4.6–5.4)
CA-I BLD-MCNC: 4.6 MG/DL (ref 4.6–5.4)
CA-I BLD-MCNC: 5.01 MG/DL (ref 4.6–5.4)
CALCIUM SPEC-SCNC: 7.4 MG/DL (ref 8.4–10.4)
CALCIUM SPEC-SCNC: 8.2 MG/DL (ref 8.4–10.4)
CHLORIDE SERPL-SCNC: 108 MMOL/L (ref 98–110)
CHLORIDE SERPL-SCNC: 109 MMOL/L (ref 98–110)
CO2 SERPL-SCNC: 24 MMOL/L (ref 24–31)
CO2 SERPL-SCNC: 26 MMOL/L (ref 24–31)
COHGB MFR BLD: 1 % (ref 0–5)
COHGB MFR BLD: 1.1 % (ref 0–5)
COHGB MFR BLD: 1.2 % (ref 0–5)
COHGB MFR BLD: 1.5 % (ref 0–5)
CREAT BLD-MCNC: 0.81 MG/DL (ref 0.5–1.4)
CREAT BLD-MCNC: 0.83 MG/DL (ref 0.5–1.4)
DEPRECATED RDW RBC AUTO: 39 FL (ref 40–54)
DEPRECATED RDW RBC AUTO: 39.9 FL (ref 40–54)
E AG RBC QL: NEGATIVE
EOSINOPHIL # BLD AUTO: 0.16 10*3/MM3 (ref 0–0.7)
EOSINOPHIL NFR BLD AUTO: 0.8 % (ref 0–4)
ERYTHROCYTE [DISTWIDTH] IN BLOOD BY AUTOMATED COUNT: 12.4 % (ref 12–15)
ERYTHROCYTE [DISTWIDTH] IN BLOOD BY AUTOMATED COUNT: 12.4 % (ref 12–15)
GFR SERPL CREATININE-BSD FRML MDRD: 101 ML/MIN/1.73
GFR SERPL CREATININE-BSD FRML MDRD: 98 ML/MIN/1.73
GLUCOSE BLD-MCNC: 122 MG/DL (ref 70–100)
GLUCOSE BLD-MCNC: 125 MG/DL (ref 70–100)
GLUCOSE BLDC GLUCOMTR-MCNC: 125 MG/DL (ref 70–130)
GLUCOSE BLDC GLUCOMTR-MCNC: 135 MG/DL (ref 70–130)
GLUCOSE BLDC GLUCOMTR-MCNC: 135 MG/DL (ref 70–130)
GLUCOSE BLDC GLUCOMTR-MCNC: 139 MG/DL (ref 70–130)
GLUCOSE BLDC GLUCOMTR-MCNC: 145 MG/DL (ref 70–130)
HCO3 BLDA-SCNC: 25.3 MMOL/L (ref 20–26)
HCO3 BLDA-SCNC: 25.9 MMOL/L (ref 20–26)
HCO3 BLDA-SCNC: 26 MMOL/L (ref 20–26)
HCO3 BLDA-SCNC: 26.2 MMOL/L (ref 20–26)
HCO3 BLDA-SCNC: 26.2 MMOL/L (ref 20–26)
HCO3 BLDA-SCNC: 26.3 MMOL/L (ref 20–26)
HCO3 BLDA-SCNC: 27.2 MMOL/L (ref 20–26)
HCO3 BLDA-SCNC: 27.3 MMOL/L (ref 20–26)
HCO3 BLDA-SCNC: 27.6 MMOL/L (ref 20–26)
HCO3 BLDA-SCNC: 27.6 MMOL/L (ref 20–26)
HCO3 BLDA-SCNC: 29.2 MMOL/L (ref 20–26)
HCT VFR BLD AUTO: 32.9 % (ref 40–52)
HCT VFR BLD AUTO: 34.3 % (ref 40–52)
HCT VFR BLD CALC: 35.5 % (ref 38–51)
HCT VFR BLD CALC: 35.6 % (ref 38–51)
HCT VFR BLD CALC: 35.8 % (ref 38–51)
HCT VFR BLD CALC: 35.9 % (ref 38–51)
HCT VFR BLD CALC: 36.6 % (ref 38–51)
HCT VFR BLD CALC: 37.1 % (ref 38–51)
HCT VFR BLD CALC: 45.6 % (ref 38–51)
HCT VFR BLD CALC: 45.9 % (ref 38–51)
HGB BLD-MCNC: 11.9 G/DL (ref 14–18)
HGB BLD-MCNC: 12.1 G/DL (ref 14–18)
HGB BLDA-MCNC: 11.6 G/DL (ref 14–18)
HGB BLDA-MCNC: 11.6 G/DL (ref 14–18)
HGB BLDA-MCNC: 11.7 G/DL (ref 14–18)
HGB BLDA-MCNC: 11.7 G/DL (ref 14–18)
HGB BLDA-MCNC: 11.9 G/DL (ref 14–18)
HGB BLDA-MCNC: 12.1 G/DL (ref 14–18)
HGB BLDA-MCNC: 14.9 G/DL (ref 14–18)
HGB BLDA-MCNC: 15 G/DL (ref 14–18)
HOROWITZ INDEX BLD+IHG-RTO: 50 %
HOROWITZ INDEX BLD+IHG-RTO: 50 %
HOROWITZ INDEX BLD+IHG-RTO: 60 %
IMM GRANULOCYTES # BLD AUTO: 0.13 10*3/MM3 (ref 0–0.05)
IMM GRANULOCYTES NFR BLD AUTO: 0.7 % (ref 0–5)
INR PPP: 1.19 (ref 0.91–1.09)
LYMPHOCYTES # BLD AUTO: 2.65 10*3/MM3 (ref 0.72–4.86)
LYMPHOCYTES NFR BLD AUTO: 13.4 % (ref 15–45)
Lab: ABNORMAL
Lab: NORMAL
MCH RBC QN AUTO: 30.7 PG (ref 28–32)
MCH RBC QN AUTO: 31.2 PG (ref 28–32)
MCHC RBC AUTO-ENTMCNC: 35.3 G/DL (ref 33–36)
MCHC RBC AUTO-ENTMCNC: 36.2 G/DL (ref 33–36)
MCV RBC AUTO: 86.4 FL (ref 82–95)
MCV RBC AUTO: 87.1 FL (ref 82–95)
METHGB BLD QL: 0.7 % (ref 0–3)
METHGB BLD QL: 0.8 % (ref 0–3)
METHGB BLD QL: 0.9 % (ref 0–3)
METHGB BLD QL: 1 % (ref 0–3)
METHGB BLD QL: 1 % (ref 0–3)
METHGB BLD QL: 1.1 % (ref 0–3)
MODALITY: ABNORMAL
MODALITY: NORMAL
MONOCYTES # BLD AUTO: 1.67 10*3/MM3 (ref 0.19–1.3)
MONOCYTES NFR BLD AUTO: 8.4 % (ref 4–12)
NEUTROPHILS # BLD AUTO: 15.07 10*3/MM3 (ref 1.87–8.4)
NEUTROPHILS NFR BLD AUTO: 76.1 % (ref 39–78)
NOTE: ABNORMAL
NRBC BLD AUTO-RTO: 0 /100 WBC (ref 0–0.2)
OXYHGB MFR BLDV: 96.9 % (ref 94–99)
OXYHGB MFR BLDV: 97.6 % (ref 94–99)
OXYHGB MFR BLDV: 97.7 % (ref 94–99)
OXYHGB MFR BLDV: 98 % (ref 94–99)
OXYHGB MFR BLDV: 98 % (ref 94–99)
OXYHGB MFR BLDV: 98.3 % (ref 94–99)
OXYHGB MFR BLDV: 98.3 % (ref 94–99)
OXYHGB MFR BLDV: 98.5 % (ref 94–99)
PCO2 BLDA: 39 MM HG (ref 35–45)
PCO2 BLDA: 39.3 MM HG (ref 35–45)
PCO2 BLDA: 40.9 MM HG (ref 35–45)
PCO2 BLDA: 42.3 MM HG (ref 35–45)
PCO2 BLDA: 42.9 MM HG (ref 35–45)
PCO2 BLDA: 43.2 MM HG (ref 35–45)
PCO2 BLDA: 44.3 MM HG (ref 35–45)
PCO2 BLDA: 44.9 MM HG (ref 35–45)
PCO2 BLDA: 45.2 MM HG (ref 35–45)
PCO2 BLDA: 47 MM HG (ref 35–45)
PCO2 BLDA: 50 MM HG (ref 35–45)
PCO2 TEMP ADJ BLD: ABNORMAL MM HG (ref 35–45)
PEEP RESPIRATORY: 5 CM[H2O]
PH BLDA: 7.35 PH UNITS (ref 7.35–7.45)
PH BLDA: 7.35 PH UNITS (ref 7.35–7.45)
PH BLDA: 7.37 PH UNITS (ref 7.35–7.45)
PH BLDA: 7.38 PH UNITS (ref 7.35–7.45)
PH BLDA: 7.4 PH UNITS (ref 7.35–7.45)
PH BLDA: 7.42 PH UNITS (ref 7.35–7.45)
PH BLDA: 7.43 PH UNITS (ref 7.35–7.45)
PH BLDA: 7.44 PH UNITS (ref 7.35–7.45)
PH, TEMP CORRECTED: ABNORMAL PH UNITS (ref 7.35–7.45)
PHOSPHATE SERPL-MCNC: 3.6 MG/DL (ref 2.5–4.5)
PHOSPHATE SERPL-MCNC: 3.6 MG/DL (ref 2.5–4.5)
PLATELET # BLD AUTO: 224 10*3/MM3 (ref 130–400)
PLATELET # BLD AUTO: 253 10*3/MM3 (ref 130–400)
PMV BLD AUTO: 10.1 FL (ref 6–12)
PMV BLD AUTO: 10.8 FL (ref 6–12)
PO2 BLDA: 102 MM HG (ref 83–108)
PO2 BLDA: 107 MM HG (ref 83–108)
PO2 BLDA: 110 MM HG (ref 83–108)
PO2 BLDA: 121 MM HG (ref 83–108)
PO2 BLDA: 223 MM HG (ref 83–108)
PO2 BLDA: 458 MM HG (ref 83–108)
PO2 BLDA: 545 MM HG (ref 83–108)
PO2 BLDA: >547 MM HG (ref 83–108)
PO2 TEMP ADJ BLD: ABNORMAL MM HG (ref 83–108)
POTASSIUM BLD-SCNC: 4.2 MMOL/L (ref 3.5–5.3)
POTASSIUM BLD-SCNC: 4.2 MMOL/L (ref 3.5–5.3)
POTASSIUM BLDA-SCNC: 3.8 MMOL/L (ref 3.5–5.2)
POTASSIUM BLDA-SCNC: 4.1 MMOL/L (ref 3.5–5.2)
POTASSIUM BLDA-SCNC: 4.1 MMOL/L (ref 3.5–5.2)
POTASSIUM BLDA-SCNC: 4.4 MMOL/L (ref 3.5–5.2)
POTASSIUM BLDA-SCNC: 5 MMOL/L (ref 3.5–5.2)
POTASSIUM BLDA-SCNC: 5.1 MMOL/L (ref 3.5–5.2)
POTASSIUM BLDA-SCNC: 5.3 MMOL/L (ref 3.5–5.2)
POTASSIUM BLDA-SCNC: 5.4 MMOL/L (ref 3.5–5.2)
PROTHROMBIN TIME: 15.5 SECONDS (ref 11.9–14.6)
PSV: 10 CMH2O
RBC # BLD AUTO: 3.81 10*6/MM3 (ref 4.8–5.9)
RBC # BLD AUTO: 3.94 10*6/MM3 (ref 4.8–5.9)
RH BLD: POSITIVE
SAO2 % BLDCOA: 100.1 % (ref 94–99)
SAO2 % BLDCOA: 98 % (ref 94–99)
SAO2 % BLDCOA: 98.2 % (ref 94–99)
SAO2 % BLDCOA: 98.4 % (ref 94–99)
SAO2 % BLDCOA: 99 % (ref 94–99)
SAO2 % BLDCOA: 99.7 % (ref 94–99)
SAO2 % BLDCOA: >100.1 % (ref 94–99)
SET MECH RESP RATE: 12
SET MECH RESP RATE: 14
SET MECH RESP RATE: 14
SODIUM BLD-SCNC: 139 MMOL/L (ref 135–145)
SODIUM BLD-SCNC: 140 MMOL/L (ref 135–145)
SODIUM BLDA-SCNC: 136 MMOL/L (ref 136–145)
SODIUM BLDA-SCNC: 136 MMOL/L (ref 136–145)
SODIUM BLDA-SCNC: 137 MMOL/L (ref 136–145)
SODIUM BLDA-SCNC: 137 MMOL/L (ref 136–145)
SODIUM BLDA-SCNC: 138 MMOL/L (ref 136–145)
SODIUM BLDA-SCNC: 138 MMOL/L (ref 136–145)
SODIUM BLDA-SCNC: 139 MMOL/L (ref 136–145)
SODIUM BLDA-SCNC: 139 MMOL/L (ref 136–145)
T&S EXPIRATION DATE: NORMAL
VENTILATOR MODE: ABNORMAL
VENTILATOR MODE: NORMAL
VT ON VENT VENT: 700 ML
WBC NRBC COR # BLD: 18.07 10*3/MM3 (ref 4.8–10.8)
WBC NRBC COR # BLD: 19.79 10*3/MM3 (ref 4.8–10.8)

## 2019-06-28 PROCEDURE — 25010000002 MORPHINE PER 10 MG: Performed by: THORACIC SURGERY (CARDIOTHORACIC VASCULAR SURGERY)

## 2019-06-28 PROCEDURE — 82330 ASSAY OF CALCIUM: CPT

## 2019-06-28 PROCEDURE — 33519 CABG ARTERY-VEIN THREE: CPT | Performed by: THORACIC SURGERY (CARDIOTHORACIC VASCULAR SURGERY)

## 2019-06-28 PROCEDURE — 93355 ECHO TRANSESOPHAGEAL (TEE): CPT | Performed by: THORACIC SURGERY (CARDIOTHORACIC VASCULAR SURGERY)

## 2019-06-28 PROCEDURE — 85027 COMPLETE CBC AUTOMATED: CPT | Performed by: THORACIC SURGERY (CARDIOTHORACIC VASCULAR SURGERY)

## 2019-06-28 PROCEDURE — 93312 ECHO TRANSESOPHAGEAL: CPT | Performed by: INTERNAL MEDICINE

## 2019-06-28 PROCEDURE — 82375 ASSAY CARBOXYHB QUANT: CPT

## 2019-06-28 PROCEDURE — 82805 BLOOD GASES W/O2 SATURATION: CPT

## 2019-06-28 PROCEDURE — 94640 AIRWAY INHALATION TREATMENT: CPT

## 2019-06-28 PROCEDURE — 93318 ECHO TRANSESOPHAGEAL INTRAOP: CPT

## 2019-06-28 PROCEDURE — 82803 BLOOD GASES ANY COMBINATION: CPT

## 2019-06-28 PROCEDURE — 94770: CPT

## 2019-06-28 PROCEDURE — 80069 RENAL FUNCTION PANEL: CPT | Performed by: THORACIC SURGERY (CARDIOTHORACIC VASCULAR SURGERY)

## 2019-06-28 PROCEDURE — 25010000002 VANCOMYCIN 1 G RECONSTITUTED SOLUTION 1 EACH VIAL: Performed by: THORACIC SURGERY (CARDIOTHORACIC VASCULAR SURGERY)

## 2019-06-28 PROCEDURE — 71045 X-RAY EXAM CHEST 1 VIEW: CPT

## 2019-06-28 PROCEDURE — 94799 UNLISTED PULMONARY SVC/PX: CPT

## 2019-06-28 PROCEDURE — 93355 ECHO TRANSESOPHAGEAL (TEE): CPT

## 2019-06-28 PROCEDURE — 25010000002 VANCOMYCIN 10 G RECONSTITUTED SOLUTION: Performed by: THORACIC SURGERY (CARDIOTHORACIC VASCULAR SURGERY)

## 2019-06-28 PROCEDURE — 83050 HGB METHEMOGLOBIN QUAN: CPT

## 2019-06-28 PROCEDURE — 85730 THROMBOPLASTIN TIME PARTIAL: CPT | Performed by: THORACIC SURGERY (CARDIOTHORACIC VASCULAR SURGERY)

## 2019-06-28 PROCEDURE — 06BP4ZZ EXCISION OF RIGHT SAPHENOUS VEIN, PERCUTANEOUS ENDOSCOPIC APPROACH: ICD-10-PCS | Performed by: THORACIC SURGERY (CARDIOTHORACIC VASCULAR SURGERY)

## 2019-06-28 PROCEDURE — 33508 ENDOSCOPIC VEIN HARVEST: CPT | Performed by: THORACIC SURGERY (CARDIOTHORACIC VASCULAR SURGERY)

## 2019-06-28 PROCEDURE — 93325 DOPPLER ECHO COLOR FLOW MAPG: CPT | Performed by: INTERNAL MEDICINE

## 2019-06-28 PROCEDURE — 25010000002 HEPARIN (PORCINE) PER 1000 UNITS: Performed by: NURSE ANESTHETIST, CERTIFIED REGISTERED

## 2019-06-28 PROCEDURE — 25010000002 CEFAZOLIN PER 500 MG: Performed by: THORACIC SURGERY (CARDIOTHORACIC VASCULAR SURGERY)

## 2019-06-28 PROCEDURE — 94002 VENT MGMT INPAT INIT DAY: CPT

## 2019-06-28 PROCEDURE — 5A1221Z PERFORMANCE OF CARDIAC OUTPUT, CONTINUOUS: ICD-10-PCS | Performed by: THORACIC SURGERY (CARDIOTHORACIC VASCULAR SURGERY)

## 2019-06-28 PROCEDURE — 25010000002 VANCOMYCIN 1 G RECONSTITUTED SOLUTION: Performed by: NURSE ANESTHETIST, CERTIFIED REGISTERED

## 2019-06-28 PROCEDURE — 25010000002 MIDAZOLAM PER 1 MG: Performed by: NURSE ANESTHETIST, CERTIFIED REGISTERED

## 2019-06-28 PROCEDURE — 85025 COMPLETE CBC W/AUTO DIFF WBC: CPT | Performed by: THORACIC SURGERY (CARDIOTHORACIC VASCULAR SURGERY)

## 2019-06-28 PROCEDURE — 86922 COMPATIBILITY TEST ANTIGLOB: CPT

## 2019-06-28 PROCEDURE — 25010000002 PROPRANOLOL PER 1 MG: Performed by: NURSE ANESTHETIST, CERTIFIED REGISTERED

## 2019-06-28 PROCEDURE — 25010000002 PAPAVERINE PER 60 MG: Performed by: THORACIC SURGERY (CARDIOTHORACIC VASCULAR SURGERY)

## 2019-06-28 PROCEDURE — 02100Z9 BYPASS CORONARY ARTERY, ONE ARTERY FROM LEFT INTERNAL MAMMARY, OPEN APPROACH: ICD-10-PCS | Performed by: THORACIC SURGERY (CARDIOTHORACIC VASCULAR SURGERY)

## 2019-06-28 PROCEDURE — 25010000002 HEPARIN (PORCINE) PER 1000 UNITS: Performed by: THORACIC SURGERY (CARDIOTHORACIC VASCULAR SURGERY)

## 2019-06-28 PROCEDURE — 33533 CABG ARTERIAL SINGLE: CPT | Performed by: THORACIC SURGERY (CARDIOTHORACIC VASCULAR SURGERY)

## 2019-06-28 PROCEDURE — A4648 IMPLANTABLE TISSUE MARKER: HCPCS | Performed by: THORACIC SURGERY (CARDIOTHORACIC VASCULAR SURGERY)

## 2019-06-28 PROCEDURE — 25810000003 DEXTROSE 5 % WITH KCL 20 MEQ 20-5 MEQ/L-% SOLUTION: Performed by: THORACIC SURGERY (CARDIOTHORACIC VASCULAR SURGERY)

## 2019-06-28 PROCEDURE — 25010000002 SUCCINYLCHOLINE PER 20 MG: Performed by: NURSE ANESTHETIST, CERTIFIED REGISTERED

## 2019-06-28 PROCEDURE — 33141 HEART TMR W/OTHER PROCEDURE: CPT | Performed by: THORACIC SURGERY (CARDIOTHORACIC VASCULAR SURGERY)

## 2019-06-28 PROCEDURE — 82962 GLUCOSE BLOOD TEST: CPT

## 2019-06-28 PROCEDURE — 25010000002 AMIODARONE IN DEXTROSE 5% 360-4.14 MG/200ML-% SOLUTION: Performed by: THORACIC SURGERY (CARDIOTHORACIC VASCULAR SURGERY)

## 2019-06-28 PROCEDURE — 25010000002 PHENYLEPHRINE 10 MG/ML SOLUTION 5 ML VIAL: Performed by: THORACIC SURGERY (CARDIOTHORACIC VASCULAR SURGERY)

## 2019-06-28 PROCEDURE — 0212093 BYPASS CORONARY ARTERY, THREE ARTERIES FROM CORONARY ARTERY WITH AUTOLOGOUS VENOUS TISSUE, OPEN APPROACH: ICD-10-PCS | Performed by: THORACIC SURGERY (CARDIOTHORACIC VASCULAR SURGERY)

## 2019-06-28 PROCEDURE — 86920 COMPATIBILITY TEST SPIN: CPT

## 2019-06-28 PROCEDURE — 85610 PROTHROMBIN TIME: CPT | Performed by: THORACIC SURGERY (CARDIOTHORACIC VASCULAR SURGERY)

## 2019-06-28 DEVICE — DISK-SHAPED STYLE, SILICONE (1 PER STERILE PKG)
Type: IMPLANTABLE DEVICE | Site: HEART | Status: FUNCTIONAL
Brand: SCANLAN® RADIOMARK® GRAFT MARKERS

## 2019-06-28 RX ORDER — POTASSIUM CHLORIDE 29.8 MG/ML
20 INJECTION INTRAVENOUS
Status: DISCONTINUED | OUTPATIENT
Start: 2019-06-28 | End: 2019-07-01 | Stop reason: HOSPADM

## 2019-06-28 RX ORDER — PANCURONIUM BROMIDE 1 MG/ML
INJECTION, SOLUTION INTRAVENOUS AS NEEDED
Status: DISCONTINUED | OUTPATIENT
Start: 2019-06-28 | End: 2019-06-28 | Stop reason: SURG

## 2019-06-28 RX ORDER — OXYCODONE HYDROCHLORIDE AND ACETAMINOPHEN 5; 325 MG/1; MG/1
1 TABLET ORAL
Status: DISCONTINUED | OUTPATIENT
Start: 2019-06-28 | End: 2019-07-01 | Stop reason: HOSPADM

## 2019-06-28 RX ORDER — SUCCINYLCHOLINE CHLORIDE 20 MG/ML
INJECTION INTRAMUSCULAR; INTRAVENOUS AS NEEDED
Status: DISCONTINUED | OUTPATIENT
Start: 2019-06-28 | End: 2019-06-28 | Stop reason: SURG

## 2019-06-28 RX ORDER — MORPHINE SULFATE 2 MG/ML
2 INJECTION, SOLUTION INTRAMUSCULAR; INTRAVENOUS
Status: DISCONTINUED | OUTPATIENT
Start: 2019-06-28 | End: 2019-06-29

## 2019-06-28 RX ORDER — SUFENTANIL CITRATE 50 UG/ML
INJECTION EPIDURAL; INTRAVENOUS AS NEEDED
Status: DISCONTINUED | OUTPATIENT
Start: 2019-06-28 | End: 2019-06-28 | Stop reason: SURG

## 2019-06-28 RX ORDER — POTASSIUM CHLORIDE, DEXTROSE MONOHYDRATE 150; 5 MG/100ML; G/100ML
30 INJECTION, SOLUTION INTRAVENOUS CONTINUOUS
Status: DISCONTINUED | OUTPATIENT
Start: 2019-06-28 | End: 2019-06-29

## 2019-06-28 RX ORDER — IPRATROPIUM BROMIDE AND ALBUTEROL SULFATE 2.5; .5 MG/3ML; MG/3ML
3 SOLUTION RESPIRATORY (INHALATION)
Status: DISCONTINUED | OUTPATIENT
Start: 2019-06-28 | End: 2019-06-29

## 2019-06-28 RX ORDER — ROCURONIUM BROMIDE 10 MG/ML
INJECTION, SOLUTION INTRAVENOUS AS NEEDED
Status: DISCONTINUED | OUTPATIENT
Start: 2019-06-28 | End: 2019-06-28 | Stop reason: SURG

## 2019-06-28 RX ORDER — MAGNESIUM HYDROXIDE 1200 MG/15ML
LIQUID ORAL AS NEEDED
Status: DISCONTINUED | OUTPATIENT
Start: 2019-06-28 | End: 2019-06-28 | Stop reason: HOSPADM

## 2019-06-28 RX ORDER — CHLORHEXIDINE GLUCONATE 0.12 MG/ML
15 RINSE ORAL EVERY 12 HOURS
Status: DISCONTINUED | OUTPATIENT
Start: 2019-06-28 | End: 2019-06-30

## 2019-06-28 RX ORDER — SODIUM CHLORIDE, SODIUM LACTATE, POTASSIUM CHLORIDE, CALCIUM CHLORIDE 600; 310; 30; 20 MG/100ML; MG/100ML; MG/100ML; MG/100ML
INJECTION, SOLUTION INTRAVENOUS CONTINUOUS PRN
Status: DISCONTINUED | OUTPATIENT
Start: 2019-06-28 | End: 2019-06-28 | Stop reason: SURG

## 2019-06-28 RX ORDER — OXYCODONE AND ACETAMINOPHEN 10; 325 MG/1; MG/1
1 TABLET ORAL
Status: DISCONTINUED | OUTPATIENT
Start: 2019-06-28 | End: 2019-07-01 | Stop reason: HOSPADM

## 2019-06-28 RX ORDER — ONDANSETRON 2 MG/ML
4 INJECTION INTRAMUSCULAR; INTRAVENOUS EVERY 6 HOURS PRN
Status: DISCONTINUED | OUTPATIENT
Start: 2019-06-28 | End: 2019-07-01 | Stop reason: HOSPADM

## 2019-06-28 RX ORDER — ETOMIDATE 2 MG/ML
INJECTION INTRAVENOUS AS NEEDED
Status: DISCONTINUED | OUTPATIENT
Start: 2019-06-28 | End: 2019-06-28 | Stop reason: SURG

## 2019-06-28 RX ORDER — DEXTROSE MONOHYDRATE 25 G/50ML
25-50 INJECTION, SOLUTION INTRAVENOUS
Status: DISCONTINUED | OUTPATIENT
Start: 2019-06-28 | End: 2019-07-01 | Stop reason: HOSPADM

## 2019-06-28 RX ORDER — AMIODARONE HYDROCHLORIDE 200 MG/1
400 TABLET ORAL EVERY 6 HOURS
Status: DISCONTINUED | OUTPATIENT
Start: 2019-06-29 | End: 2019-06-29

## 2019-06-28 RX ORDER — SODIUM CHLORIDE 9 MG/ML
INJECTION, SOLUTION INTRAVENOUS AS NEEDED
Status: DISCONTINUED | OUTPATIENT
Start: 2019-06-28 | End: 2019-06-28 | Stop reason: HOSPADM

## 2019-06-28 RX ORDER — ASPIRIN 81 MG/1
81 TABLET ORAL DAILY
Status: DISCONTINUED | OUTPATIENT
Start: 2019-06-30 | End: 2019-07-01 | Stop reason: HOSPADM

## 2019-06-28 RX ORDER — ATORVASTATIN CALCIUM 10 MG/1
20 TABLET, FILM COATED ORAL NIGHTLY
Status: DISCONTINUED | OUTPATIENT
Start: 2019-06-29 | End: 2019-07-01 | Stop reason: HOSPADM

## 2019-06-28 RX ORDER — NITROGLYCERIN 20 MG/100ML
5 INJECTION INTRAVENOUS CONTINUOUS
Status: DISCONTINUED | OUTPATIENT
Start: 2019-06-28 | End: 2019-06-29

## 2019-06-28 RX ORDER — BISACODYL 5 MG/1
10 TABLET, DELAYED RELEASE ORAL 2 TIMES DAILY
Status: DISCONTINUED | OUTPATIENT
Start: 2019-06-29 | End: 2019-07-01 | Stop reason: HOSPADM

## 2019-06-28 RX ORDER — MORPHINE SULFATE 2 MG/ML
2 INJECTION, SOLUTION INTRAMUSCULAR; INTRAVENOUS
Status: DISCONTINUED | OUTPATIENT
Start: 2019-06-28 | End: 2019-06-28

## 2019-06-28 RX ORDER — NITROGLYCERIN 20 MG/100ML
INJECTION INTRAVENOUS CONTINUOUS PRN
Status: DISCONTINUED | OUTPATIENT
Start: 2019-06-28 | End: 2019-06-28 | Stop reason: SURG

## 2019-06-28 RX ORDER — AMINOCAPROIC ACID 250 MG/ML
INJECTION, SOLUTION INTRAVENOUS AS NEEDED
Status: DISCONTINUED | OUTPATIENT
Start: 2019-06-28 | End: 2019-06-28 | Stop reason: SURG

## 2019-06-28 RX ORDER — VANCOMYCIN HYDROCHLORIDE 1 G/20ML
INJECTION, POWDER, LYOPHILIZED, FOR SOLUTION INTRAVENOUS AS NEEDED
Status: DISCONTINUED | OUTPATIENT
Start: 2019-06-28 | End: 2019-06-28 | Stop reason: SURG

## 2019-06-28 RX ORDER — CLOPIDOGREL BISULFATE 75 MG/1
75 TABLET ORAL DAILY
Status: DISCONTINUED | OUTPATIENT
Start: 2019-06-29 | End: 2019-07-01 | Stop reason: HOSPADM

## 2019-06-28 RX ORDER — MEPERIDINE HYDROCHLORIDE 50 MG/ML
25 INJECTION INTRAMUSCULAR; INTRAVENOUS; SUBCUTANEOUS
Status: DISCONTINUED | OUTPATIENT
Start: 2019-06-28 | End: 2019-06-29

## 2019-06-28 RX ORDER — MIDAZOLAM HYDROCHLORIDE 1 MG/ML
INJECTION INTRAMUSCULAR; INTRAVENOUS AS NEEDED
Status: DISCONTINUED | OUTPATIENT
Start: 2019-06-28 | End: 2019-06-28 | Stop reason: SURG

## 2019-06-28 RX ORDER — SODIUM CHLORIDE 0.9 % (FLUSH) 0.9 %
3 SYRINGE (ML) INJECTION EVERY 12 HOURS SCHEDULED
Status: DISCONTINUED | OUTPATIENT
Start: 2019-06-28 | End: 2019-06-28

## 2019-06-28 RX ORDER — PROPRANOLOL HYDROCHLORIDE 1 MG/ML
INJECTION, SOLUTION INTRAVENOUS AS NEEDED
Status: DISCONTINUED | OUTPATIENT
Start: 2019-06-28 | End: 2019-06-28 | Stop reason: SURG

## 2019-06-28 RX ORDER — LIDOCAINE HYDROCHLORIDE 20 MG/ML
INJECTION, SOLUTION INFILTRATION; PERINEURAL AS NEEDED
Status: DISCONTINUED | OUTPATIENT
Start: 2019-06-28 | End: 2019-06-28 | Stop reason: SURG

## 2019-06-28 RX ORDER — ASPIRIN 81 MG/1
162 TABLET, CHEWABLE ORAL ONCE
Status: COMPLETED | OUTPATIENT
Start: 2019-06-29 | End: 2019-06-29

## 2019-06-28 RX ORDER — BUPIVACAINE HCL/0.9 % NACL/PF 0.1 %
2 PLASTIC BAG, INJECTION (ML) EPIDURAL EVERY 8 HOURS SCHEDULED
Status: COMPLETED | OUTPATIENT
Start: 2019-06-28 | End: 2019-06-30

## 2019-06-28 RX ORDER — PHENYLEPHRINE HCL IN 0.9% NACL 0.8MG/10ML
SYRINGE (ML) INTRAVENOUS AS NEEDED
Status: DISCONTINUED | OUTPATIENT
Start: 2019-06-28 | End: 2019-06-28 | Stop reason: SURG

## 2019-06-28 RX ORDER — SODIUM CHLORIDE 0.9 % (FLUSH) 0.9 %
3-10 SYRINGE (ML) INJECTION AS NEEDED
Status: DISCONTINUED | OUTPATIENT
Start: 2019-06-28 | End: 2019-06-28

## 2019-06-28 RX ORDER — HEPARIN SODIUM 1000 [USP'U]/ML
INJECTION, SOLUTION INTRAVENOUS; SUBCUTANEOUS AS NEEDED
Status: DISCONTINUED | OUTPATIENT
Start: 2019-06-28 | End: 2019-06-28 | Stop reason: SURG

## 2019-06-28 RX ORDER — BUPIVACAINE HCL/0.9 % NACL/PF 0.1 %
2 PLASTIC BAG, INJECTION (ML) EPIDURAL ONCE
Status: COMPLETED | OUTPATIENT
Start: 2019-06-28 | End: 2019-06-28

## 2019-06-28 RX ORDER — MORPHINE SULFATE 10 MG/ML
10 INJECTION INTRAMUSCULAR; INTRAVENOUS; SUBCUTANEOUS ONCE
Status: COMPLETED | OUTPATIENT
Start: 2019-06-28 | End: 2019-06-28

## 2019-06-28 RX ADMIN — LIDOCAINE HYDROCHLORIDE 100 MG: 20 INJECTION, SOLUTION INFILTRATION; PERINEURAL at 11:29

## 2019-06-28 RX ADMIN — POTASSIUM CHLORIDE AND DEXTROSE MONOHYDRATE 30 ML/HR: 150; 5 INJECTION, SOLUTION INTRAVENOUS at 18:40

## 2019-06-28 RX ADMIN — ETOMIDATE 10 MG: 2 INJECTION, SOLUTION INTRAVENOUS at 11:29

## 2019-06-28 RX ADMIN — Medication 2 G: at 15:25

## 2019-06-28 RX ADMIN — PROPRANOLOL HYDROCHLORIDE 1 MG: 1 INJECTION INTRAVENOUS at 11:45

## 2019-06-28 RX ADMIN — HEPARIN SODIUM 38000 UNITS: 1000 INJECTION, SOLUTION INTRAVENOUS; SUBCUTANEOUS at 12:49

## 2019-06-28 RX ADMIN — SODIUM CHLORIDE 500 ML: 9 INJECTION, SOLUTION INTRAVENOUS at 21:10

## 2019-06-28 RX ADMIN — Medication 160 MCG: at 11:35

## 2019-06-28 RX ADMIN — MIDAZOLAM HYDROCHLORIDE 2 MG: 1 INJECTION, SOLUTION INTRAMUSCULAR; INTRAVENOUS at 11:23

## 2019-06-28 RX ADMIN — SODIUM CHLORIDE, POTASSIUM CHLORIDE, SODIUM LACTATE AND CALCIUM CHLORIDE: 600; 310; 30; 20 INJECTION, SOLUTION INTRAVENOUS at 12:15

## 2019-06-28 RX ADMIN — MIDAZOLAM HYDROCHLORIDE 1 MG: 1 INJECTION, SOLUTION INTRAMUSCULAR; INTRAVENOUS at 11:29

## 2019-06-28 RX ADMIN — SUFENTANIL CITRATE 100 MCG: 50 INJECTION, SOLUTION EPIDURAL; INTRAVENOUS at 14:15

## 2019-06-28 RX ADMIN — Medication 1 APPLICATION: at 21:10

## 2019-06-28 RX ADMIN — AMINOCAPROIC ACID 5 G: 250 INJECTION, SOLUTION INTRAVENOUS at 12:15

## 2019-06-28 RX ADMIN — CHLORHEXIDINE GLUCONATE 15 ML: 1.2 RINSE ORAL at 20:42

## 2019-06-28 RX ADMIN — Medication 40 MCG: at 13:02

## 2019-06-28 RX ADMIN — ROCURONIUM BROMIDE 10 MG: 10 INJECTION INTRAVENOUS at 11:29

## 2019-06-28 RX ADMIN — PANCURONIUM BROMIDE 10 MG: 1 INJECTION, SOLUTION INTRAVENOUS at 11:45

## 2019-06-28 RX ADMIN — SODIUM CHLORIDE 5 MG/HR: 9 INJECTION, SOLUTION INTRAVENOUS at 18:00

## 2019-06-28 RX ADMIN — VANCOMYCIN HYDROCHLORIDE 1.25 G: 1 INJECTION, POWDER, LYOPHILIZED, FOR SOLUTION INTRAVENOUS at 11:45

## 2019-06-28 RX ADMIN — PHENYLEPHRINE HYDROCHLORIDE 0.5 MCG/KG/MIN: 10 INJECTION INTRAVENOUS at 19:04

## 2019-06-28 RX ADMIN — NITROGLYCERIN 5 MCG/MIN: 20 INJECTION INTRAVENOUS at 12:05

## 2019-06-28 RX ADMIN — Medication 160 MCG: at 13:08

## 2019-06-28 RX ADMIN — PANCURONIUM BROMIDE 5 MG: 1 INJECTION, SOLUTION INTRAVENOUS at 15:45

## 2019-06-28 RX ADMIN — AMIODARONE HYDROCHLORIDE 0.5 MG/MIN: 1.8 INJECTION, SOLUTION INTRAVENOUS at 23:41

## 2019-06-28 RX ADMIN — MORPHINE SULFATE 10 MG: 10 INJECTION INTRAVENOUS at 18:00

## 2019-06-28 RX ADMIN — SODIUM CHLORIDE 500 ML: 9 INJECTION, SOLUTION INTRAVENOUS at 20:18

## 2019-06-28 RX ADMIN — POTASSIUM CHLORIDE AND DEXTROSE MONOHYDRATE 30 ML/HR: 150; 5 INJECTION, SOLUTION INTRAVENOUS at 18:28

## 2019-06-28 RX ADMIN — Medication 160 MCG: at 11:40

## 2019-06-28 RX ADMIN — IPRATROPIUM BROMIDE AND ALBUTEROL SULFATE 3 ML: 2.5; .5 SOLUTION RESPIRATORY (INHALATION) at 21:14

## 2019-06-28 RX ADMIN — SODIUM CHLORIDE, POTASSIUM CHLORIDE, SODIUM LACTATE AND CALCIUM CHLORIDE: 600; 310; 30; 20 INJECTION, SOLUTION INTRAVENOUS at 11:14

## 2019-06-28 RX ADMIN — MIDAZOLAM HYDROCHLORIDE 2 MG: 1 INJECTION, SOLUTION INTRAMUSCULAR; INTRAVENOUS at 11:26

## 2019-06-28 RX ADMIN — MEPERIDINE HYDROCHLORIDE 25 MG: 50 INJECTION INTRAMUSCULAR; INTRAVENOUS; SUBCUTANEOUS at 18:51

## 2019-06-28 RX ADMIN — SODIUM CHLORIDE, POTASSIUM CHLORIDE, SODIUM LACTATE AND CALCIUM CHLORIDE: 600; 310; 30; 20 INJECTION, SOLUTION INTRAVENOUS at 11:17

## 2019-06-28 RX ADMIN — CEFAZOLIN SODIUM 2 G: 10 INJECTION, POWDER, FOR SOLUTION INTRAVENOUS at 22:21

## 2019-06-28 RX ADMIN — AMIODARONE HYDROCHLORIDE 1 MG/MIN: 1.8 INJECTION, SOLUTION INTRAVENOUS at 18:27

## 2019-06-28 RX ADMIN — SUFENTANIL CITRATE 100 MCG: 50 INJECTION, SOLUTION EPIDURAL; INTRAVENOUS at 11:29

## 2019-06-28 RX ADMIN — VANCOMYCIN HYDROCHLORIDE 1250 MG: 10 INJECTION, POWDER, LYOPHILIZED, FOR SOLUTION INTRAVENOUS at 21:10

## 2019-06-28 RX ADMIN — Medication 160 MCG: at 11:53

## 2019-06-28 RX ADMIN — Medication 2 G: at 11:15

## 2019-06-28 RX ADMIN — SUFENTANIL CITRATE 100 MCG: 50 INJECTION, SOLUTION EPIDURAL; INTRAVENOUS at 13:13

## 2019-06-28 RX ADMIN — MORPHINE SULFATE 2 MG: 2 INJECTION, SOLUTION INTRAMUSCULAR; INTRAVENOUS at 19:56

## 2019-06-28 RX ADMIN — SUFENTANIL CITRATE 100 MCG: 50 INJECTION, SOLUTION EPIDURAL; INTRAVENOUS at 16:35

## 2019-06-28 RX ADMIN — MIDAZOLAM HYDROCHLORIDE 5 MG: 1 INJECTION, SOLUTION INTRAMUSCULAR; INTRAVENOUS at 15:55

## 2019-06-28 RX ADMIN — HEPARIN SODIUM 3000 UNITS: 1000 INJECTION, SOLUTION INTRAVENOUS; SUBCUTANEOUS at 11:55

## 2019-06-28 RX ADMIN — SUCCINYLCHOLINE CHLORIDE 100 MG: 20 INJECTION, SOLUTION INTRAMUSCULAR; INTRAVENOUS at 11:29

## 2019-06-28 RX ADMIN — NITROGLYCERIN 5 MCG/MIN: 20 INJECTION INTRAVENOUS at 17:30

## 2019-06-28 RX ADMIN — SUFENTANIL CITRATE 100 MCG: 50 INJECTION, SOLUTION EPIDURAL; INTRAVENOUS at 12:30

## 2019-06-28 RX ADMIN — AMINOCAPROIC ACID 1 G/HR: 250 INJECTION, SOLUTION INTRAVENOUS at 12:20

## 2019-06-28 NOTE — ANESTHESIA PROCEDURE NOTES
Peripheral IV    Line placed for Difficult Access.  Performed By   CRNA: Ray Hernandez CRNA  Preanesthetic Checklist  Completed: patient identified, site marked, surgical consent, pre-op evaluation, timeout performed, IV checked, risks and benefits discussed and monitors and equipment checked  Peripheral IV Prep   Prep: alcohol swabs  Peripheral IV Procedure   Laterality:left  Location:  Antecubital  Catheter size: 18 G         Post Assessment   Dressing Type: tape and transparent.    IV Dressing/Site: clean, dry and intact

## 2019-06-28 NOTE — ANESTHESIA PROCEDURE NOTES
Arterial Line      Patient location during procedure: OR   Line placed for hemodynamic monitoring and ABGs/Labs/ISTAT.  Performed By   CRNA: Ray Hernandez CRNA  Preanesthetic Checklist  Completed: patient identified, site marked, surgical consent, pre-op evaluation, timeout performed, IV checked, risks and benefits discussed and monitors and equipment checked  Arterial Line Prep   Sterile Tech: cap and gloves  Prep: alcohol swabs  Patient monitoring: blood pressure monitoring, continuous pulse oximetry and EKG  Arterial Line Procedure   Laterality:left  Location:  radial artery  Catheter size: 20 G   Guidance: ultrasound guided and palpation technique  Number of attempts: 1  Successful placement: yes          Post Assessment   Dressing Type: secured with tape and wrist guard applied.   Complications no  Circ/Move/Sens Assessment: normal.   Patient Tolerance: patient tolerated the procedure well with no apparent complications

## 2019-06-28 NOTE — ANESTHESIA PROCEDURE NOTES
Airway  Urgency: elective    Airway not difficult    General Information and Staff    Patient location during procedure: OR  CRNA: Ray Hernandez CRNA    Indications and Patient Condition  Indications for airway management: airway protection    Preoxygenated: yes  Mask difficulty assessment: 1 - vent by mask    Final Airway Details  Final airway type: endotracheal airway      Successful airway: ETT    Successful intubation technique: direct laryngoscopy  Endotracheal tube insertion site: oral  Blade: Tyra  Blade size: 3.5.  ETT size (mm): 8.0  Cormack-Lehane Classification: grade I - full view of glottis  Placement verified by: chest auscultation and capnometry   Measured from: lips  ETT to lips (cm): 22  Number of attempts at approach: 1

## 2019-06-28 NOTE — ANESTHESIA PROCEDURE NOTES
Peripheral IV    Line placed for Difficult Access.  Performed By   CRNA: Ray Hernandez CRNA  Preanesthetic Checklist  Completed: patient identified, site marked, surgical consent, pre-op evaluation, timeout performed, IV checked, risks and benefits discussed and monitors and equipment checked  Peripheral IV Prep   Patient position: supine   Prep: alcohol swabs  Peripheral IV Procedure   Laterality:left  Location:  Wrist         Post Assessment   Dressing Type: tape and transparent.    IV Dressing/Site: clean, dry and intact

## 2019-06-28 NOTE — ANESTHESIA POSTPROCEDURE EVALUATION
Patient: Irwin Esparza    Procedure Summary     Date:  06/28/19 Room / Location:   [facility] OR  /  [facility] OR    Anesthesia Start:  1114 Anesthesia Stop:  1729    Procedure:  CABG X 4 WITH LIMA, EVH WITH OPEN EXTENSION OF RLE, 57 CHANNEL TMR (N/A Chest) Diagnosis:       Coronary artery disease of native artery of native heart with stable angina pectoris (CMS/HCC)      (Coronary artery disease of native artery of native heart with stable angina pectoris (CMS/HCC) [I25.118])    Surgeon:  Jax Monzon MD Provider:  Ray Hernandez CRNA    Anesthesia Type:  general ASA Status:  4          Anesthesia Type: general  Last vitals  BP   102/62 (06/28/19 1726)   Temp   97 °F (36.1 °C) (06/28/19 0600)   Pulse   80 (06/28/19 1726)   Resp   14 (06/28/19 1726)     SpO2   100 % (06/28/19 1726)     Post Anesthesia Care and Evaluation    Patient location during evaluation: PACU  Patient participation: complete - patient participated  Level of consciousness: awake and alert  Pain management: adequate  Airway patency: patent  Anesthetic complications: No anesthetic complications  PONV Status: none  Cardiovascular status: acceptable and hemodynamically stable  Respiratory status: acceptable  Hydration status: acceptable    Comments: Blood pressure 102/62, pulse 80, temperature 97 °F (36.1 °C), temperature source Temporal, resp. rate 14, SpO2 100 %.    Patient discharged from PACU based upon Josue score. Please see RN notes for further details

## 2019-06-29 ENCOUNTER — APPOINTMENT (OUTPATIENT)
Dept: GENERAL RADIOLOGY | Facility: HOSPITAL | Age: 49
End: 2019-06-29

## 2019-06-29 LAB
ANION GAP SERPL CALCULATED.3IONS-SCNC: 7 MMOL/L (ref 4–13)
ARTERIAL PATENCY WRIST A: ABNORMAL
ATMOSPHERIC PRESS: 753 MMHG
BASE EXCESS BLDA CALC-SCNC: 0.9 MMOL/L (ref 0–2)
BDY SITE: ABNORMAL
BODY TEMPERATURE: 37 C
BUN BLD-MCNC: 11 MG/DL (ref 5–21)
BUN/CREAT SERPL: 15.1 (ref 7–25)
CALCIUM SPEC-SCNC: 7.8 MG/DL (ref 8.4–10.4)
CHLORIDE SERPL-SCNC: 109 MMOL/L (ref 98–110)
CO2 SERPL-SCNC: 24 MMOL/L (ref 24–31)
CREAT BLD-MCNC: 0.73 MG/DL (ref 0.5–1.4)
DEPRECATED RDW RBC AUTO: 40.7 FL (ref 40–54)
ERYTHROCYTE [DISTWIDTH] IN BLOOD BY AUTOMATED COUNT: 12.8 % (ref 12–15)
GFR SERPL CREATININE-BSD FRML MDRD: 114 ML/MIN/1.73
GLUCOSE BLD-MCNC: 141 MG/DL (ref 70–100)
GLUCOSE BLDC GLUCOMTR-MCNC: 140 MG/DL (ref 70–130)
GLUCOSE BLDC GLUCOMTR-MCNC: 142 MG/DL (ref 70–130)
GLUCOSE BLDC GLUCOMTR-MCNC: 144 MG/DL (ref 70–130)
GLUCOSE BLDC GLUCOMTR-MCNC: 147 MG/DL (ref 70–130)
HCO3 BLDA-SCNC: 26.4 MMOL/L (ref 20–26)
HCT VFR BLD AUTO: 35.6 % (ref 40–52)
HGB BLD-MCNC: 12.7 G/DL (ref 14–18)
HOROWITZ INDEX BLD+IHG-RTO: 40 %
INR PPP: 1.02 (ref 0.91–1.09)
Lab: ABNORMAL
MCH RBC QN AUTO: 31.1 PG (ref 28–32)
MCHC RBC AUTO-ENTMCNC: 35.7 G/DL (ref 33–36)
MCV RBC AUTO: 87.3 FL (ref 82–95)
MODALITY: ABNORMAL
PCO2 BLDA: 44.2 MM HG (ref 35–45)
PEEP RESPIRATORY: 5 CM[H2O]
PH BLDA: 7.38 PH UNITS (ref 7.35–7.45)
PLATELET # BLD AUTO: 250 10*3/MM3 (ref 130–400)
PMV BLD AUTO: 10.2 FL (ref 6–12)
PO2 BLDA: 97.3 MM HG (ref 83–108)
POTASSIUM BLD-SCNC: 4.5 MMOL/L (ref 3.5–5.3)
PROTHROMBIN TIME: 13.7 SECONDS (ref 11.9–14.6)
PSV: 10 CMH2O
RBC # BLD AUTO: 4.08 10*6/MM3 (ref 4.8–5.9)
SAO2 % BLDCOA: 97.7 % (ref 94–99)
SET MECH RESP RATE: 12
SODIUM BLD-SCNC: 140 MMOL/L (ref 135–145)
VENTILATOR MODE: ABNORMAL
VT ON VENT VENT: 700 ML
WBC NRBC COR # BLD: 18.48 10*3/MM3 (ref 4.8–10.8)

## 2019-06-29 PROCEDURE — 25010000002 VANCOMYCIN 10 G RECONSTITUTED SOLUTION: Performed by: THORACIC SURGERY (CARDIOTHORACIC VASCULAR SURGERY)

## 2019-06-29 PROCEDURE — 94799 UNLISTED PULMONARY SVC/PX: CPT

## 2019-06-29 PROCEDURE — 25010000002 ONDANSETRON PER 1 MG: Performed by: THORACIC SURGERY (CARDIOTHORACIC VASCULAR SURGERY)

## 2019-06-29 PROCEDURE — 93005 ELECTROCARDIOGRAM TRACING: CPT | Performed by: THORACIC SURGERY (CARDIOTHORACIC VASCULAR SURGERY)

## 2019-06-29 PROCEDURE — 25010000002 CEFAZOLIN PER 500 MG: Performed by: THORACIC SURGERY (CARDIOTHORACIC VASCULAR SURGERY)

## 2019-06-29 PROCEDURE — 94770: CPT

## 2019-06-29 PROCEDURE — 80048 BASIC METABOLIC PNL TOTAL CA: CPT | Performed by: THORACIC SURGERY (CARDIOTHORACIC VASCULAR SURGERY)

## 2019-06-29 PROCEDURE — 82962 GLUCOSE BLOOD TEST: CPT

## 2019-06-29 PROCEDURE — 85027 COMPLETE CBC AUTOMATED: CPT | Performed by: THORACIC SURGERY (CARDIOTHORACIC VASCULAR SURGERY)

## 2019-06-29 PROCEDURE — 94003 VENT MGMT INPAT SUBQ DAY: CPT

## 2019-06-29 PROCEDURE — 94760 N-INVAS EAR/PLS OXIMETRY 1: CPT

## 2019-06-29 PROCEDURE — 71045 X-RAY EXAM CHEST 1 VIEW: CPT

## 2019-06-29 PROCEDURE — 97116 GAIT TRAINING THERAPY: CPT

## 2019-06-29 PROCEDURE — 93010 ELECTROCARDIOGRAM REPORT: CPT | Performed by: INTERNAL MEDICINE

## 2019-06-29 PROCEDURE — 82803 BLOOD GASES ANY COMBINATION: CPT

## 2019-06-29 PROCEDURE — 97162 PT EVAL MOD COMPLEX 30 MIN: CPT | Performed by: PHYSICAL THERAPIST

## 2019-06-29 PROCEDURE — 85610 PROTHROMBIN TIME: CPT | Performed by: THORACIC SURGERY (CARDIOTHORACIC VASCULAR SURGERY)

## 2019-06-29 PROCEDURE — 25010000002 FUROSEMIDE PER 20 MG: Performed by: THORACIC SURGERY (CARDIOTHORACIC VASCULAR SURGERY)

## 2019-06-29 PROCEDURE — 99024 POSTOP FOLLOW-UP VISIT: CPT | Performed by: THORACIC SURGERY (CARDIOTHORACIC VASCULAR SURGERY)

## 2019-06-29 PROCEDURE — 25010000002 ENOXAPARIN PER 10 MG: Performed by: THORACIC SURGERY (CARDIOTHORACIC VASCULAR SURGERY)

## 2019-06-29 PROCEDURE — 25010000002 AMIODARONE IN DEXTROSE 5% 360-4.14 MG/200ML-% SOLUTION: Performed by: THORACIC SURGERY (CARDIOTHORACIC VASCULAR SURGERY)

## 2019-06-29 PROCEDURE — 25010000002 PHENYLEPHRINE 10 MG/ML SOLUTION 5 ML VIAL: Performed by: THORACIC SURGERY (CARDIOTHORACIC VASCULAR SURGERY)

## 2019-06-29 RX ORDER — AMIODARONE HYDROCHLORIDE 200 MG/1
400 TABLET ORAL EVERY 8 HOURS SCHEDULED
Status: DISCONTINUED | OUTPATIENT
Start: 2019-06-29 | End: 2019-06-30

## 2019-06-29 RX ORDER — LISINOPRIL 2.5 MG/1
2.5 TABLET ORAL
Status: DISCONTINUED | OUTPATIENT
Start: 2019-06-29 | End: 2019-07-01 | Stop reason: HOSPADM

## 2019-06-29 RX ORDER — POLYETHYLENE GLYCOL 3350 17 G/17G
17 POWDER, FOR SOLUTION ORAL DAILY
Status: DISCONTINUED | OUTPATIENT
Start: 2019-06-29 | End: 2019-07-01 | Stop reason: HOSPADM

## 2019-06-29 RX ORDER — IPRATROPIUM BROMIDE AND ALBUTEROL SULFATE 2.5; .5 MG/3ML; MG/3ML
3 SOLUTION RESPIRATORY (INHALATION)
Status: DISCONTINUED | OUTPATIENT
Start: 2019-06-29 | End: 2019-06-30

## 2019-06-29 RX ORDER — FUROSEMIDE 10 MG/ML
20 INJECTION INTRAMUSCULAR; INTRAVENOUS
Status: DISCONTINUED | OUTPATIENT
Start: 2019-06-29 | End: 2019-07-01 | Stop reason: HOSPADM

## 2019-06-29 RX ORDER — POTASSIUM CHLORIDE 1.5 G/1.77G
20 POWDER, FOR SOLUTION ORAL 2 TIMES DAILY
Status: DISCONTINUED | OUTPATIENT
Start: 2019-06-29 | End: 2019-06-30

## 2019-06-29 RX ADMIN — FUROSEMIDE 20 MG: 10 INJECTION, SOLUTION INTRAVENOUS at 17:45

## 2019-06-29 RX ADMIN — METOPROLOL TARTRATE 12.5 MG: 25 TABLET, FILM COATED ORAL at 08:16

## 2019-06-29 RX ADMIN — IPRATROPIUM BROMIDE AND ALBUTEROL SULFATE 3 ML: 2.5; .5 SOLUTION RESPIRATORY (INHALATION) at 07:18

## 2019-06-29 RX ADMIN — CHLORHEXIDINE GLUCONATE 15 ML: 1.2 RINSE ORAL at 08:16

## 2019-06-29 RX ADMIN — AMIODARONE HYDROCHLORIDE 400 MG: 200 TABLET ORAL at 11:12

## 2019-06-29 RX ADMIN — BISACODYL 10 MG: 5 TABLET ORAL at 20:02

## 2019-06-29 RX ADMIN — ENOXAPARIN SODIUM 30 MG: 30 INJECTION SUBCUTANEOUS at 20:02

## 2019-06-29 RX ADMIN — OXYCODONE HYDROCHLORIDE AND ACETAMINOPHEN 1 TABLET: 10; 325 TABLET ORAL at 23:55

## 2019-06-29 RX ADMIN — ATORVASTATIN CALCIUM 20 MG: 10 TABLET, FILM COATED ORAL at 20:02

## 2019-06-29 RX ADMIN — CEFAZOLIN SODIUM 2 G: 10 INJECTION, POWDER, FOR SOLUTION INTRAVENOUS at 13:55

## 2019-06-29 RX ADMIN — POLYETHYLENE GLYCOL 3350 17 G: 17 POWDER, FOR SOLUTION ORAL at 17:45

## 2019-06-29 RX ADMIN — OXYCODONE HYDROCHLORIDE AND ACETAMINOPHEN 1 TABLET: 10; 325 TABLET ORAL at 17:45

## 2019-06-29 RX ADMIN — AMIODARONE HYDROCHLORIDE 400 MG: 200 TABLET ORAL at 22:02

## 2019-06-29 RX ADMIN — ONDANSETRON HYDROCHLORIDE 4 MG: 2 SOLUTION INTRAMUSCULAR; INTRAVENOUS at 05:50

## 2019-06-29 RX ADMIN — IPRATROPIUM BROMIDE AND ALBUTEROL SULFATE 3 ML: 2.5; .5 SOLUTION RESPIRATORY (INHALATION) at 21:36

## 2019-06-29 RX ADMIN — METOPROLOL TARTRATE 25 MG: 25 TABLET, FILM COATED ORAL at 20:02

## 2019-06-29 RX ADMIN — OXYCODONE HYDROCHLORIDE AND ACETAMINOPHEN 1 TABLET: 10; 325 TABLET ORAL at 13:59

## 2019-06-29 RX ADMIN — CEFAZOLIN SODIUM 2 G: 10 INJECTION, POWDER, FOR SOLUTION INTRAVENOUS at 05:09

## 2019-06-29 RX ADMIN — POTASSIUM CHLORIDE 20 MEQ: 1.5 POWDER, FOR SOLUTION ORAL at 20:02

## 2019-06-29 RX ADMIN — OXYCODONE HYDROCHLORIDE AND ACETAMINOPHEN 1 TABLET: 10; 325 TABLET ORAL at 20:50

## 2019-06-29 RX ADMIN — Medication 1 APPLICATION: at 09:45

## 2019-06-29 RX ADMIN — Medication 1 APPLICATION: at 20:02

## 2019-06-29 RX ADMIN — ASPIRIN 162 MG: 81 TABLET, CHEWABLE ORAL at 08:17

## 2019-06-29 RX ADMIN — ENOXAPARIN SODIUM 30 MG: 30 INJECTION SUBCUTANEOUS at 08:16

## 2019-06-29 RX ADMIN — PHENYLEPHRINE HYDROCHLORIDE 0.5 MCG/KG/MIN: 10 INJECTION INTRAVENOUS at 04:04

## 2019-06-29 RX ADMIN — IPRATROPIUM BROMIDE AND ALBUTEROL SULFATE 3 ML: 2.5; .5 SOLUTION RESPIRATORY (INHALATION) at 11:30

## 2019-06-29 RX ADMIN — CHLORHEXIDINE GLUCONATE 15 ML: 1.2 RINSE ORAL at 20:02

## 2019-06-29 RX ADMIN — CLOPIDOGREL 75 MG: 75 TABLET, FILM COATED ORAL at 17:45

## 2019-06-29 RX ADMIN — BISACODYL 10 MG: 5 TABLET ORAL at 08:16

## 2019-06-29 RX ADMIN — CEFAZOLIN SODIUM 2 G: 10 INJECTION, POWDER, FOR SOLUTION INTRAVENOUS at 21:57

## 2019-06-29 RX ADMIN — LISINOPRIL 2.5 MG: 2.5 TABLET ORAL at 17:45

## 2019-06-29 RX ADMIN — VANCOMYCIN HYDROCHLORIDE 1250 MG: 10 INJECTION, POWDER, LYOPHILIZED, FOR SOLUTION INTRAVENOUS at 08:15

## 2019-06-29 RX ADMIN — OXYCODONE HYDROCHLORIDE AND ACETAMINOPHEN 1 TABLET: 10; 325 TABLET ORAL at 09:45

## 2019-06-29 RX ADMIN — OXYCODONE HYDROCHLORIDE AND ACETAMINOPHEN 1 TABLET: 5; 325 TABLET ORAL at 06:51

## 2019-06-29 RX ADMIN — VANCOMYCIN HYDROCHLORIDE 1250 MG: 10 INJECTION, POWDER, LYOPHILIZED, FOR SOLUTION INTRAVENOUS at 20:02

## 2019-06-30 ENCOUNTER — APPOINTMENT (OUTPATIENT)
Dept: GENERAL RADIOLOGY | Facility: HOSPITAL | Age: 49
End: 2019-06-30

## 2019-06-30 LAB
ANION GAP SERPL CALCULATED.3IONS-SCNC: 7 MMOL/L (ref 4–13)
BUN BLD-MCNC: 9 MG/DL (ref 5–21)
BUN/CREAT SERPL: 11.4 (ref 7–25)
CALCIUM SPEC-SCNC: 8 MG/DL (ref 8.4–10.4)
CHLORIDE SERPL-SCNC: 101 MMOL/L (ref 98–110)
CO2 SERPL-SCNC: 29 MMOL/L (ref 24–31)
CREAT BLD-MCNC: 0.79 MG/DL (ref 0.5–1.4)
DEPRECATED RDW RBC AUTO: 41.9 FL (ref 40–54)
ERYTHROCYTE [DISTWIDTH] IN BLOOD BY AUTOMATED COUNT: 12.7 % (ref 12–15)
GFR SERPL CREATININE-BSD FRML MDRD: 104 ML/MIN/1.73
GLUCOSE BLD-MCNC: 133 MG/DL (ref 70–100)
HCT VFR BLD AUTO: 31.3 % (ref 40–52)
HGB BLD-MCNC: 10.6 G/DL (ref 14–18)
MCH RBC QN AUTO: 30.7 PG (ref 28–32)
MCHC RBC AUTO-ENTMCNC: 33.9 G/DL (ref 33–36)
MCV RBC AUTO: 90.7 FL (ref 82–95)
PLATELET # BLD AUTO: 181 10*3/MM3 (ref 130–400)
PMV BLD AUTO: 10.5 FL (ref 6–12)
POTASSIUM BLD-SCNC: 4.2 MMOL/L (ref 3.5–5.3)
RBC # BLD AUTO: 3.45 10*6/MM3 (ref 4.8–5.9)
SODIUM BLD-SCNC: 137 MMOL/L (ref 135–145)
WBC NRBC COR # BLD: 14.12 10*3/MM3 (ref 4.8–10.8)

## 2019-06-30 PROCEDURE — 25010000002 CEFAZOLIN PER 500 MG: Performed by: THORACIC SURGERY (CARDIOTHORACIC VASCULAR SURGERY)

## 2019-06-30 PROCEDURE — 99024 POSTOP FOLLOW-UP VISIT: CPT | Performed by: THORACIC SURGERY (CARDIOTHORACIC VASCULAR SURGERY)

## 2019-06-30 PROCEDURE — 85027 COMPLETE CBC AUTOMATED: CPT | Performed by: THORACIC SURGERY (CARDIOTHORACIC VASCULAR SURGERY)

## 2019-06-30 PROCEDURE — 94799 UNLISTED PULMONARY SVC/PX: CPT

## 2019-06-30 PROCEDURE — 25010000002 ENOXAPARIN PER 10 MG: Performed by: THORACIC SURGERY (CARDIOTHORACIC VASCULAR SURGERY)

## 2019-06-30 PROCEDURE — 93010 ELECTROCARDIOGRAM REPORT: CPT | Performed by: INTERNAL MEDICINE

## 2019-06-30 PROCEDURE — 71045 X-RAY EXAM CHEST 1 VIEW: CPT

## 2019-06-30 PROCEDURE — 93005 ELECTROCARDIOGRAM TRACING: CPT | Performed by: THORACIC SURGERY (CARDIOTHORACIC VASCULAR SURGERY)

## 2019-06-30 PROCEDURE — 94760 N-INVAS EAR/PLS OXIMETRY 1: CPT

## 2019-06-30 PROCEDURE — 25010000002 FUROSEMIDE PER 20 MG: Performed by: THORACIC SURGERY (CARDIOTHORACIC VASCULAR SURGERY)

## 2019-06-30 PROCEDURE — 80048 BASIC METABOLIC PNL TOTAL CA: CPT | Performed by: THORACIC SURGERY (CARDIOTHORACIC VASCULAR SURGERY)

## 2019-06-30 RX ORDER — METOPROLOL TARTRATE 50 MG/1
50 TABLET, FILM COATED ORAL EVERY 12 HOURS SCHEDULED
Status: DISCONTINUED | OUTPATIENT
Start: 2019-07-01 | End: 2019-07-01 | Stop reason: HOSPADM

## 2019-06-30 RX ORDER — IPRATROPIUM BROMIDE AND ALBUTEROL SULFATE 2.5; .5 MG/3ML; MG/3ML
3 SOLUTION RESPIRATORY (INHALATION)
Status: DISCONTINUED | OUTPATIENT
Start: 2019-06-30 | End: 2019-07-01 | Stop reason: HOSPADM

## 2019-06-30 RX ORDER — POTASSIUM CHLORIDE 750 MG/1
20 CAPSULE, EXTENDED RELEASE ORAL 2 TIMES DAILY WITH MEALS
Status: DISCONTINUED | OUTPATIENT
Start: 2019-06-30 | End: 2019-07-01 | Stop reason: HOSPADM

## 2019-06-30 RX ORDER — AMIODARONE HYDROCHLORIDE 200 MG/1
400 TABLET ORAL EVERY 12 HOURS SCHEDULED
Status: DISCONTINUED | OUTPATIENT
Start: 2019-07-01 | End: 2019-07-01 | Stop reason: HOSPADM

## 2019-06-30 RX ADMIN — POTASSIUM CHLORIDE 20 MEQ: 750 CAPSULE, EXTENDED RELEASE ORAL at 17:58

## 2019-06-30 RX ADMIN — ENOXAPARIN SODIUM 30 MG: 30 INJECTION SUBCUTANEOUS at 09:28

## 2019-06-30 RX ADMIN — LISINOPRIL 2.5 MG: 2.5 TABLET ORAL at 09:28

## 2019-06-30 RX ADMIN — CEFAZOLIN SODIUM 2 G: 10 INJECTION, POWDER, FOR SOLUTION INTRAVENOUS at 05:27

## 2019-06-30 RX ADMIN — METOPROLOL TARTRATE 25 MG: 25 TABLET, FILM COATED ORAL at 09:28

## 2019-06-30 RX ADMIN — BISACODYL 10 MG: 5 TABLET ORAL at 21:28

## 2019-06-30 RX ADMIN — POTASSIUM CHLORIDE 20 MEQ: 1.5 POWDER, FOR SOLUTION ORAL at 09:28

## 2019-06-30 RX ADMIN — ATORVASTATIN CALCIUM 20 MG: 10 TABLET, FILM COATED ORAL at 21:28

## 2019-06-30 RX ADMIN — AMIODARONE HYDROCHLORIDE 400 MG: 200 TABLET ORAL at 13:34

## 2019-06-30 RX ADMIN — FUROSEMIDE 20 MG: 10 INJECTION, SOLUTION INTRAVENOUS at 09:28

## 2019-06-30 RX ADMIN — OXYCODONE HYDROCHLORIDE AND ACETAMINOPHEN 1 TABLET: 10; 325 TABLET ORAL at 19:27

## 2019-06-30 RX ADMIN — METOPROLOL TARTRATE 37.5 MG: 25 TABLET, FILM COATED ORAL at 21:28

## 2019-06-30 RX ADMIN — OXYCODONE HYDROCHLORIDE AND ACETAMINOPHEN 1 TABLET: 10; 325 TABLET ORAL at 02:54

## 2019-06-30 RX ADMIN — IPRATROPIUM BROMIDE AND ALBUTEROL SULFATE 3 ML: 2.5; .5 SOLUTION RESPIRATORY (INHALATION) at 07:08

## 2019-06-30 RX ADMIN — AMIODARONE HYDROCHLORIDE 400 MG: 200 TABLET ORAL at 21:27

## 2019-06-30 RX ADMIN — Medication 1 APPLICATION: at 09:31

## 2019-06-30 RX ADMIN — ASPIRIN 81 MG: 81 TABLET ORAL at 09:28

## 2019-06-30 RX ADMIN — POTASSIUM CHLORIDE 20 MEQ: 750 CAPSULE, EXTENDED RELEASE ORAL at 12:23

## 2019-06-30 RX ADMIN — POLYETHYLENE GLYCOL 3350 17 G: 17 POWDER, FOR SOLUTION ORAL at 09:28

## 2019-06-30 RX ADMIN — IPRATROPIUM BROMIDE AND ALBUTEROL SULFATE 3 ML: 2.5; .5 SOLUTION RESPIRATORY (INHALATION) at 22:05

## 2019-06-30 RX ADMIN — BISACODYL 10 MG: 5 TABLET ORAL at 09:28

## 2019-06-30 RX ADMIN — FUROSEMIDE 20 MG: 10 INJECTION, SOLUTION INTRAVENOUS at 16:37

## 2019-06-30 RX ADMIN — CLOPIDOGREL 75 MG: 75 TABLET, FILM COATED ORAL at 17:58

## 2019-06-30 RX ADMIN — CHLORHEXIDINE GLUCONATE 15 ML: 1.2 RINSE ORAL at 09:28

## 2019-06-30 RX ADMIN — OXYCODONE HYDROCHLORIDE AND ACETAMINOPHEN 1 TABLET: 10; 325 TABLET ORAL at 16:24

## 2019-06-30 RX ADMIN — ENOXAPARIN SODIUM 30 MG: 30 INJECTION SUBCUTANEOUS at 21:30

## 2019-06-30 RX ADMIN — OXYCODONE HYDROCHLORIDE AND ACETAMINOPHEN 1 TABLET: 10; 325 TABLET ORAL at 06:06

## 2019-06-30 RX ADMIN — AMIODARONE HYDROCHLORIDE 400 MG: 200 TABLET ORAL at 06:06

## 2019-06-30 RX ADMIN — OXYCODONE HYDROCHLORIDE AND ACETAMINOPHEN 1 TABLET: 10; 325 TABLET ORAL at 22:22

## 2019-06-30 RX ADMIN — OXYCODONE HYDROCHLORIDE AND ACETAMINOPHEN 1 TABLET: 10; 325 TABLET ORAL at 12:23

## 2019-06-30 RX ADMIN — OXYCODONE HYDROCHLORIDE AND ACETAMINOPHEN 1 TABLET: 10; 325 TABLET ORAL at 09:31

## 2019-07-01 ENCOUNTER — APPOINTMENT (OUTPATIENT)
Dept: GENERAL RADIOLOGY | Facility: HOSPITAL | Age: 49
End: 2019-07-01

## 2019-07-01 VITALS
RESPIRATION RATE: 18 BRPM | TEMPERATURE: 98.6 F | SYSTOLIC BLOOD PRESSURE: 104 MMHG | WEIGHT: 197.25 LBS | OXYGEN SATURATION: 97 % | DIASTOLIC BLOOD PRESSURE: 67 MMHG | BODY MASS INDEX: 30.96 KG/M2 | HEART RATE: 71 BPM | HEIGHT: 67 IN

## 2019-07-01 DIAGNOSIS — Z95.1 S/P CABG X 4: Primary | ICD-10-CM

## 2019-07-01 PROBLEM — Z72.0 TOBACCO USE: Status: ACTIVE | Noted: 2019-07-01

## 2019-07-01 LAB
ABO + RH BLD: NORMAL
ABO + RH BLD: NORMAL
ANION GAP SERPL CALCULATED.3IONS-SCNC: 8 MMOL/L (ref 4–13)
BH BB BLOOD EXPIRATION DATE: NORMAL
BH BB BLOOD EXPIRATION DATE: NORMAL
BH BB BLOOD TYPE BARCODE: 5100
BH BB BLOOD TYPE BARCODE: 5100
BH BB DISPENSE STATUS: NORMAL
BH BB DISPENSE STATUS: NORMAL
BH BB PRODUCT CODE: NORMAL
BH BB PRODUCT CODE: NORMAL
BH BB UNIT NUMBER: NORMAL
BH BB UNIT NUMBER: NORMAL
BUN BLD-MCNC: 10 MG/DL (ref 5–21)
BUN/CREAT SERPL: 12.3 (ref 7–25)
CALCIUM SPEC-SCNC: 8.1 MG/DL (ref 8.4–10.4)
CHLORIDE SERPL-SCNC: 101 MMOL/L (ref 98–110)
CO2 SERPL-SCNC: 29 MMOL/L (ref 24–31)
CREAT BLD-MCNC: 0.81 MG/DL (ref 0.5–1.4)
CROSSMATCH INTERPRETATION: NORMAL
CROSSMATCH INTERPRETATION: NORMAL
DEPRECATED RDW RBC AUTO: 40.9 FL (ref 40–54)
ERYTHROCYTE [DISTWIDTH] IN BLOOD BY AUTOMATED COUNT: 12.6 % (ref 12–15)
GFR SERPL CREATININE-BSD FRML MDRD: 101 ML/MIN/1.73
GLUCOSE BLD-MCNC: 105 MG/DL (ref 70–100)
HCT VFR BLD AUTO: 28.7 % (ref 40–52)
HGB BLD-MCNC: 9.9 G/DL (ref 14–18)
MCH RBC QN AUTO: 30.8 PG (ref 28–32)
MCHC RBC AUTO-ENTMCNC: 34.5 G/DL (ref 33–36)
MCV RBC AUTO: 89.4 FL (ref 82–95)
PLATELET # BLD AUTO: 221 10*3/MM3 (ref 130–400)
PMV BLD AUTO: 10.8 FL (ref 6–12)
POTASSIUM BLD-SCNC: 4.5 MMOL/L (ref 3.5–5.3)
RBC # BLD AUTO: 3.21 10*6/MM3 (ref 4.8–5.9)
SODIUM BLD-SCNC: 138 MMOL/L (ref 135–145)
UNIT  ABO: NORMAL
UNIT  ABO: NORMAL
UNIT  RH: NORMAL
UNIT  RH: NORMAL
WBC NRBC COR # BLD: 10.65 10*3/MM3 (ref 4.8–10.8)

## 2019-07-01 PROCEDURE — 94760 N-INVAS EAR/PLS OXIMETRY 1: CPT

## 2019-07-01 PROCEDURE — 99024 POSTOP FOLLOW-UP VISIT: CPT | Performed by: THORACIC SURGERY (CARDIOTHORACIC VASCULAR SURGERY)

## 2019-07-01 PROCEDURE — 94799 UNLISTED PULMONARY SVC/PX: CPT

## 2019-07-01 PROCEDURE — 71046 X-RAY EXAM CHEST 2 VIEWS: CPT

## 2019-07-01 PROCEDURE — 85027 COMPLETE CBC AUTOMATED: CPT | Performed by: THORACIC SURGERY (CARDIOTHORACIC VASCULAR SURGERY)

## 2019-07-01 PROCEDURE — 80048 BASIC METABOLIC PNL TOTAL CA: CPT | Performed by: THORACIC SURGERY (CARDIOTHORACIC VASCULAR SURGERY)

## 2019-07-01 PROCEDURE — 86902 BLOOD TYPE ANTIGEN DONOR EA: CPT

## 2019-07-01 PROCEDURE — 25010000002 FUROSEMIDE PER 20 MG: Performed by: THORACIC SURGERY (CARDIOTHORACIC VASCULAR SURGERY)

## 2019-07-01 PROCEDURE — 25010000002 ENOXAPARIN PER 10 MG: Performed by: THORACIC SURGERY (CARDIOTHORACIC VASCULAR SURGERY)

## 2019-07-01 RX ORDER — CLOPIDOGREL BISULFATE 75 MG/1
75 TABLET ORAL DAILY
Qty: 30 TABLET | Refills: 2 | Status: SHIPPED | OUTPATIENT
Start: 2019-07-01 | End: 2019-09-18 | Stop reason: SDUPTHER

## 2019-07-01 RX ORDER — LISINOPRIL 2.5 MG/1
2.5 TABLET ORAL DAILY
Qty: 30 TABLET | Refills: 2 | Status: SHIPPED | OUTPATIENT
Start: 2019-07-01 | End: 2019-08-07 | Stop reason: SDUPTHER

## 2019-07-01 RX ORDER — BISACODYL 10 MG
10 SUPPOSITORY, RECTAL RECTAL DAILY
Status: DISCONTINUED | OUTPATIENT
Start: 2019-07-01 | End: 2019-07-01

## 2019-07-01 RX ORDER — METOPROLOL TARTRATE 50 MG/1
50 TABLET, FILM COATED ORAL 2 TIMES DAILY
Qty: 60 TABLET | Refills: 2 | Status: SHIPPED | OUTPATIENT
Start: 2019-07-01 | End: 2019-08-07 | Stop reason: SDUPTHER

## 2019-07-01 RX ORDER — BISACODYL 10 MG
10 SUPPOSITORY, RECTAL RECTAL DAILY
Status: DISCONTINUED | OUTPATIENT
Start: 2019-07-01 | End: 2019-07-01 | Stop reason: HOSPADM

## 2019-07-01 RX ORDER — OXYCODONE HYDROCHLORIDE AND ACETAMINOPHEN 5; 325 MG/1; MG/1
1 TABLET ORAL EVERY 6 HOURS PRN
Qty: 30 TABLET | Refills: 0 | Status: SHIPPED | OUTPATIENT
Start: 2019-07-01 | End: 2019-08-16 | Stop reason: DRUGHIGH

## 2019-07-01 RX ORDER — METOPROLOL TARTRATE 50 MG/1
50 TABLET, FILM COATED ORAL 2 TIMES DAILY
Qty: 60 TABLET | Refills: 2 | Status: SHIPPED | OUTPATIENT
Start: 2019-07-01 | End: 2019-07-01 | Stop reason: SDUPTHER

## 2019-07-01 RX ADMIN — CLOPIDOGREL 75 MG: 75 TABLET, FILM COATED ORAL at 18:15

## 2019-07-01 RX ADMIN — OXYCODONE HYDROCHLORIDE AND ACETAMINOPHEN 1 TABLET: 10; 325 TABLET ORAL at 08:40

## 2019-07-01 RX ADMIN — OXYCODONE HYDROCHLORIDE AND ACETAMINOPHEN 1 TABLET: 10; 325 TABLET ORAL at 02:19

## 2019-07-01 RX ADMIN — POLYETHYLENE GLYCOL 3350 17 G: 17 POWDER, FOR SOLUTION ORAL at 08:17

## 2019-07-01 RX ADMIN — POTASSIUM CHLORIDE 20 MEQ: 750 CAPSULE, EXTENDED RELEASE ORAL at 18:15

## 2019-07-01 RX ADMIN — OXYCODONE HYDROCHLORIDE AND ACETAMINOPHEN 1 TABLET: 10; 325 TABLET ORAL at 12:07

## 2019-07-01 RX ADMIN — OXYCODONE HYDROCHLORIDE AND ACETAMINOPHEN 1 TABLET: 10; 325 TABLET ORAL at 05:33

## 2019-07-01 RX ADMIN — AMIODARONE HYDROCHLORIDE 400 MG: 200 TABLET ORAL at 08:17

## 2019-07-01 RX ADMIN — IPRATROPIUM BROMIDE AND ALBUTEROL SULFATE 3 ML: 2.5; .5 SOLUTION RESPIRATORY (INHALATION) at 07:19

## 2019-07-01 RX ADMIN — METOPROLOL TARTRATE 50 MG: 25 TABLET, FILM COATED ORAL at 08:17

## 2019-07-01 RX ADMIN — BISACODYL 10 MG: 5 TABLET ORAL at 08:16

## 2019-07-01 RX ADMIN — ENOXAPARIN SODIUM 30 MG: 30 INJECTION SUBCUTANEOUS at 08:16

## 2019-07-01 RX ADMIN — BISACODYL 10 MG: 10 SUPPOSITORY RECTAL at 07:18

## 2019-07-01 RX ADMIN — ASPIRIN 81 MG: 81 TABLET ORAL at 08:17

## 2019-07-01 RX ADMIN — POTASSIUM CHLORIDE 20 MEQ: 750 CAPSULE, EXTENDED RELEASE ORAL at 08:17

## 2019-07-01 RX ADMIN — OXYCODONE HYDROCHLORIDE AND ACETAMINOPHEN 1 TABLET: 10; 325 TABLET ORAL at 15:10

## 2019-07-01 RX ADMIN — LISINOPRIL 2.5 MG: 2.5 TABLET ORAL at 08:16

## 2019-07-01 RX ADMIN — FUROSEMIDE 20 MG: 10 INJECTION, SOLUTION INTRAVENOUS at 08:16

## 2019-07-01 RX ADMIN — OXYCODONE HYDROCHLORIDE AND ACETAMINOPHEN 1 TABLET: 10; 325 TABLET ORAL at 18:35

## 2019-07-01 NOTE — PROGRESS NOTES
"CABG-four-vessel (left internal mammary artery/left anterior descending, sequential reverse saphenous vein graft/acute marginal and posterior descending artery, and reverse saphenous vein graft/first obtuse marginal), right endoscopic vein harvest, 57 channel TMR    Postop day 3    Doing well. Up in the chair. On room air. Rates pain 4/10. Walking without remark. (+) BM.    Visit Vitals  /58 (BP Location: Right arm, Patient Position: Lying)   Pulse 76   Temp 98.4 °F (36.9 °C) (Oral)   Resp 20   Ht 170 cm (66.93\")   Wt 89.5 kg (197 lb 4 oz)   SpO2 95%   BMI 30.96 kg/m²   baseline weight 192 pounds      Intake/Output Summary (Last 24 hours) at 7/1/2019 1150  Last data filed at 7/1/2019 0951  Gross per 24 hour   Intake 1460 ml   Output 485 ml   Net 975 ml     MCT: 50/24 hours  Left Sheri drain: 150 mL / 24 hours        Chest x ray: mild basilar atelectasis, no pneumothorax, no significant effusion    Physical Exam:  General: NAD, In good spirits  Cardiovascular: RRR, No murmur, rubs, or gallops.    Chest: Sternotomy Incision is clean, dry, and intact  Pulmonary: CTAB, No wheezing, rubs, or rales. On room air.   Abdomen: soft, Non-distended, and non-tender  Extremities: warm, PEGUERO, no edema, incisions are clean dry and intact-scant amount of old dried blood on saphenectomy site, dressing removed and left CATHY.   Neurologic:  No focal deficits, CN II-XII intact grossly.      Impression:  Coronary artery disease  Hyperlipidemia  Chronic tobacco use  Hypertension  Unstable angina  Leukocytosis improving    Medical decision-making/recommendations/plan:  Mediastinal tubes, sheri drain, and pacing wires removed  Continue usual postoperative cardiac surgery care  Discussed with he and his significant other.  Discussed with his nurse.  DC home this afternoon  "

## 2019-07-01 NOTE — PLAN OF CARE
Problem: Patient Care Overview  Goal: Plan of Care Review  Outcome: Ongoing (interventions implemented as appropriate)   07/01/19 7031   Coping/Psychosocial   Plan of Care Reviewed With patient;family   Plan of Care Review   Progress improving   OTHER   Outcome Summary Pt A&O. Room air. GHULAM and chest tube removed this morning per MD. C/o pain w/ relief from PRN pain meds. Pt showered. Dressing to upper abdomen changed and CDI. Incisions CDI. VSS. Safety maintained. DC home today.

## 2019-07-01 NOTE — PROGRESS NOTES
"CABG-four-vessel (left internal mammary artery/left anterior descending, sequential reverse saphenous vein graft/acute marginal and posterior descending artery, and reverse saphenous vein graft/first obtuse marginal), right endoscopic vein harvest  Postop day 2    Pain control excellent. Walking without remark.  No specific complaints at this time.  (-) BM      Visit Vitals  BP 99/62 (BP Location: Right arm, Patient Position: Lying)   Pulse 86   Temp 98.3 °F (36.8 °C) (Oral)   Resp 12   Ht 170 cm (66.93\")   Wt 90.4 kg (199 lb 6 oz)   SpO2 91%   BMI 31.29 kg/m²         Intake/Output Summary (Last 24 hours) at 6/30/2019 2321  Last data filed at 6/30/2019 2200  Gross per 24 hour   Intake 240 ml   Output 690 ml   Net -450 ml     MCT: 260/24 hours  Left Reese drain: 85 mL / 24 hours    Labs and chest x-ray reviewed    Physical Exam:    General: NAD, In good spirits  Cardiovascular: RRR, No murmur, rubs, or gallops.    Sternotomy Incision is clean, dry, and intact  Pulmonary: CTAB, No wheezing, rubs, or rales.  Abdomen: soft, Non-distended, and non-tender  Extremities: warm, PEGUERO, no edema, incisions are clean dry and intact  Neurologic:  No focal deficits, CN II-XII intact grossly.      Impression:  Coronary artery disease  Hyperlipidemia  Chronic tobacco use  Hypertension  Unstable angina  Leukocytosis improving    Medical decision-making/recommendations/plan:  He is doing very well.  Continue usual postoperative cardiac surgery care  Discussed with he and his significant other.  Discussed with his nurse.  Keep chest tubes for now  DC home soon.      "

## 2019-07-01 NOTE — DISCHARGE SUMMARY
[facility] Cardiothoracic Surgery  DISCHARGE SUMMARY        Date of Admission: 6/28/2019  Date of Discharge:  7/1/2019  Primary Care Physician: Krish Cobb MD    Discharge Diagnoses:  Active Hospital Problems    Diagnosis   • **Unstable angina (CMS/[facility])     Added automatically from request for surgery [id number]     • Obstructive sleep apnea   • Tobacco use   • Coronary artery disease of native artery of native heart with stable angina pectoris (CMS/HCC)     Added automatically from request for surgery [id number]     • HTN (hypertension)   • Hyperlipidemia   • Obesity (BMI 30-39.9)     Procedures Performed: CABG-four-vessel (left internal mammary artery/left anterior descending, sequential reverse saphenous vein graft/acute marginal and posterior descending artery, and reverse saphenous vein graft/first obtuse marginal), right endoscopic vein harvest, 57 channel transmyocardial revascularization performed on June 28, 2019 by Dr. Monzon.    HPI:  Mr. Irwin Esparza is a 48 y.o. male with salient history of facial numbness, GERD, obstructive sleep apnea, hypertension, hyperlipidemia, family history of early cardiovascular disease, obesity, chronic tobacco use, and use of chronic anti-inflammatory meds who presents with increasing shortness of breath and fatigue over the last month.  He had intermittent chest pain three days prior to admission.  He did take a sublingual nitroglycerin with resolution.  His chest pain was mid sternal with radiation to the left arm.  He was admitted and further evaluated with ultimately three-vessel coronary artery disease.  He was admitted with unstable angina.  He was medically stabilized and discharged home with plan consultation outpatient with Dr. Monzon, cardiothoracic surgery.  Preoperative testing was obtained and he was deemed an acceptable risk candidate.  Decision making was made to proceed with coronary artery bypass grafting.    Hospital Course: Mr. Esparza was  admitted on the morning of surgery on June 28, 2019.  Please see a separate op note by Dr. Monzon detailing the operation.  Following surgery, he was transferred to the intensive care unit in stable guarded condition.  He remained hemodynamically stable.  He was extubated overnight and demonstrated a neurologically intact exam on postop day 1.  He reported good pain control.  He is ambulating around the unit and transferred to  for continued recovery.  On postop day 2, pulmonary toilet, ambulation, and pain control were encouraged.  On postop day 3, mediastinal tubes, Reese drain, and pacing wires were removed without remark.  He is saturating appropriately on room air. He has good pain control. He is ambulating over 200 feet independently. He meets criteria for discharge home on post op day 3 and is agreeable to do so with his significant other.     Condition on Discharge:  Neurologically intact and has good pain control.  He is eating well and has demonstrable good bowel function.  The sternum is stable without clicks and the saphenectomy incisions are healing nicely.  The heart is in normal sinus rhythm.  He has met all physical therapy criteria and verbalizes understanding of sternotomy precautions.   He verbalizes understanding of a separately handed out cardiac surgery handout.       Discharge Disposition:  Home or Self Care [1]    Discharge Medications:     Discharge Medications      New Medications      Instructions Start Date   clopidogrel 75 MG tablet  Commonly known as:  PLAVIX   75 mg, Oral, Daily      oxyCODONE-acetaminophen 5-325 MG per tablet  Commonly known as:  PERCOCET   1 tablet, Oral, Every 6 Hours PRN         Changes to Medications      Instructions Start Date   lisinopril 2.5 MG tablet  Commonly known as:  PRINIVILZESTRIL  What changed:    · medication strength  · how much to take   2.5 mg, Oral, Daily      metoprolol tartrate 50 MG tablet  Commonly known as:  LOPRESSOR  What changed:     · medication strength  · how much to take  · when to take this   50 mg, Oral, 2 Times Daily         Continue These Medications      Instructions Start Date   acetaminophen 325 MG tablet  Commonly known as:  TYLENOL   650 mg, Oral, Every 4 Hours PRN      aspirin 81 MG chewable tablet   81 mg, Oral, Daily      atorvastatin 40 MG tablet  Commonly known as:  LIPITOR   40 mg, Oral, Nightly      carboxymethylcellulose 0.5 % solution  Commonly known as:  REFRESH PLUS   1 drop, Both Eyes, As Needed      Cholecalciferol 2000 units tablet   1 tablet, Oral, 2 Times Daily      methylphenidate 5 MG tablet  Commonly known as:  RITALIN   15 mg, Oral, Every 12 Hours Scheduled      nicotine 21 MG/24HR patch  Commonly known as:  NICODERM CQ   1 patch, Transdermal, Every 24 Hours      nitroglycerin 0.4 MG SL tablet  Commonly known as:  NITROSTAT   0.4 mg, Sublingual, Every 5 Minutes PRN, Take no more than 3 doses in 15 minutes.      omeprazole 20 MG capsule  Commonly known as:  priLOSEC   20 mg, Oral, 2 Times Daily         Stop These Medications    hydrochlorothiazide 25 MG tablet  Commonly known as:  HYDRODIURIL     isosorbide mononitrate 30 MG 24 hr tablet  Commonly known as:  IMDUR          Discharge Diet: Regular diet       Discharge Care Plan / Instructions: Please see the separately handed out cardiac surgery handout.    Activity at Discharge:   No heavy lifting greater than 5 pounds or a gallon of milk while maintaining sternal precautions.  Irwin Esparza has been instructed on an exercise regiment as detailed in a handed out cardiac surgery handout.    Tobacco:  Patient has been counseled as to smoking cessation. We discussed its benefits in regards to immediate recovery and the long-term benefits of avoidance of tobacco products. Informed of the smoking cessation program available here. We discussed the benefit of long-term health that tobacco cessation would convey.     BMI: Patient's Body mass index is 30.96 kg/m². BMI is  above normal parameters. Recommendations include: educational material, referral to primary care and Establishing durable lifestyle changes to accomplish a weight acceptable for age and height.    Follow-up Appointments: Irwin Esparza  is requested to see Krish Cobb MD within 1-2 weeks from time of discharge, to follow-up with Dr. Monzon in 4 weeks,  and to follow-up with Dr. Ahumada of the cardiology service in 6 weeks.

## 2019-07-01 NOTE — PAYOR COMM NOTE
"7/1/19 [facility] [phone]  [phone]    [facility] SURGERY    # [id number]    [facility] [phone]      FAXING CLINICAL.                Caden Esparza (48 y.o. Male)     Date of Birth Social Security Number Address Home Phone MRN    1970  [id number] [address] [phone] [id number]    [employer] Marital Status          [facility] Single       Admission Date Admission Type Admitting Provider Attending Provider Department, Room/Bed    6/28/19 Elective Jax Monzon MD Lopez, Nicholas M, MD [facility] 4B, 431/1    Discharge Date Discharge Disposition Discharge Destination                       Attending Provider:  Jax Monzon MD    Allergies:  No Known Allergies    Isolation:  None   Infection:  None   Code Status:  CPR    Ht:  170 cm (66.93\")   Wt:  89.5 kg (197 lb 4 oz)    Admission Cmt:  None   Principal Problem:  Unstable angina (CMS/HCC) [I20.0] More...                 Active Insurance as of 6/28/2019     Primary Coverage     Payor Plan Insurance Group Employer/Plan Group    [employer] [facility]      Payor Plan Address Payor Plan Phone Number Payor Plan Fax Number Effective Dates    [address] [phone]  6/1/2019 - None Entered    [facility] [address]       Subscriber Name Subscriber Birth Date Member ID       CADEN ESPARZA 1970 [id number]                 Emergency Contacts      (Rel.) Home Phone Work Phone Mobile Phone    Mable Harper (Significant Other) [phone] -- --            ICU Vital Signs     Row Name 07/01/19 1013 07/01/19 0733 07/01/19 0724 07/01/19 0719 07/01/19 0524       Height and Weight    Weight  --  --  --  --  89.5 kg (197 lb 4 oz)    Weight Method  --  --  --  --  Standing scale;Stated       Vitals    Temp  --  98.4 °F (36.9 °C)  --  --  --    Temp src  Oral  Oral  --  --  --    Pulse  76  76  84  75  --    Heart Rate Source  Monitor  Monitor  " Monitor  Monitor  --    Resp  20  18  15  14  --    Resp Rate Source  Visual  Visual  Visual  Visual  --    BP  102/58  106/57  --  --  --    BP Location  Right arm  Right arm  --  --  --    BP Method  Automatic  Automatic  --  --  --    Patient Position  Lying  Lying  --  --  --       Oxygen Therapy    SpO2  95 %  94 %  97 % post tx  93 %  --    Pulse Oximetry Type  Intermittent  Intermittent  Intermittent  Intermittent  --    Device (Oxygen Therapy)  room air  room air  room air  room air  --    Row Name 07/01/19 0459 06/30/19 2340 06/30/19 2213 06/30/19 2205 06/30/19 2025       Vitals    Temp  98.5 °F (36.9 °C)  98.2 °F (36.8 °C)  --  --  --    Temp src  Oral  Oral  --  --  --    Pulse  77  88  86  90  --    Heart Rate Source  Monitor  Monitor  Monitor  Monitor  --    Resp  14  12  --  12  --    Resp Rate Source  Visual  Visual  --  Visual  --    BP  102/61  105/59  --  --  --    BP Location  Right arm  Right arm  --  --  --    BP Method  Automatic  Automatic  --  --  --    Patient Position  Lying  Lying  --  --  --       Oxygen Therapy    SpO2  93 %  91 %  --  91 %  --    Pulse Oximetry Type  Intermittent  Intermittent  --  Intermittent  --    Device (Oxygen Therapy)  room air  room air  --  room air  room air    Row Name 06/30/19 1959 06/30/19 1508 06/30/19 1131 06/30/19 0800 06/30/19 0717       Vitals    Temp  98.3 °F (36.8 °C)  98.2 °F (36.8 °C)  98.6 °F (37 °C)  --  98.1 °F (36.7 °C)    Temp src  Oral  Oral  Oral  --  Oral    Pulse  90  84  95  --  81    Heart Rate Source  Monitor  Monitor  Monitor  --  Monitor    Resp  18  18  20  --  18    Resp Rate Source  Visual  Visual  Visual  --  Visual    BP  99/62  105/54  95/55  --  116/62    BP Location  Right arm  Right arm  Right arm  --  Left arm    BP Method  Automatic  Automatic  Automatic  --  Automatic    Patient Position  Lying  Lying  Sitting  --  Lying       Oxygen Therapy    SpO2  92 %  91 %  91 %  --  96 %    Pulse Oximetry Type  Intermittent   "Intermittent  Intermittent  --  Intermittent    Device (Oxygen Therapy)  room air  room air  room air  room air  room air    Row Name 06/30/19 0714 06/30/19 0708 06/30/19 0454 06/29/19 2345 06/29/19 2136       Height and Weight    Weight  --  --  90.4 kg (199 lb 6 oz)  --  --    Weight Method  --  --  Standing scale;Stated  --  --       Vitals    Temp  --  --  98.5 °F (36.9 °C)  99 °F (37.2 °C)  --    Temp src  --  --  Oral  Oral  --    Pulse  91  85  77  95  95    Heart Rate Source  Monitor  Monitor  Monitor  Monitor  Monitor    Resp  16  16  17  17  17    Resp Rate Source  Visual  Visual  Visual  Visual  Visual    BP  --  --  119/76  112/64  --    BP Location  --  --  Left arm  Left arm  --    BP Method  --  --  Automatic  Automatic  --    Patient Position  --  --  Lying  Lying  --       Oxygen Therapy    SpO2  91 %  90 %  91 %  90 %  92 %    Pulse Oximetry Type  Intermittent  Intermittent  Intermittent  Intermittent  Intermittent    Device (Oxygen Therapy)  room air  room air  nasal cannula  nasal cannula  nasal cannula    Flow (L/min)  --  --  1  1  1    Row Name 06/29/19 2050 06/29/19 2049 06/29/19 2016 06/29/19 2002 06/29/19 2001       Height and Weight    Height  --  170 cm (66.93\")  --  --  --    Height Method  --  Stated  --  --  --    Weight  --  90.5 kg (199 lb 9 oz)  --  --  --    Weight Method  --  Standing scale;Stated  --  --  --    Ideal Body Weight (IBW) (kg)  --  67.9  --  --  --    BSA (Calculated - sq m)  --  2.02 sq meters  --  --  --    BMI (Calculated)  --  31.3  --  --  --    Weight in (lb) to have BMI = 25  --  158.9  --  --  --       Vitals    Temp  --  99.1 °F (37.3 °C)  --  --  98.5 °F (36.9 °C)    Temp src  --  Oral  --  --  Oral    Pulse  --  99  102  101  103    Heart Rate Source  --  Monitor  --  --  --    Resp  --  18  --  --  --    Resp Rate Source  --  Visual  --  --  --    BP  --  132/78  126/76  106/76  109/76    Noninvasive MAP (mmHg)  --  --  90  --  88    BP Location  --  Left " arm  --  --  --    BP Method  --  Automatic  --  --  --    Patient Position  --  Lying  --  --  --       Oxygen Therapy    SpO2  --  100 %  --  --  --    Pulse Oximetry Type  --  Intermittent  --  --  --    Device (Oxygen Therapy)  nasal cannula  room air  --  --  --    Flow (L/min)  1  --  --  --  --    Row Name 06/29/19 1946 06/29/19 1931 06/29/19 1900 06/29/19 1815 06/29/19 1800       Vitals    Pulse  99  100  109  99  94    Resp  --  --  --  18  18    BP  113/73  109/75  --  130/79  127/80    Noninvasive MAP (mmHg)  86  86  --  95  94       Oxygen Therapy    SpO2  --  --  --  92 %  92 %    Row Name 06/29/19 1745 06/29/19 1730 06/29/19 1715 06/29/19 1700 06/29/19 1645       Vitals    Pulse  95  103  105  93  84    Resp  17  17  17  18  18    BP  123/71  107/76  116/74  123/89  107/74    Noninvasive MAP (mmHg)  87  86  86  101  84       Oxygen Therapy    SpO2  93 %  90 %  94 %  96 %  96 %    Row Name 06/29/19 1630 06/29/19 1611 06/29/19 1600 06/29/19 1545 06/29/19 1530       Vitals    Pulse  83  --  87  88  --    Resp  19  --  18  18  18    BP  105/69  --  102/69  115/74  --    Noninvasive MAP (mmHg)  81  --  79  85  --       Oxygen Therapy    SpO2  96 %  --  89 %  (Abnormal)   90 %  --    Device (Oxygen Therapy)  --  nasal cannula  room air  --  --    Flow (L/min)  --  2  --  --  --    Row Name 06/29/19 1515 06/29/19 1500 06/29/19 1445 06/29/19 1415 06/29/19 1400       Vitals    Pulse  87  83  84  81  84    Resp  18  19  19  18  18    BP  105/72  101/71  114/73  105/71  104/69    Noninvasive MAP (mmHg)  81  81  86  82  81       Oxygen Therapy    SpO2  91 %  93 %  93 %  97 %  97 %    Row Name 06/29/19 1345 06/29/19 1330 06/29/19 1315 06/29/19 1300          Vitals    Pulse  84  84  84  84     Resp  19  17  18  18     BP  111/74  105/71  109/73  104/69     Noninvasive MAP (mmHg)  84  83  82  81        Oxygen Therapy    SpO2  95 %  96 %  96 %  96 %         Lab Results (last 24 hours)     Procedure Component Value  "Units Date/Time    Basic Metabolic Panel [[id number]]  (Abnormal) Collected:  07/01/19 0451    Specimen:  Blood Updated:  07/01/19 0557     Glucose 105 mg/dL      BUN 10 mg/dL      Creatinine 0.81 mg/dL      Sodium 138 mmol/L      Potassium 4.5 mmol/L      Chloride 101 mmol/L      CO2 29.0 mmol/L      Calcium 8.1 mg/dL      eGFR Non African Amer 101 mL/min/1.73      BUN/Creatinine Ratio 12.3     Anion Gap 8.0 mmol/L     Narrative:       GFR Normal >60  Chronic Kidney Disease <60  Kidney Failure <15    CBC (No Diff) [[id number]]  (Abnormal) Collected:  07/01/19 0451    Specimen:  Blood Updated:  07/01/19 0528     WBC 10.65 10*3/mm3      RBC 3.21 10*6/mm3      Hemoglobin 9.9 g/dL      Hematocrit 28.7 %      MCV 89.4 fL      MCH 30.8 pg      MCHC 34.5 g/dL      RDW 12.6 %      RDW-SD 40.9 fl      MPV 10.8 fL      Platelets 221 10*3/mm3            Physician Progress Notes (last 24 hours) (Notes from 6/30/2019 12:51 PM through 7/1/2019 12:51 PM)      Jadyn Peralta, APRN at 7/1/2019  9:50 AM          CABG-four-vessel (left internal mammary artery/left anterior descending, sequential reverse saphenous vein graft/acute marginal and posterior descending artery, and reverse saphenous vein graft/first obtuse marginal), right endoscopic vein harvest, 57 channel TMR    Postop day 3    Doing well. Up in the chair. On room air. Rates pain 4/10. Walking without remark. (+) BM.    Visit Vitals  /58 (BP Location: Right arm, Patient Position: Lying)   Pulse 76   Temp 98.4 °F (36.9 °C) (Oral)   Resp 20   Ht 170 cm (66.93\")   Wt 89.5 kg (197 lb 4 oz)   SpO2 95%   BMI 30.96 kg/m²   baseline weight 192 pounds      Intake/Output Summary (Last 24 hours) at 7/1/2019 1150  Last data filed at 7/1/2019 0951  Gross per 24 hour   Intake 1460 ml   Output 485 ml   Net 975 ml     MCT: 50/24 hours  Left Reese drain: 150 mL / 24 hours        Chest x ray: mild basilar atelectasis, no pneumothorax, no significant effusion    Physical " Exam:  General: NAD, In good spirits  Cardiovascular: RRR, No murmur, rubs, or gallops.    Chest: Sternotomy Incision is clean, dry, and intact  Pulmonary: CTAB, No wheezing, rubs, or rales. On room air.   Abdomen: soft, Non-distended, and non-tender  Extremities: warm, PEGUERO, no edema, incisions are clean dry and intact-scant amount of old dried blood on saphenectomy site, dressing removed and left CATHY.   Neurologic:  No focal deficits, CN II-XII intact grossly.      Impression:  Coronary artery disease  Hyperlipidemia  Chronic tobacco use  Hypertension  Unstable angina  Leukocytosis improving    Medical decision-making/recommendations/plan:  Mediastinal tubes, sheri drain, and pacing wires removed  Continue usual postoperative cardiac surgery care  Discussed with he and his significant other.  Discussed with his nurse.  DC home this afternoon    Electronically signed by Jadyn Peralta APRN at 7/1/2019 11:57 AM

## 2019-07-01 NOTE — PLAN OF CARE
Problem: Patient Care Overview  Goal: Plan of Care Review  Outcome: Ongoing (interventions implemented as appropriate)   07/01/19 0519   Coping/Psychosocial   Plan of Care Reviewed With patient;significant other   Plan of Care Review   Progress improving   OTHER   Outcome Summary VSS. PRN pain meds adm. throughout night for incisional chest pain. Incisions remains C/D/I. Pt. C/o of not being able to have a BM this morning but is passing gas. CXR completed. Small amount of drainage from chest tube and GHULAM drain. Ambulated in maciel  x2 during shift with no complication. Encouraged use of incentive spirometer. Sinus 80-95 on tele.        Problem: Skin Injury Risk (Adult)  Goal: Identify Related Risk Factors and Signs and Symptoms  Outcome: Ongoing (interventions implemented as appropriate)   07/01/19 0519   Skin Injury Risk (Adult)   Related Risk Factors (Skin Injury Risk) mobility impaired;medical devices     Goal: Skin Health and Integrity  Outcome: Ongoing (interventions implemented as appropriate)   07/01/19 0519   Skin Injury Risk (Adult)   Skin Health and Integrity making progress toward outcome       Problem: Fall Risk (Adult)  Goal: Identify Related Risk Factors and Signs and Symptoms  Outcome: Ongoing (interventions implemented as appropriate)   07/01/19 0519   Fall Risk (Adult)   Related Risk Factors (Fall Risk) culprit medication(s);gait/mobility problems;environment unfamiliar   Signs and Symptoms (Fall Risk) presence of risk factors     Goal: Absence of Fall  Outcome: Ongoing (interventions implemented as appropriate)   07/01/19 0519   Fall Risk (Adult)   Absence of Fall making progress toward outcome       Problem: Cardiac Surgery (Adult)  Goal: Signs and Symptoms of Listed Potential Problems Will be Absent, Minimized or Managed (Cardiac Surgery)  Outcome: Ongoing (interventions implemented as appropriate)   07/01/19 0519   Goal/Outcome Evaluation   Problems Assessed (Cardiac Surgery) all   Problems Present  (Cardiac Surgery) bowel motility decreased;pain;situational response       Problem: Pain, Chronic (Adult)  Goal: Identify Related Risk Factors and Signs and Symptoms  Outcome: Ongoing (interventions implemented as appropriate)    Goal: Acceptable Pain/Comfort Level and Functional Ability  Outcome: Ongoing (interventions implemented as appropriate)   07/01/19 0519   Pain, Chronic (Adult)   Acceptable Pain/Comfort Level and Functional Ability making progress toward outcome

## 2019-07-02 ENCOUNTER — READMISSION MANAGEMENT (OUTPATIENT)
Dept: CALL CENTER | Facility: HOSPITAL | Age: 49
End: 2019-07-02

## 2019-07-02 PROBLEM — G47.33 OBSTRUCTIVE SLEEP APNEA: Status: ACTIVE | Noted: 2019-07-02

## 2019-07-02 NOTE — PAYOR COMM NOTE
"Caden Prater (48 y.o. Male)     Date of Birth Social Security Number Address Home Phone MRN    1970  [id number] [phone] [id number]    Oriental orthodox Marital Status          [facility] Single       Admission Date Admission Type Admitting Provider Attending Provider Department, Room/Bed    6/28/19 Elective Jax Monzon MD  [facility] 4B, 431/1    Discharge Date Discharge Disposition Discharge Destination        7/1/2019 Home or Self Care Home             Attending Provider:  (none)   Allergies:  No Known Allergies    Isolation:  None   Infection:  None   Code Status:  Prior    Ht:  170 cm (66.93\")   Wt:  89.5 kg (197 lb 4 oz)    Admission Cmt:  None   Principal Problem:  Unstable angina (CMS/HCC) [I20.0] More...                 Active Insurance as of 6/28/2019     Primary Coverage     Payor Plan Insurance Group Employer/Plan Group    [id number]      Payor Plan Address Payor Plan Phone Number Payor Plan Fax Number Effective Dates    [address] [phone]  6/1/2019 - None Entered    [facility] [id number]       Subscriber Name Subscriber Birth Date Member ID       CADEN PRATER 1970 [id number]                 Emergency Contacts      (Rel.) Home Phone Work Phone Mobile Phone    Mable Harper (Significant Other) [phone] -- --            Discharge Order (From admission, onward)    Start     Ordered    07/01/19 1746  Discharge patient  Once     Expected Discharge Date:  07/01/19    Discharge Disposition:  Home or Self Care    Physician of Record for Attribution - Please select from Treatment Team:  JAX MONZON [[id number]]    Review needed by CMO to determine Physician of Record:  No       Question Answer Comment   Physician of Record for Attribution - Please select from Treatment Team JAX MONZON    Review needed by CMO to determine Physician of Record No        07/01/19 1748              "

## 2019-07-02 NOTE — THERAPY DISCHARGE NOTE
Acute Care - Physical Therapy Progress Note/Discharge   [place]     Patient Name: Irwin Esparza  : 1970  MRN: [id number]  Today's Date: 2019  Onset of Illness/Injury or Date of Surgery: 19  Date of Referral to PT: 19  Referring Physician: Dr. Monzon    Admit Date: 2019    Visit Dx:    ICD-10-CM ICD-9-CM   1. Coronary artery disease of native artery of native heart with stable angina pectoris (CMS/HCC) I25.118 414.01     413.9   2. Impaired mobility Z74.09 799.89     Patient Active Problem List   Diagnosis   • Stroke syndrome   • HTN (hypertension)   • Hyperlipidemia   • Reflux esophagitis   • Smoker   • Obesity (BMI 30-39.9)   • Abnormal stress test   • Unstable angina (CMS/HCC)   • Coronary artery disease involving native coronary artery of native heart without angina pectoris   • Coronary artery disease of native artery of native heart with stable angina pectoris (CMS/HCC)   • Tobacco use       Physical Therapy Education     Title: PT OT SLP Therapies (Resolved)     Topic: Physical Therapy (Resolved)     Point: Mobility training (Resolved)     Learning Progress Summary           Patient Acceptance, E, VU by SB at 2019 12:38 PM    Comment:  pt edu on sternal precautions, POC, benefits of act and d/c plans                   Point: Home exercise program (Resolved)     Learning Progress Summary           Patient Acceptance, E, VU by SB at 2019 12:38 PM    Comment:  pt edu on sternal precautions, POC, benefits of act and d/c plans                   Point: Body mechanics (Resolved)     Learning Progress Summary           Patient Acceptance, E, VU by SB at 2019 12:38 PM    Comment:  pt edu on sternal precautions, POC, benefits of act and d/c plans                   Point: Precautions (Resolved)     Learning Progress Summary           Patient Acceptance, E, VU by SB at 2019 12:38 PM    Comment:  pt edu on sternal precautions, POC, benefits of act and d/c plans                                User Key     Initials Effective Dates Name Provider Type Discipline    SB 08/31/18 -  Page Darnell, PT Physical Therapist PT              Rehab Goal Summary     Row Name 07/02/19 1000             Bed Mobility Goal 1 (PT)    [place] Level/Cues Needed (Bed Mobility Goal 1, PT)  independent Goal: supervision  -GHULAM      Progress/Outcomes (Bed Mobility Goal 1, PT)  goal met  -GHULAM         Transfer Goal 1 (PT)    [place] Level/Cues Needed (Transfer Goal 1, PT)  independent Goal: supervision  -GHULAM      Progress/Outcome (Transfer Goal 1, PT)  goal met  -GHULAM         Gait Training Goal 1 (PT)    [place] Level (Gait Training Goal 1, PT)  independent Goal: supervsion  -GHULAM      Distance (Gait Goal 1, PT)  1000 Goal:500  -GHULAM      Progress/Outcome (Gait Training Goal 1, PT)  goal met  -GHULAM        User Key  (r) = Recorded By, (t) = Taken By, (c) = Cosigned By    Initials Name Provider Type Discipline    GHULAM Franchesca Madrid, PTA Physical Therapy Assistant PT        Therapy Treatment  Rehabilitation Treatment Summary     Row Name                Wound 06/28/19 1402 chest incision    Wound - Properties Group Date first assessed: 06/28/19 [TM] Time first assessed: 1402 [TM] Location: chest [TM] Type: incision [TM] Recorded by:  [TM] Tanja Pina RN 06/28/19 1402    Row Name                Wound 06/28/19 1402 Right leg incision    Wound - Properties Group Date first assessed: 06/28/19 [TM] Time first assessed: 1402 [TM] Side: Right [TM] Location: leg [TM] Type: incision [TM] Recorded by:  [TM] Tanja Pina RN 06/28/19 1402      User Key  (r) = Recorded By, (t) = Taken By, (c) = Cosigned By    Initials Name Effective Dates Discipline    TM Tanja Pina RN 07/26/16 -  Nurse             PT Recommendation and Plan  Anticipated Discharge Disposition (PT): home  Outcome Summary/Treatment Plan (PT)  Anticipated Discharge Disposition (PT): home    Outcome Measures     Row Name 06/29/19 1200              How much help from another person do you currently need...    Turning from your back to your side while in flat bed without using bedrails?  3  -SB      Moving from lying on back to sitting on the side of a flat bed without bedrails?  3  -SB      Moving to and from a bed to a chair (including a wheelchair)?  3  -SB      Standing up from a chair using your arms (e.g., wheelchair, bedside chair)?  3  -SB      Climbing 3-5 steps with a railing?  3  -SB      To walk in hospital room?  3  -SB      AM-PAC 6 Clicks Score (PT)  18  -SB         Functional Assessment    Outcome Measure Options  AM-PAC 6 Clicks Basic Mobility (PT)  -SB        User Key  (r) = Recorded By, (t) = Taken By, (c) = Cosigned By    Initials Name Provider Type    Page Lance, PT Physical Therapist           Time Calculation:         PT G-Codes  Outcome Measure Options: AM-PAC 6 Clicks Basic Mobility (PT)  AM-PAC 6 Clicks Score (PT): 18    PT Discharge Summary  Anticipated Discharge Disposition (PT): home  Reason for Discharge: Discharge from facility  Outcomes Achieved: Able to achieve all goals within established timeline  Discharge Destination: Home    Franchesca Madrid, PTA  7/2/2019

## 2019-07-02 NOTE — OUTREACH NOTE
Prep Survey      Responses   Facility patient discharged from?  [facility]   Is patient eligible?  Yes   Discharge diagnosis  CABGX4 with LIMA, EVH/RLE   Does the patient have one of the following disease processes/diagnoses(primary or secondary)?  Cardiothoracic surgery   Does the patient have Home health ordered?  No   Is there a DME ordered?  No   Comments regarding appointments  see AVS   Medication alerts for this patient  plavix, lisinopril, lopressor   Prep survey completed?  Yes          Bhavya Harper RN

## 2019-07-03 ENCOUNTER — READMISSION MANAGEMENT (OUTPATIENT)
Dept: CALL CENTER | Facility: HOSPITAL | Age: 49
End: 2019-07-03

## 2019-07-03 NOTE — OUTREACH NOTE
CT Surgery Week 1 Survey      Responses   Facility patient discharged from?  [facility]   Does the patient have one of the following disease processes/diagnoses(primary or secondary)?  Cardiothoracic surgery   Is there a successful TCM telephone encounter documented?  No   Week 1 attempt successful?  Yes   Call start time  0959   Call end time  1006   Discharge diagnosis  CABGX4 with LIMA, EVH/DANIELE   Is patient permission given to speak with other caregiver?  Yes   List who call center can speak with  Mable, sig other   Meds reviewed with patient/caregiver?  Yes   Is the patient having any side effects they believe may be caused by any medication additions or changes?  No   Is the patient taking all medications as directed (includes completed medication regime)?  Yes   Does the patient have a primary care provider?   Yes   Does the patient have an appointment scheduled with their C/T surgeon?  Yes [ Jax Monzon 8/6/19]   Comments regarding PCP  Krish Cobb MD , Tuesday Jul 16, 2019 @ 11:00 AM    Has the patient kept scheduled appointments due by today?  N/A   Has home health visited the patient within 72 hours of discharge?  N/A   Psychosocial issues?  No   Did the patient receive a copy of their discharge instructions?  Yes   Nursing interventions  Reviewed instructions with patient   What is the patient's perception of their health status since discharge?  Improving   Nursing interventions  Nurse provided patient education   Is the patient/caregiver able to teach back normal signs of recovery?  Pain or discomfort at incisional site   Nursing interventions  Reassured on normal signs of recovery   Is the patient /caregiver able to teach back basic post-op care?  Continue use of incentive spirometry at least 1 week post discharge, Practice 'cough and deep breath', Keep incision areas clean, dry and protected   Is the patient/caregiver able to teach back signs and symptoms of incisional infection?  Increased  redness, swelling or pain at the incisonal site, Increased drainage or bleeding, Incisional warmth, Pus or odor from incision, Fever   Is the patient/caregiver able to teach back steps to recovery at home?  Set small, achievable goals for return to baseline health, Rest and rebuild strength, gradually increase activity, Eat a well-balance diet   Is the patient /caregiver able to teach back the importance of cardiac rehab?  Yes   Nursing interventions  Provided education on importance of cardiac rehab   Is the patient/caregiver able to teach back the hierarchy of who to call/visit for symptoms/problems? PCP, Specialist, Home health nurse, Urgent Care, ED, 911  Yes   Week 1 call completed?  Yes            Alison Gonsalves RN

## 2019-07-10 ENCOUNTER — READMISSION MANAGEMENT (OUTPATIENT)
Dept: CALL CENTER | Facility: HOSPITAL | Age: 49
End: 2019-07-10

## 2019-07-10 NOTE — OUTREACH NOTE
CT Surgery Week 2 Survey      Responses   Facility patient discharged from?  [facility]   Does the patient have one of the following disease processes/diagnoses(primary or secondary)?  Cardiothoracic surgery   Week 2 attempt successful?  No   Unsuccessful attempts  Attempt 1          Shira Corey RN

## 2019-07-11 ENCOUNTER — READMISSION MANAGEMENT (OUTPATIENT)
Dept: CALL CENTER | Facility: HOSPITAL | Age: 49
End: 2019-07-11

## 2019-07-11 NOTE — OUTREACH NOTE
CT Surgery Week 2 Survey      Responses   Facility patient discharged from?  [facility]   Does the patient have one of the following disease processes/diagnoses(primary or secondary)?  Cardiothoracic surgery   Week 2 attempt successful?  Yes   Call start time  1251   Call end time  1254   Discharge diagnosis  CABGX4 with LIMA, EVH/RLE   Meds reviewed with patient/caregiver?  Yes   Is the patient having any side effects they believe may be caused by any medication additions or changes?  No   Does the patient have all medications related to this admission filled (includes all antibiotics, pain medications, cardiac medications, etc.)  Yes   Is the patient taking all medications as directed (includes completed medication regime)?  Yes   Does the patient have a primary care provider?   Yes   Does the patient have an appointment scheduled with their C/T surgeon?  Yes   Has the patient kept scheduled appointments due by today?  N/A   Has home health visited the patient within 72 hours of discharge?  N/A   Psychosocial issues?  No   Did the patient receive a copy of their discharge instructions?  Yes   Nursing interventions  Reviewed instructions with patient   What is the patient's perception of their health status since discharge?  Improving   Nursing interventions  Nurse provided patient education   Is the patient/caregiver able to teach back normal signs of recovery?  Pain or discomfort at incisional site   Nursing interventions  Reassured on normal signs of recovery   Is the patient /caregiver able to teach back basic post-op care?  Continue use of incentive spirometry at least 1 week post discharge, Practice 'cough and deep breath', Drive as instructed by MD in discharge instructions, Take showers only when approved by MD-sponge bathe until then, No tub bath, swimming, or hot tub until instructed by MD, Keep incision areas clean, dry and protected, Lifting as instructed by MD in discharge instructions   Is the  patient/caregiver able to teach back signs and symptoms of incisional infection?  Increased redness, swelling or pain at the incisonal site, Increased drainage or bleeding, Incisional warmth, Pus or odor from incision, Fever   Is the patient/caregiver able to teach back steps to recovery at home?  Set small, achievable goals for return to baseline health, Rest and rebuild strength, gradually increase activity   Is the patient /caregiver able to teach back the importance of cardiac rehab?  Yes   Is the patient/caregiver able to teach back the hierarchy of who to call/visit for symptoms/problems? PCP, Specialist, Home health nurse, Urgent Care, ED, 911  Yes   Week 2 call completed?  Yes          Debi Lopez RN

## 2019-07-15 NOTE — OP NOTE
Date of Procedure:  6/28/2019    Preoperative Diagnosis:   1.  Coronary artery disease of native artery of native heart with stable angina pectoris (CMS/[facility]) [I25.118]  2.  Chronic tobacco use    Postoperative Diagnosis:  same    Procedures Performed:  1.  Coronary artery bypass grafting-4 vessel (left internal mammary artery/left anterior descending, sequential reverse saphenous vein graft/acute marginal and posterior descending artery, and reverse saphenous vein graft/first obtuse marginal)  2.  Transmyocardial revascularization- 57 channels  2. Right endoscopic vein harvest with open extension    Surgeon:  Jax Monzon MD  Assistants:  Heather Patel and Tanja Kolb   Anesthesia Staff:  CRNA: Ray Hernandez CRNA  Anesthesia Type:  General    Estimated Blood Loss:  Minimal (Cell Saver)  Drains:  1. 19 Gibraltarian Reese drain-left pleural space  2. 24 Gibraltarian Reese drains-anterior and posterior mediastinum    Specimens:  None       Operative Findings:  Excellent arterial conduit. Good venous conduit. The  PDA at site of grafting measured 1.3 mm in size and had severe atherosclerotic disease burden. Post bypass grafting had acceptable arterial runoff.  The acute marginal measured 1.5 mm in size and had mild atherosclerotic disease burden.  Post bypass grafting had excellent arterial runoff. The first obtuse marginal measured 1.4 mm in size and had good arterial runoff with moderate-severe atherosclerotic disease burden.    The LAD at site of grafting measured 1.9 mm in size and had mild-moderate atherosclerotic disease burden.  Post bypass grafting it had excellent arterial runoff.    Transesophageal echocardiography salient findings include preserved left ventricular function with no significant valvular disease.      He is not a candidate for surgical revascularization of the Cx and right coronary distributions.  The right system graft lays such that a mitral valve operation can be performed via a standard  left atriotomy without difficulty.       Total aortic cross-clamp time: 114 minutes  Total cardiac bypass time: 150 minutes     Operative description in detail:  The patient was taken to the operative suite where he  was placed in a supine position.  Induction of general anesthesia and placement of a single-lumen endotracheal tube was performed without remark.  Appropriate arterial and venous access was established without remark.  Through the previously placed right internal jugular central venous line, a pulmonary artery catheter was floated into position.  The patient  was then prepped and draped in the usual and sterile surgical fashion.  A timeout was performed.  Perioperative antibiotics were administered. Beta blocker was given.    A two team approach was utilized to harvest both the left internal mammary artery and the right greater saphenous vein.   Briefly the right greater saphenous vein was taken at the level of the knee medially and taken in a prograde fashion for an anticipated length of vein to the fossa ovalis.  Blunt dissection was performed and each branch was taken using the Sensum device.  A counter stab incision was made with both proximal and distal control obtained.  The vein was extracted from a hemostatic tunnel and additional vein was taken open towards the ankle for planned 4 V bypass grafting.  The leg was closed in a layered fashion with Vicryl suture.  The vein was prepared without remark.      While the vein was being harvested, median sternotomy was performed by me. Pericardial fat in midline from the level of the innominate vein to the level of the diaphragm was divided in midline.  A Rultract device was used to expose the posterior sternal table.  The left internal mammary artery was taken down using a standard pedicle technique with a combination of electrocautery and/or clips to control all side branches.  At that time, the patient was systemically anticoagulated with IV  heparin.  A 19 Turkmen Reese drain was placed in the left pleural space.  After suitable time of circulating heparin, clips were placed doubly onto the mammary artery distally and it was divided proximal to the previously placed clips.  It had excellent arterial inflow.  The mammary artery was controlled distally.  The mammary artery harvest bed was hemostatic.  The Rultract device was removed from the sterile field and a Genesse retractor was used for exposure.  The mammary artery was prepared for bypass grafting and deemed excellent.  A pericardial well was created. I elected to cannulate the heart centrally accessing distally the ascending aorta and the right atrial appendage.  Each cannula was placed in continuity with the appropriate pump line. Retrograde autologous prime was completed as indicated.  A combined cardioplegia/aortic root vent set was secured with a horizontal mattress 4-0 Prolene suture.  With an appropriate ACT and all in readiness, cardiopulmonary bypass was commenced.  I dissected out the first obtuse marginal, acute marginal, PDA, and the LAD for suitable sites for bypass grafting.  With that, I proceeded to apply the aortic cross-clamp and administered cardioplegia utilizing a warm induction strategy.  Upon achieving electrical-mechanical arrest, cold blood potassium cardioplegia was administered to a total standard dose.  We did implement systemic hypothermia mild and apply topical hypothermia to the ventricle.  At appropriate intervals, doses of cardioplegia were administered throughout the conduct of bypass grafting.     I directed my attention towards the PDA.  A coronary arteriotomy was made and augmented to size with Blanca scissors.  It is as per operative findings.  The anastomosis was constructed in an end-to-side orientation with running 7-0 Prolene suture.  With that its proximal anastomosis was  constructed following aortotomy with 11 blade and augmented size with 4 mm arterial  punch after the acute marginal distal was performed.  This was constructed in a side aorta end vein graft fashion with running 6-0 Prolene suture. An AC  was placed.  The graft was assessed for lay which was excellent. It is without tension or torsion.     After the distance between anastomoses ( AM and PDA) is determined a similar distance is made onto the vein with a venotomy sharply made.  It is augmented to size equaling the size of the coronary arteriotomy of the AM.  A side to side vein to coronary artery anastomosis is made with running 7-0 prolene suture.  It is hemostatic and as per operative findings.     To that end I directed my attention towards the first obtuse marginal.  A coronary arteriotomy was made and augmented to size with Blanca scissors.  It is as per operative findings.  The anastomosis was constructed in an end-to-side orientation with running 7-0 Prolene suture.  With that its proximal anastomosis was constructed following aortotomy with 11 blade and augmented size with 4 mm arterial punch subsequently.  This was constructed in a side aorta end vein graft fashion with running 6-0 Prolene suture. An AC  was placed.  The graft was assessed for lay which was excellent.  It is without tension or torsion.     During a dose of cardioplegia, a pericardial slit left lateral was made while dividing associate pericardiophrenic fat and vasculature while being mindful of the lay of the phrenic nerve.  As such I proceeded to obtain control proximally onto the mammary artery and spatulated it distally.  I grafted this onto the LAD following coronary arteriotomy using previous described techniques.  The anastomosis was constructed in an end-to-side orientation with running 8-0 Prolene suture.  It is as per operative findings and the anastomosis was hemostatic.  I did tack the mammary artery pedicle to the anterior aspect heart with 6-0 Prolene sutures.    With that being accomplished, a  terminal hotshot was administered.  The patient was placed in trendelenburg position.  Upon completion of terminal hotshot and placement of temporary epicardial pacing wires, with the aortic vent on high and pump flows diminished, the aortic cross-clamp was released.  With that, full support was implemented.  A perfusable rhythm spontaneously returned. A nonworking beating phase was implemented.  During this time, transmyocardial revascularization performed to the circumflex and coronary artery distribution performing 57 separate channels using a Holmium:YAG Laser.      Ventilation restored.  I surveyed each graft and each anastomosis was hemostatic and had excellent lay.  With all in readiness, the heart was allowed to fill.  De-airing maneuvers were performed as guided by transesophageal echocardiography.  With that the heart was decompressed and we removed the aortic root vent/cardioplegia cannula set.  Its associated pursestring suture was tied securely and this was reinforced as per matter of routine. The table was now placed in neutral position.  With all in readiness, we proceeded to wean from and separate from cardiopulmonary bypass.  I did decannulate the venous line and snared down its associated pursestring suture.  Systemic intravenous protamine was administered.  All associated blood volume was returned to the patient. With continued good hemodynamics, I decannulate the arterial line and tied down it associated pursestring sutures.  At this time I tied down the previously snared pursestring suture.  The mediastinum was drained with 24 Croatian Reese drains placed anteriorly and posteriorly.  I surveyed the chest and hemostasis was pristine.  With that I impregnated the sternal edges with vancomycin, thrombin, and Gelfoam paste.  The sternum was reapproximated with stainless sterile wires placed in an interrupted fashion.  In layers anatomically the soft tissue planes were reapproximated. Instruments,  sharps, and sponge counts were reported as correct.      Complications: None     Disposition: Transferred to ICU in stable and guarded condition.

## 2019-07-18 ENCOUNTER — READMISSION MANAGEMENT (OUTPATIENT)
Dept: CALL CENTER | Facility: HOSPITAL | Age: 49
End: 2019-07-18

## 2019-07-18 NOTE — OUTREACH NOTE
CT Surgery Week 3 Survey      Responses   Facility patient discharged from?  [place]   Does the patient have one of the following disease processes/diagnoses(primary or secondary)?  Cardiothoracic surgery   Week 3 attempt successful?  No   Unsuccessful attempts  Attempt 1          Alison Gonsalves RN

## 2019-07-19 ENCOUNTER — READMISSION MANAGEMENT (OUTPATIENT)
Dept: CALL CENTER | Facility: HOSPITAL | Age: 49
End: 2019-07-19

## 2019-07-19 NOTE — OUTREACH NOTE
CT Surgery Week 3 Survey      Responses   Facility patient discharged from?  [place]   Does the patient have one of the following disease processes/diagnoses(primary or secondary)?  Cardiothoracic surgery   Week 3 attempt successful?  Yes   Call start time  1552   Call end time  1557   Meds reviewed with patient/caregiver?  Yes   Is the patient having any side effects they believe may be caused by any medication additions or changes?  No   Does the patient have all medications related to this admission filled (includes all antibiotics, pain medications, cardiac medications, etc.)  Yes   Is the patient taking all medications as directed (includes completed medication regime)?  Yes   Does the patient have a primary care provider?   Yes   Does the patient have an appointment scheduled with their C/T surgeon?  Yes   Comments regarding PCP  [facility]   Has the patient kept scheduled appointments due by today?  Yes   Psychosocial issues?  No   Did the patient receive a copy of their discharge instructions?  Yes   Nursing interventions  Reviewed instructions with patient   What is the patient's perception of their health status since discharge?  Improving   Is the patient/caregiver able to teach back steps to recovery at home?  Set small, achievable goals for return to baseline health, Rest and rebuild strength, gradually increase activity, Weigh daily, Practice good oral hygiene, Eat a well-balance diet, Make a list of questions for surgeon's appointment   Is the patient /caregiver able to teach back the importance of cardiac rehab?  Yes   Is the patient/caregiver able to teach back the hierarchy of who to call/visit for symptoms/problems? PCP, Specialist, Home health nurse, Urgent Care, ED, 911  Yes   Week 3 call completed?  Yes          Yamila Boswell RN   Detail Level: Simple

## 2019-07-22 ENCOUNTER — OFFICE VISIT (OUTPATIENT)
Dept: CARDIAC SURGERY | Facility: CLINIC | Age: 49
End: 2019-07-22

## 2019-07-22 VITALS
SYSTOLIC BLOOD PRESSURE: 94 MMHG | BODY MASS INDEX: 29.79 KG/M2 | WEIGHT: 189.8 LBS | OXYGEN SATURATION: 98 % | HEIGHT: 67 IN | HEART RATE: 57 BPM | DIASTOLIC BLOOD PRESSURE: 52 MMHG

## 2019-07-22 DIAGNOSIS — E66.3 OVERWEIGHT: ICD-10-CM

## 2019-07-22 DIAGNOSIS — I25.118 CORONARY ARTERY DISEASE OF NATIVE ARTERY OF NATIVE HEART WITH STABLE ANGINA PECTORIS (HCC): Primary | ICD-10-CM

## 2019-07-22 DIAGNOSIS — L24.A9 WOUND DRAINAGE: ICD-10-CM

## 2019-07-22 DIAGNOSIS — Z87.891 FORMER SMOKER: ICD-10-CM

## 2019-07-22 DIAGNOSIS — I20.0 UNSTABLE ANGINA (HCC): ICD-10-CM

## 2019-07-22 PROCEDURE — 99024 POSTOP FOLLOW-UP VISIT: CPT | Performed by: NURSE PRACTITIONER

## 2019-07-22 NOTE — PROGRESS NOTES
Subjective   Chief Complaint   Patient presents with   • Wound Check     Patient is here for a wound check.        Patient ID: Irwin Esparza is a 48 y.o. male who is here for follow-up having had Coronary artery bypass grafting-4 vessel (left internal mammary artery/left anterior descending, sequential reverse saphenous vein graft/acute marginal and posterior descending artery, and reverse saphenous vein graft/first obtuse marginal), Transmyocardial revascularization- 57 channels, Right endoscopic vein harvest with open extension performed on 6/28/2019 by Dr. Monzon.     History of Present Illness  Post operative recovery was uneventful without any major complications. He presents to the office today with report of drainage from saphenectomy site. States drainage started Friday night. He bumped leg on table and then noticed 2 areas draining. States site had some redness around it but it has since improved. Has been keeping site clean and dry with dial soap and water. Sleep habits are good. Pain control has been good. No fevers/sweats/chills. No drainage from sternal incision.  No sternal clicks. No chest pain or shortness of breath. Appetite is good. Has been following vegan diet. Has not smoked since hospital discharge! Ambulating multiple times daily. Eager to drive soon. Does have small lump palpable in right breast that he thinks is new. Accompanied by his girlfriend Mable.     The following portions of the patient's history were reviewed and updated as appropriate: allergies, current medications, past family history, past medical history, past social history, past surgical history and problem list.    Review of Systems   Constitutional: Negative for chills, fatigue and fever.   Respiratory: Negative for shortness of breath and wheezing.    Cardiovascular: Positive for chest pain (surgical, improving). Negative for palpitations and leg swelling.       Objective   Visit Vitals  BP 94/52 (BP Location: Left arm,  "Patient Position: Sitting, Cuff Size: Adult)   Pulse 57   Ht 170 cm (66.93\")   Wt 86.1 kg (189 lb 12.8 oz)   SpO2 98%   BMI 29.79 kg/m²       Physical Exam   Constitutional: He is oriented to person, place, and time. He appears well-developed and well-nourished. No distress.   HENT:   Head: Normocephalic and atraumatic.   Eyes: Pupils are equal, round, and reactive to light.   Neck: Normal range of motion. Neck supple.   Cardiovascular: Normal rate, regular rhythm and normal heart sounds. Exam reveals no friction rub.   No murmur heard.  Pulmonary/Chest: Effort normal and breath sounds normal. No respiratory distress. He has no wheezes. He has no rales.       Abdominal: Soft. He exhibits no distension. There is no tenderness. There is no guarding.   Musculoskeletal: He exhibits no edema.   Neurological: He is alert and oriented to person, place, and time. No cranial nerve deficit.   Skin: Skin is warm and dry. No rash noted. He is not diaphoretic. No pallor.   Sternotomy site clean, dry, and intact. Sternum stable. No clicks. Saphenectomy site clean and dry with scab present. 2 tiny areas with scab off that are draining scant amount of serous drainage. Mild pink-redness around parts of scab. No cellulitis. No warmth.     Psychiatric: He has a normal mood and affect. His behavior is normal.   Vitals reviewed.      Assessment/Plan         Irwin was seen today for wound check.    Diagnoses and all orders for this visit:    Coronary artery disease of native artery of native heart with stable angina pectoris (CMS/HCC)    Unstable angina (CMS/HCC)    Former smoker    Overweight    Wound drainage         Overall, Irwin Esparza is doing well.  We discussed current sternotomy precautions and how these will not be advanced yet. Discussed that he does not need an antibiotic as I do not feel saphenectomy site is infected. Encourage keeping site clean and dry. encouraged to scrub site more aggressively to debride. Encouraged " patient to speak to PCP regarding right breast mass. He is unsure if this is new finding or not. Discussed that I did not feel this is from sternotomy site. Provided support and encouragement. All questions have been answered to the best of my ability. Will discuss starting cardiac rehabilitation at official 1 month post op visit. Patient has follow up with Dr. Monzon in a few weeks. Patient has follow up with Cardiology in a few weeks. Patient has been instructed to contact our office with any questions or concerns should they arise prior to the next office visit. RTC in 1 week for wound recheck.     Patient's Body mass index is 29.79 kg/m². BMI is above normal parameters. Recommendations include: educational material, referral to primary care and establishing durable lifestyle changes to accomplish an acceptable weight for age and height.    He is a former smoker. We discussed the need to discontinue smoking as this reduces the overall graft patency.  I have congratulated Irwin Esparza on successful smoking cessation thus far.

## 2019-07-24 ENCOUNTER — TELEPHONE (OUTPATIENT)
Dept: CARDIAC SURGERY | Facility: CLINIC | Age: 49
End: 2019-07-24

## 2019-07-24 NOTE — TELEPHONE ENCOUNTER
Mable Harper calling re: scripts for pt.  Know[facility] but the [facility] says they have never rec'd this.  Calling to request these meds to be [facility] to the [facility] today.  Pt only has a week of meds left and needs to be able to get them from the [facility] with his VA benefits now instead of at the regular pharm (has 1 refill at reg pharm but [facility]).  Needs Lisinopril 2.5mg QD, Atorvastatin 40mg QD and Plavix 75mg QD.  The Plavix is a new med for pt and the other 2 are dosage changes so [facility] will not fill w/o contact from our office.  [facility] reach [facility] at #[phone].  Ms. Harper requests a call back to let her know when this is done so she can contact the [facility] re: when they will be ready to ./elizabeth

## 2019-07-26 ENCOUNTER — READMISSION MANAGEMENT (OUTPATIENT)
Dept: CALL CENTER | Facility: HOSPITAL | Age: 49
End: 2019-07-26

## 2019-07-26 NOTE — OUTREACH NOTE
CT Surgery Week 4 Survey      Responses   Facility patient discharged from?  [place]   Does the patient have one of the following disease processes/diagnoses(primary or secondary)?  Cardiothoracic surgery   Week 4 attempt successful?  Yes   Call start time  1246   Call end time  1254   Discharge diagnosis  CABGX4 with LIMA, EVH/RLE   Meds reviewed with patient/caregiver?  Yes   Is the patient taking all medications as directed (includes completed medication regime)?  Yes   Medication comments  Pt not needing pain meds.    Has the patient kept scheduled appointments due by today?  Yes   Is the patient still receiving Home Health Services?  N/A   Psychosocial issues?  No   Comments  Changed diet to include more fresh fruit/veggies, whole grains and lean meats. Incisions healing well.    What is the patient's perception of their health status since discharge?  Improving   Nursing interventions  Nurse provided patient education   Is the patient/caregiver able to teach back steps to recovery at home?  Set small, achievable goals for return to baseline health, Weigh daily, Eat a well-balance diet   If the patient is a current smoker, are they able to teach back resources for cessation?  -- [Has not smoked since DC. ]   Is the patient/caregiver able to teach back the hierarchy of who to call/visit for symptoms/problems? PCP, Specialist, Home health nurse, Urgent Care, ED, 911  Yes   Week 4 Call Completed?  Yes   Would the patient like one additional call?  No   Graduated  Yes   Was the number of calls appropriate?  Yes          Mary Jones RN

## 2019-07-30 ENCOUNTER — TELEPHONE (OUTPATIENT)
Dept: CARDIAC SURGERY | Facility: CLINIC | Age: 49
End: 2019-07-30

## 2019-07-30 NOTE — TELEPHONE ENCOUNTER
Mable Harper calling.  [facility] got 2 of the 3 meds sent in last week.  They did not get the Plavix rx.  Can this be sent in again?  They gave her a different fax# for this.  Fax# [phone]/elizabeth

## 2019-07-31 ENCOUNTER — OFFICE VISIT (OUTPATIENT)
Dept: CARDIAC SURGERY | Facility: CLINIC | Age: 49
End: 2019-07-31

## 2019-07-31 ENCOUNTER — APPOINTMENT (OUTPATIENT)
Dept: CARDIAC REHAB | Facility: HOSPITAL | Age: 49
End: 2019-07-31

## 2019-07-31 ENCOUNTER — TELEPHONE (OUTPATIENT)
Dept: CARDIAC REHAB | Facility: HOSPITAL | Age: 49
End: 2019-07-31

## 2019-07-31 VITALS
WEIGHT: 191.2 LBS | BODY MASS INDEX: 30.01 KG/M2 | HEIGHT: 67 IN | DIASTOLIC BLOOD PRESSURE: 64 MMHG | OXYGEN SATURATION: 99 % | SYSTOLIC BLOOD PRESSURE: 112 MMHG | HEART RATE: 54 BPM

## 2019-07-31 DIAGNOSIS — Z87.891 FORMER SMOKER: ICD-10-CM

## 2019-07-31 DIAGNOSIS — I25.119 CORONARY ARTERY DISEASE INVOLVING NATIVE CORONARY ARTERY OF NATIVE HEART WITH ANGINA PECTORIS (HCC): Primary | ICD-10-CM

## 2019-07-31 DIAGNOSIS — E66.9 OBESITY (BMI 30-39.9): ICD-10-CM

## 2019-07-31 PROCEDURE — 99024 POSTOP FOLLOW-UP VISIT: CPT | Performed by: NURSE PRACTITIONER

## 2019-07-31 RX ORDER — ATORVASTATIN CALCIUM 40 MG/1
40 TABLET, FILM COATED ORAL NIGHTLY
Qty: 30 TABLET | Refills: 0 | Status: SHIPPED | OUTPATIENT
Start: 2019-07-31 | End: 2019-08-16 | Stop reason: ALTCHOICE

## 2019-07-31 NOTE — TELEPHONE ENCOUNTER
Dr. Monzon,    Mr. Esparza is scheduled to start his Cardiac Rehab on Monday, Aug.5. His ambulatory referral is ready for you to sign, please.    Thank you.    Buffy Lewis RN

## 2019-07-31 NOTE — PROGRESS NOTES
"Subjective   Chief Complaint   Patient presents with   • Post-op Follow-up     CABG x4 on 6/28     Patient ID: Irwin Esparza is a 48 y.o. male who is here for follow-up having had Coronary artery bypass grafting-4 vessel (left internal mammary artery/left anterior descending, sequential reverse saphenous vein graft/acute marginal and posterior descending artery, and reverse saphenous vein graft/first obtuse marginal), Transmyocardial revascularization- 57 channels, Right endoscopic vein harvest with open extension performed on 6/28/2019 by Dr. Monzon.     History of Present Illness  Post operative recovery was uneventful without any major complications. He returns today for follow up. Reports saphenectomy site has improved and scabbed over. Has been keeping site clean and dry with dial soap and water. Sleep habits are good. Pain control has been good. No fevers/sweats/chills. No drainage from sternal incision.  No sternal clicks. No chest pain or shortness of breath. Appetite is good. Has been following vegan diet. Has not smoked since hospital discharge! Ambulating multiple times daily. Accompanied by his girlfriend Mable. [facility] as [facility]. [facility] not seen [facility] to discuss overall health nor previously reported small right breast lump.     The following portions of the patient's history were reviewed and updated as appropriate: allergies, current medications, past family history, past medical history, past social history, past surgical history and problem list.    Review of Systems   Constitutional: Negative for chills, fatigue and fever.   Respiratory: Negative for shortness of breath and wheezing.    Cardiovascular: Positive for chest pain (surgical, improving). Negative for palpitations and leg swelling.       Objective   Visit Vitals  /64 (BP Location: Left arm, Patient Position: Sitting, Cuff Size: Adult)   Pulse 54   Ht 170 cm (66.93\")   Wt " 86.7 kg (191 lb 3.2 oz)   SpO2 99%   BMI 30.01 kg/m²       Physical Exam   Constitutional: He is oriented to person, place, and time. He appears well-developed and well-nourished. No distress.   HENT:   Head: Normocephalic and atraumatic.   Eyes: Pupils are equal, round, and reactive to light.   Neck: Normal range of motion. Neck supple.   Cardiovascular: Normal rate, regular rhythm and normal heart sounds. Exam reveals no friction rub.   No murmur heard.  Pulmonary/Chest: Effort normal and breath sounds normal. No respiratory distress. He has no wheezes. He has no rales.   Abdominal: Soft. He exhibits no distension. There is no tenderness. There is no guarding.   Musculoskeletal: He exhibits no edema.   Neurological: He is alert and oriented to person, place, and time.   Skin: Skin is warm and dry. No rash noted. He is not diaphoretic. No pallor.   Sternotomy site clean, dry, and intact. Sternum stable. No clicks.  Saphenectomy site clean, dry, and intact with few scabs remaining. Overall looks better. No evidence of infection.    Psychiatric: He has a normal mood and affect. His behavior is normal.   Vitals reviewed.      Assessment/Plan         Irwin was seen today for post-op follow-up.    Diagnoses and all orders for this visit:    Coronary artery disease involving native coronary artery of native heart with angina pectoris (CMS/HCC)    Former smoker    Obesity (BMI 30-39.9)    Other orders  -     atorvastatin (LIPITOR) 40 MG tablet; Take 1 tablet by mouth Every Night.         Overall, Irwin Esparza is doing well.  Saphenectomy site is healing. Few scabs remain but are resolving. We discussed current sternotomy precautions and how these will not be advanced yet. Again, encouraged patient to speak to PCP regarding right breast mass. Provided support and encouragement. All questions have been answered to the best of my ability. Will discuss starting cardiac rehabilitation at official 1 month post op visit.  Patient has follow up with Dr. Monzon in a few weeks. Patient has follow up with Cardiology in a few weeks. Patient has been instructed to contact our office with any questions or concerns should they arise prior to the next office visit.      Patient's Body mass index is 30.01 kg/m². BMI is above normal parameters. Recommendations include: educational material, referral to primary care and establishing durable lifestyle changes to accomplish an acceptable weight for age and height.    He is a former smoker. We discussed the need to discontinue smoking as this reduces the overall graft patency.  I have congratulated Irwin Esparza on successful smoking cessation thus far.

## 2019-08-05 ENCOUNTER — APPOINTMENT (OUTPATIENT)
Dept: CARDIAC REHAB | Facility: HOSPITAL | Age: 49
End: 2019-08-05

## 2019-08-07 ENCOUNTER — OFFICE VISIT (OUTPATIENT)
Dept: CARDIAC SURGERY | Facility: CLINIC | Age: 49
End: 2019-08-07

## 2019-08-07 VITALS
OXYGEN SATURATION: 98 % | HEIGHT: 67 IN | SYSTOLIC BLOOD PRESSURE: 102 MMHG | BODY MASS INDEX: 30.42 KG/M2 | HEART RATE: 54 BPM | WEIGHT: 193.8 LBS | DIASTOLIC BLOOD PRESSURE: 62 MMHG

## 2019-08-07 DIAGNOSIS — I25.10 CORONARY ARTERY DISEASE INVOLVING NATIVE CORONARY ARTERY OF NATIVE HEART WITHOUT ANGINA PECTORIS: ICD-10-CM

## 2019-08-07 DIAGNOSIS — I25.118 CORONARY ARTERY DISEASE INVOLVING NATIVE CORONARY ARTERY OF NATIVE HEART WITH OTHER FORM OF ANGINA PECTORIS (HCC): Primary | ICD-10-CM

## 2019-08-07 DIAGNOSIS — Z87.891 FORMER SMOKER: ICD-10-CM

## 2019-08-07 PROCEDURE — 99024 POSTOP FOLLOW-UP VISIT: CPT | Performed by: THORACIC SURGERY (CARDIOTHORACIC VASCULAR SURGERY)

## 2019-08-07 RX ORDER — LISINOPRIL 2.5 MG/1
2.5 TABLET ORAL DAILY
Qty: 30 TABLET | Refills: 2 | Status: SHIPPED | OUTPATIENT
Start: 2019-08-07 | End: 2019-08-14 | Stop reason: SDUPTHER

## 2019-08-07 RX ORDER — METOPROLOL TARTRATE 50 MG/1
50 TABLET, FILM COATED ORAL 2 TIMES DAILY
Qty: 60 TABLET | Refills: 2 | Status: SHIPPED | OUTPATIENT
Start: 2019-08-07 | End: 2019-08-14 | Stop reason: SDUPTHER

## 2019-08-14 ENCOUNTER — DOCUMENTATION (OUTPATIENT)
Dept: CARDIOLOGY | Facility: CLINIC | Age: 49
End: 2019-08-14

## 2019-08-14 ENCOUNTER — OFFICE VISIT (OUTPATIENT)
Dept: CARDIOLOGY | Facility: CLINIC | Age: 49
End: 2019-08-14

## 2019-08-14 ENCOUNTER — TELEPHONE (OUTPATIENT)
Dept: CARDIAC SURGERY | Facility: CLINIC | Age: 49
End: 2019-08-14

## 2019-08-14 VITALS
SYSTOLIC BLOOD PRESSURE: 126 MMHG | HEIGHT: 65 IN | HEART RATE: 60 BPM | WEIGHT: 195 LBS | BODY MASS INDEX: 32.49 KG/M2 | DIASTOLIC BLOOD PRESSURE: 68 MMHG

## 2019-08-14 DIAGNOSIS — I25.10 CORONARY ARTERY DISEASE INVOLVING NATIVE CORONARY ARTERY OF NATIVE HEART WITHOUT ANGINA PECTORIS: Primary | ICD-10-CM

## 2019-08-14 DIAGNOSIS — E78.2 MIXED HYPERLIPIDEMIA: ICD-10-CM

## 2019-08-14 DIAGNOSIS — I10 ESSENTIAL HYPERTENSION: ICD-10-CM

## 2019-08-14 PROBLEM — R94.39 ABNORMAL STRESS TEST: Status: RESOLVED | Noted: 2019-06-14 | Resolved: 2019-08-14

## 2019-08-14 PROBLEM — I20.0 UNSTABLE ANGINA: Status: RESOLVED | Noted: 2019-06-13 | Resolved: 2019-08-14

## 2019-08-14 PROBLEM — I25.118 CORONARY ARTERY DISEASE OF NATIVE ARTERY OF NATIVE HEART WITH STABLE ANGINA PECTORIS: Status: RESOLVED | Noted: 2019-06-27 | Resolved: 2019-08-14

## 2019-08-14 PROCEDURE — 99214 OFFICE O/P EST MOD 30 MIN: CPT | Performed by: INTERNAL MEDICINE

## 2019-08-14 PROCEDURE — 93000 ELECTROCARDIOGRAM COMPLETE: CPT | Performed by: INTERNAL MEDICINE

## 2019-08-14 RX ORDER — LISINOPRIL 2.5 MG/1
2.5 TABLET ORAL DAILY
Qty: 30 TABLET | Refills: 2 | Status: SHIPPED | OUTPATIENT
Start: 2019-08-14 | End: 2019-08-14 | Stop reason: SDUPTHER

## 2019-08-14 RX ORDER — ATORVASTATIN CALCIUM 80 MG/1
80 TABLET, FILM COATED ORAL DAILY
COMMUNITY

## 2019-08-14 RX ORDER — METOPROLOL TARTRATE 50 MG/1
50 TABLET, FILM COATED ORAL 2 TIMES DAILY
Qty: 60 TABLET | Refills: 2 | Status: SHIPPED | OUTPATIENT
Start: 2019-08-14 | End: 2019-09-18 | Stop reason: SDUPTHER

## 2019-08-14 RX ORDER — LISINOPRIL 2.5 MG/1
2.5 TABLET ORAL DAILY
Qty: 30 TABLET | Refills: 2 | Status: SHIPPED | OUTPATIENT
Start: 2019-08-14 | End: 2019-09-18 | Stop reason: SDUPTHER

## 2019-08-14 RX ORDER — METOPROLOL TARTRATE 50 MG/1
50 TABLET, FILM COATED ORAL 2 TIMES DAILY
Qty: 60 TABLET | Refills: 2 | Status: SHIPPED | OUTPATIENT
Start: 2019-08-14 | End: 2019-08-14 | Stop reason: SDUPTHER

## 2019-08-14 NOTE — PROGRESS NOTES
Subjective:     Encounter Date: 08/14/2019      Patient ID: Irwin Esparza is a 48 y.o. male.    Chief Complaint: f/u s/p CABG  History of Present Illness  48-year-old smoker with family history of coronary disease, also with hypertension, who presented to the emergency department  6/13/19 with severe fatigue and intermittent chest discomfort for a few days.  Discomfort would last for 5 to 10 minutes, without any other associated symptoms besides the persistent fatigue.  Pain was described as sharp and left-sided, but did respond to his mother's nitroglycerin.  Pain did not recur since admission.  Troponins were negative.  Stress test was abnormal, so I performed cardiac catheterization which revealed multivessel disease (see report below).  Since he remained pain-free and had been troponin-negative, he was felt stable for discharge to return for CABG as an outpatient with a surgeon of his choice.  He underwent four-vessel CABG on 6/20/2019 with Dr. Monzon, and had a very speedy inpatient recovery, being discharged home on 7/1/2019.  He returns to see me today for his first outpatient follow-up thereafter.    Since discharge, he has done great.  No recurrence of chest pain.  He has quit smoking. Starting cardiac rehab this Friday.    The following portions of the patient's history were reviewed and updated as appropriate: allergies, current medications, past family history, past medical history, past social history, past surgical history and problem list.    Review of Systems   Constitution: Negative for malaise/fatigue.   Cardiovascular: Negative for chest pain, claudication, dyspnea on exertion, leg swelling, near-syncope, orthopnea, palpitations, paroxysmal nocturnal dyspnea and syncope.   Respiratory: Negative for shortness of breath.    Hematologic/Lymphatic: Does not bruise/bleed easily.       Current Outpatient Medications:   •  acetaminophen (TYLENOL) 325 MG tablet, Take 650 mg by mouth Every 4 (Four)  Hours As Needed for Mild Pain ., Disp: , Rfl:   •  aspirin 81 MG chewable tablet, Chew 1 tablet Daily., Disp: 30 tablet, Rfl: 0  •  atorvastatin (LIPITOR) 80 MG tablet, Take 80 mg by mouth Daily. Take 1/2 tablet by mouth every morning, Disp: , Rfl:   •  carboxymethylcellulose (REFRESH PLUS) 0.5 % solution, Administer 1 drop to both eyes As Needed., Disp: , Rfl:   •  Cholecalciferol 2000 units tablet, Take 1 tablet by mouth 2 (Two) Times a Day., Disp: , Rfl:   •  clopidogrel (PLAVIX) 75 MG tablet, Take 1 tablet by mouth Daily., Disp: 30 tablet, Rfl: 2  •  DICLOFENAC PO, Take 75 mg by mouth 2 (Two) Times a Day., Disp: , Rfl:   •  lisinopril (PRINIVIL,ZESTRIL) 2.5 MG tablet, Take 1 tablet by mouth Daily., Disp: 30 tablet, Rfl: 2  •  metoprolol tartrate (LOPRESSOR) 50 MG tablet, Take 1 tablet by mouth 2 (Two) Times a Day., Disp: 60 tablet, Rfl: 2  •  nitroglycerin (NITROSTAT) 0.4 MG SL tablet, Place 1 tablet under the tongue Every 5 (Five) Minutes As Needed for Chest Pain. Take no more than 3 doses in 15 minutes., Disp: 30 tablet, Rfl: 12  •  omeprazole (priLOSEC) 20 MG capsule, Take 20 mg by mouth 2 (Two) Times a Day., Disp: , Rfl:   •  atorvastatin (LIPITOR) 40 MG tablet, Take 1 tablet by mouth Every Night., Disp: 30 tablet, Rfl: 0  •  methylphenidate (RITALIN) 5 MG tablet, Take 15 mg by mouth Every 12 (Twelve) Hours., Disp: , Rfl:   •  nicotine (NICODERM CQ) 21 MG/24HR patch, Place 1 patch on the skin as directed by provider Daily., Disp: , Rfl:   •  oxyCODONE-acetaminophen (PERCOCET) 5-325 MG per tablet, Take 1 tablet by mouth Every 6 (Six) Hours As Needed (pain)., Disp: 30 tablet, Rfl: 0       Objective:      Vitals:    08/14/19 1342   BP: 126/68   Pulse: 60     Physical Exam   Constitutional: He is oriented to person, place, and time. He appears well-developed and well-nourished.   Neck: No JVD present.   Cardiovascular: Normal rate, regular rhythm, normal heart sounds and intact distal pulses.   No murmur  heard.  Pulmonary/Chest: Effort normal and breath sounds normal.   Musculoskeletal: He exhibits no edema.   Neurological: He is alert and oriented to person, place, and time.   Skin: Skin is warm and dry.       Lab Review:         ECG 12 Lead  Date/Time: 8/14/2019 2:14 PM  Performed by: Satinder Ahumada MD  Authorized by: Satinder Ahumada MD   Comparison: compared with previous ECG from 6/30/2019  Similar to previous ECG  Rhythm: sinus rhythm  Q waves: III, aVF and II      Clinical impression: abnormal EKG            Results for orders placed during the hospital encounter of 06/28/19   Intra-Op DOUGLAS    Narrative · Unremarkable intraoperative transesophageal echocardiogram.  · Left ventricular function appeared normal at the beginning of the case   and remained preserved on final images.  · No significant valvular abnormalities noted.          Cath report:      Labs:  Lab Results   Component Value Date    CHOL 159 06/14/2019    CHOL 167 07/11/2018     Lab Results   Component Value Date    TRIG 316 (H) 06/14/2019    TRIG 399 (H) 07/11/2018     Lab Results   Component Value Date    HDL 22 (L) 06/14/2019    HDL 27 (L) 07/11/2018     Lab Results   Component Value Date     (H) 06/14/2019     (H) 07/11/2018     No results found for: VLDL  Lab Results   Component Value Date    LDLHDL 3.35 06/14/2019    LDLHDL 2.23 07/11/2018       Assessment/Plan:     Problem List Items Addressed This Visit (all established, stable problems)       Cardiovascular and Mediastinum    HTN (hypertension)    Hyperlipidemia    Relevant Medications    atorvastatin (LIPITOR) 80 MG tablet    Coronary artery disease involving native coronary artery of native heart without angina pectoris - Primary    Overview     4v CABG 6/28/19 (initial presentation: UA; abnl stress-> cath with multivessel CAD). LIMA->LAD, SVG->acute marginal->PDA (sequential), and SVG->OM.  Also had 57 channel TMR.             Recommendations/plans:    Continue all  meds:  -aspirin and Plavix since he presented with non-STEMI, with planned duration of treatment 12 months from presentation in June  -Continue high intensity atorvastatin (40mg) with plan to repeat lipid panel by October.  Goal LDL less than 70.  If this is not achieved on current dose atorvastatin, we need to initiate PCSK9 inhibitor therapy.  -Continue ACE inhibitor and beta-blocker    -Congratulated on smoking cessation  -Complete cardiac rehab     f/u in 6 months    Satinder Ahumada MD  08/14/2019  2:14 PM

## 2019-08-14 NOTE — TELEPHONE ENCOUNTER
Mable wheeler.  Pt was seen recently [facility].  States they have never gotten these meds to pt.  Can these scripts be sent to [facility] instead?/elizabeth

## 2019-08-16 ENCOUNTER — TREATMENT (OUTPATIENT)
Dept: CARDIAC REHAB | Facility: HOSPITAL | Age: 49
End: 2019-08-16

## 2019-08-16 DIAGNOSIS — Z95.1 S/P CABG X 4: Primary | ICD-10-CM

## 2019-08-16 PROCEDURE — 93798 PHYS/QHP OP CAR RHAB W/ECG: CPT

## 2019-08-19 ENCOUNTER — TREATMENT (OUTPATIENT)
Dept: CARDIAC REHAB | Facility: HOSPITAL | Age: 49
End: 2019-08-19

## 2019-08-19 DIAGNOSIS — Z95.1 S/P CABG X 4: Primary | ICD-10-CM

## 2019-08-19 PROCEDURE — 93798 PHYS/QHP OP CAR RHAB W/ECG: CPT

## 2019-08-21 ENCOUNTER — TREATMENT (OUTPATIENT)
Dept: CARDIAC REHAB | Facility: HOSPITAL | Age: 49
End: 2019-08-21

## 2019-08-21 DIAGNOSIS — Z95.1 S/P CABG X 4: Primary | ICD-10-CM

## 2019-08-21 PROCEDURE — 93798 PHYS/QHP OP CAR RHAB W/ECG: CPT

## 2019-08-23 ENCOUNTER — TREATMENT (OUTPATIENT)
Dept: CARDIAC REHAB | Facility: HOSPITAL | Age: 49
End: 2019-08-23

## 2019-08-23 DIAGNOSIS — Z95.1 S/P CABG X 4: Primary | ICD-10-CM

## 2019-08-23 PROCEDURE — 93798 PHYS/QHP OP CAR RHAB W/ECG: CPT

## 2019-08-26 NOTE — PROGRESS NOTES
"Subjective   Chief Complaint   Patient presents with   • Post-op Follow-up     CABG x4 on 6/28     Patient ID: Irwin Esparza is a 48 y.o. male who is here for follow-up having had Coronary artery bypass grafting-4 vessel (left internal mammary artery/left anterior descending, sequential reverse saphenous vein graft/acute marginal and posterior descending artery, and reverse saphenous vein graft/first obtuse marginal), Transmyocardial revascularization- 57 channels, Right endoscopic vein harvest with open extension performed on 6/28/2019     History of Present Illness  Post operative recovery was uneventful without any major complications. He returns today for follow up. He had some early concerns as to his saphenectomy incision sites.  They are healing fine.  No fevers, sweats, or chills.  Sleep habits are good. Pain control has been good. No drainage from sternal incision.  No sternal clicks. No chest pain or shortness of breath. Appetite is good. He has deviated from a pure vegan diet but is still working to eat a strongly heart healthy diet.  He is selective about chicken and trying to eat fish.   Has not smoked since hospital discharge! [facility] not seen [facility] to discuss overall health nor previously reported small right breast lump.     The following portions of the patient's history were reviewed and updated as appropriate: allergies, current medications, past family history, past medical history, past social history, past surgical history and problem list.      Objective   Visit Vitals  /62 (BP Location: Left arm, Patient Position: Sitting, Cuff Size: Adult)   Pulse 54   Ht 170 cm (66.93\")   Wt 87.9 kg (193 lb 12.8 oz)   SpO2 98%   BMI 30.42 kg/m²       Physical Exam   Constitutional: He is oriented to person, place, and time. He appears well-developed and well-nourished. No distress.   HENT:   Head: Normocephalic and atraumatic.   Eyes: Pupils are equal, round, and reactive to light.   Neck: Normal range " of motion. Neck supple.   Cardiovascular: Normal rate, regular rhythm and normal heart sounds. Exam reveals no friction rub.   No murmur heard.  Pulmonary/Chest: Effort normal and breath sounds normal. No respiratory distress. He has no wheezes. He has no rales.   The sternum is stable. No clicks.  The sternotomy incision is C/D/I.       Abdominal: Soft. He exhibits no distension. There is no tenderness. There is no guarding.   Musculoskeletal: He exhibits no edema.   Saphenectomy site clean, dry, and intact with a scab on each side of the incision.  No evidence of infection.    Neurological: He is alert and oriented to person, place, and time.   Skin: Skin is warm and dry. No rash noted. He is not diaphoretic. No pallor.   Psychiatric: He has a normal mood and affect. His behavior is normal.   Vitals reviewed.      Assessment/Plan         Irwin was seen today for post-op follow-up.    Diagnoses and all orders for this visit:    Coronary artery disease involving native coronary artery of native heart with other form of angina pectoris (CMS/[facility])  -     Ambulatory Referral to Cardiac Rehab    Former smoker    Coronary artery disease involving native coronary artery of native heart without angina pectoris    Other orders  -     Discontinue: lisinopril (PRINIVIL,ZESTRIL) 2.5 MG tablet; Take 1 tablet by mouth Daily.  -     Discontinue: metoprolol tartrate (LOPRESSOR) 50 MG tablet; Take 1 tablet by mouth 2 (Two) Times a Day.         Overall, Irwin Esparza is doing well.  We discussed current sternotomy precautions and how these will advance over the next month.  Again, encouraged patient to speak to PCP regarding right breast mass. Provided support and encouragement. All questions have been answered to the best of my ability. Will discuss he is ready to start cardiac rehab.  Patient has follow up with Cardiology.  He is in need for lopressor and lisinopril in paper rx.  This has been rx'ed.  RIsk factor modification  has been strongly encouraged.  It is very good to note that he has made very positive changes with exercise, diet, and smoking cessation.  We discussed this is an evolving process.  He has been instructed to contact our office with any questions or concerns should they arise prior to the next office visit.      Patient's Body mass index is 30.42 kg/m². BMI is above normal parameters. Recommendations include: educational material, referral to primary care and establishing durable lifestyle changes to accomplish an acceptable weight for age and height.    He is a former smoker. We discussed the need to remain a non-smoking as this reduces the overall graft patency.  I have congratulated Irwin Esparza on successful smoking cessation thus far.   RTC 4 weeks

## 2019-08-28 ENCOUNTER — TREATMENT (OUTPATIENT)
Dept: CARDIAC REHAB | Facility: HOSPITAL | Age: 49
End: 2019-08-28

## 2019-08-28 DIAGNOSIS — Z95.1 S/P CABG X 4: Primary | ICD-10-CM

## 2019-08-28 PROCEDURE — 93798 PHYS/QHP OP CAR RHAB W/ECG: CPT

## 2019-08-30 ENCOUNTER — TREATMENT (OUTPATIENT)
Dept: CARDIAC REHAB | Facility: HOSPITAL | Age: 49
End: 2019-08-30

## 2019-08-30 DIAGNOSIS — Z95.1 S/P CABG X 4: Primary | ICD-10-CM

## 2019-08-30 PROCEDURE — 93798 PHYS/QHP OP CAR RHAB W/ECG: CPT

## 2019-09-03 RX ORDER — CLOPIDOGREL BISULFATE 75 MG/1
TABLET ORAL
Qty: 90 TABLET | Refills: 2 | OUTPATIENT
Start: 2019-09-03

## 2019-09-04 ENCOUNTER — TREATMENT (OUTPATIENT)
Dept: CARDIAC REHAB | Facility: HOSPITAL | Age: 49
End: 2019-09-04

## 2019-09-04 DIAGNOSIS — Z95.1 S/P CABG X 4: Primary | ICD-10-CM

## 2019-09-04 PROCEDURE — 93798 PHYS/QHP OP CAR RHAB W/ECG: CPT

## 2019-09-09 ENCOUNTER — TREATMENT (OUTPATIENT)
Dept: CARDIAC REHAB | Facility: HOSPITAL | Age: 49
End: 2019-09-09

## 2019-09-09 DIAGNOSIS — Z95.1 S/P CABG X 4: Primary | ICD-10-CM

## 2019-09-09 PROCEDURE — 93798 PHYS/QHP OP CAR RHAB W/ECG: CPT

## 2019-09-11 ENCOUNTER — TREATMENT (OUTPATIENT)
Dept: CARDIAC REHAB | Facility: HOSPITAL | Age: 49
End: 2019-09-11

## 2019-09-11 DIAGNOSIS — Z95.1 S/P CABG X 4: Primary | ICD-10-CM

## 2019-09-11 PROCEDURE — 93798 PHYS/QHP OP CAR RHAB W/ECG: CPT

## 2019-09-16 ENCOUNTER — TREATMENT (OUTPATIENT)
Dept: CARDIAC REHAB | Facility: HOSPITAL | Age: 49
End: 2019-09-16

## 2019-09-16 DIAGNOSIS — Z95.1 S/P CABG X 4: Primary | ICD-10-CM

## 2019-09-16 PROCEDURE — 93798 PHYS/QHP OP CAR RHAB W/ECG: CPT

## 2019-09-18 RX ORDER — METOPROLOL TARTRATE 50 MG/1
50 TABLET, FILM COATED ORAL 2 TIMES DAILY
Qty: 60 TABLET | Refills: 4 | Status: SHIPPED | OUTPATIENT
Start: 2019-09-18 | End: 2019-09-18 | Stop reason: SDUPTHER

## 2019-09-18 RX ORDER — LISINOPRIL 2.5 MG/1
2.5 TABLET ORAL DAILY
Qty: 30 TABLET | Refills: 4 | Status: SHIPPED | OUTPATIENT
Start: 2019-09-18 | End: 2019-09-18 | Stop reason: SDUPTHER

## 2019-09-18 RX ORDER — CLOPIDOGREL BISULFATE 75 MG/1
TABLET ORAL
Qty: 30 TABLET | Refills: 0 | Status: CANCELLED | OUTPATIENT
Start: 2019-09-18

## 2019-09-18 RX ORDER — CLOPIDOGREL BISULFATE 75 MG/1
75 TABLET ORAL DAILY
Qty: 30 TABLET | Refills: 4 | Status: SHIPPED | OUTPATIENT
Start: 2019-09-18 | End: 2019-09-18 | Stop reason: SDUPTHER

## 2019-09-18 RX ORDER — CLOPIDOGREL BISULFATE 75 MG/1
75 TABLET ORAL DAILY
Qty: 30 TABLET | Refills: 4 | Status: SHIPPED | OUTPATIENT
Start: 2019-09-18 | End: 2020-11-06

## 2019-09-18 RX ORDER — LISINOPRIL 2.5 MG/1
2.5 TABLET ORAL DAILY
Qty: 30 TABLET | Refills: 4 | Status: SHIPPED | OUTPATIENT
Start: 2019-09-18 | End: 2021-01-06 | Stop reason: SDUPTHER

## 2019-09-18 RX ORDER — METOPROLOL TARTRATE 50 MG/1
50 TABLET, FILM COATED ORAL 2 TIMES DAILY
Qty: 60 TABLET | Refills: 4 | Status: SHIPPED | OUTPATIENT
Start: 2019-09-18 | End: 2021-01-06

## 2019-09-18 NOTE — TELEPHONE ENCOUNTER
Mess left at the number for Mable re: this.  Also called the number she left to reach the pt and Mr Esparza was informed of this and voiced understanding/kahm

## 2019-09-18 NOTE — TELEPHONE ENCOUNTER
Mable Harper calling for refill of Plavix.  Appt was changed to 10-2-19 and pt will run out of med before then.  Pt uses [facility]/elizabeth

## 2019-10-02 ENCOUNTER — OFFICE VISIT (OUTPATIENT)
Dept: CARDIAC SURGERY | Facility: CLINIC | Age: 49
End: 2019-10-02

## 2019-10-02 VITALS
BODY MASS INDEX: 34.12 KG/M2 | SYSTOLIC BLOOD PRESSURE: 122 MMHG | HEART RATE: 69 BPM | HEIGHT: 65 IN | DIASTOLIC BLOOD PRESSURE: 68 MMHG | WEIGHT: 204.8 LBS | OXYGEN SATURATION: 98 %

## 2019-10-02 DIAGNOSIS — Z87.891 FORMER SMOKER: ICD-10-CM

## 2019-10-02 DIAGNOSIS — E66.9 OBESITY (BMI 30-39.9): ICD-10-CM

## 2019-10-02 DIAGNOSIS — I25.10 CORONARY ARTERY DISEASE INVOLVING NATIVE CORONARY ARTERY OF NATIVE HEART WITHOUT ANGINA PECTORIS: Primary | ICD-10-CM

## 2019-10-02 PROCEDURE — 99213 OFFICE O/P EST LOW 20 MIN: CPT | Performed by: THORACIC SURGERY (CARDIOTHORACIC VASCULAR SURGERY)

## 2019-10-09 ENCOUNTER — APPOINTMENT (OUTPATIENT)
Dept: CARDIAC REHAB | Facility: HOSPITAL | Age: 49
End: 2019-10-09

## 2019-10-11 ENCOUNTER — APPOINTMENT (OUTPATIENT)
Dept: CARDIAC REHAB | Facility: HOSPITAL | Age: 49
End: 2019-10-11

## 2019-10-14 ENCOUNTER — APPOINTMENT (OUTPATIENT)
Dept: CARDIAC REHAB | Facility: HOSPITAL | Age: 49
End: 2019-10-14

## 2019-10-16 ENCOUNTER — APPOINTMENT (OUTPATIENT)
Dept: CARDIAC REHAB | Facility: HOSPITAL | Age: 49
End: 2019-10-16

## 2019-10-17 NOTE — PROGRESS NOTES
"Subjective   Chief Complaint   Patient presents with   • Post-op Follow-up     CABG x4 on 6/28     Patient ID: Irwin Esparza is a 48 y.o. male who is here for follow-up having had Coronary artery bypass grafting-4 vessel (left internal mammary artery/left anterior descending, sequential reverse saphenous vein graft/acute marginal and posterior descending artery, and reverse saphenous vein graft/first obtuse marginal), Transmyocardial revascularization- 57 channels, Right endoscopic vein harvest with open extension performed on 6/28/2019     History of Present Illness  Post operative recovery was uneventful without any major complications. He had some early concerns as to his saphenectomy incision sites.  They are healing fine at this time.  No fevers, sweats, or chills.  Sleep habits are good. Pain control has been good. No drainage from sternal incision.  No sternal clicks. No chest pain or shortness of breath. Eating well.  He is still adhering to a a strong heart healthy diet.  Has not smoked since hospital discharge! He has had [facility] and [facility].  He still has not been seen by [facility] in regards to the breast lesion.      The following portions of the patient's history were reviewed and updated as appropriate: allergies, current medications, past family history, past medical history, past social history, past surgical history and problem list.      Objective   Visit Vitals  /68 (BP Location: Left arm, Patient Position: Sitting, Cuff Size: Adult)   Pulse 69   Ht 165.1 cm (65\")   Wt 92.9 kg (204 lb 12.8 oz)   SpO2 98%   BMI 34.08 kg/m²       Physical Exam   Constitutional: He is oriented to person, place, and time. He appears well-developed and well-nourished. No distress.   HENT:   Head: Normocephalic and atraumatic.   Eyes: Pupils are equal, round, and reactive to light.   Neck: Normal range of motion. Neck supple.   Cardiovascular: Normal rate, regular rhythm and normal " heart sounds. Exam reveals no friction rub.   No murmur heard.  Pulmonary/Chest: Effort normal and breath sounds normal. No respiratory distress. He has no wheezes. He has no rales.   The sternum is stable. No clicks.  The sternotomy incision is well healed.     Abdominal: Soft. He exhibits no distension. There is no tenderness. There is no guarding.   Musculoskeletal: He exhibits no edema.   Saphenectomy site clean, dry, and intact with a small scab on distal end of incision present.  No evidence of infection.    Neurological: He is alert and oriented to person, place, and time.   Skin: Skin is warm and dry. No rash noted. He is not diaphoretic. No pallor.   Psychiatric: He has a normal mood and affect. His behavior is normal.   Vitals reviewed.      Assessment/Plan         Irwin was seen today for post-op follow-up.    Diagnoses and all orders for this visit:    Coronary artery disease involving native coronary artery of native heart without angina pectoris    Obesity (BMI 30-39.9)    Former smoker         Overall, Irwin Esparza is doing well.  We discussed current sternotomy precautions and how these will advance over the next few months.  Again, encouraged patient to speak to PCP regarding right breast mass to ensure taht this is not a malignancy.  It is very good to note that he has made very positive changes with exercise, diet, and smoking cessation.  We discussed this is an evolving process as I provided supportive encouragement.      Patient's Body mass index is 34.08 kg/m². BMI is above normal parameters. Recommendations include: educational material, referral to primary care and establishing durable lifestyle changes to accomplish an acceptable weight for age and height.    He is a former smoker. We discussed the need to remain a non-smoking as this reduces the overall graft patency.  I have congratulated Irwin Esparza on successful smoking cessation thus far.   Although I have not given him a  specific return to clinic appointment, should I be of further assistance, please do not hesitate to contact me.

## 2019-10-18 ENCOUNTER — APPOINTMENT (OUTPATIENT)
Dept: CARDIAC REHAB | Facility: HOSPITAL | Age: 49
End: 2019-10-18

## 2019-10-21 ENCOUNTER — APPOINTMENT (OUTPATIENT)
Dept: CARDIAC REHAB | Facility: HOSPITAL | Age: 49
End: 2019-10-21

## 2019-10-23 ENCOUNTER — APPOINTMENT (OUTPATIENT)
Dept: CARDIAC REHAB | Facility: HOSPITAL | Age: 49
End: 2019-10-23

## 2019-10-25 ENCOUNTER — APPOINTMENT (OUTPATIENT)
Dept: CARDIAC REHAB | Facility: HOSPITAL | Age: 49
End: 2019-10-25

## 2020-04-06 ENCOUNTER — TELEPHONE (OUTPATIENT)
Dept: CARDIOLOGY | Facility: CLINIC | Age: 50
End: 2020-04-06

## 2020-04-06 NOTE — TELEPHONE ENCOUNTER
Agree - does not need to be seen in office for this. Will be happy to set up telehealth visit - could do tomorrow afternoon at 1:30 or 2.  Is he willing to get [facility] and do the video visit? He's pretty tech-savvy and his fiancee is a nurse in the hospital so I'd think he'd be able to do it.  If not, we can do the telephone visit, but is better to video visit with [facility] if he will.

## 2020-04-06 NOTE — TELEPHONE ENCOUNTER
Patient given information about MyChart video visit, he has been sent the link to sign up, and appointment is scheduled for tomorrow at 2 pm.

## 2020-04-06 NOTE — TELEPHONE ENCOUNTER
Called patient to reschedule misha, he states he has been having fatigue, and possibly due to medications. That is the only symptom he has been having, stating he would like to see you, due to not being worried about it safety. I advised you were not in the office, and would be okay with a phone visit, since he does not have mychart.

## 2020-04-07 ENCOUNTER — OFFICE VISIT (OUTPATIENT)
Dept: CARDIOLOGY | Facility: CLINIC | Age: 50
End: 2020-04-07

## 2020-04-07 DIAGNOSIS — I10 ESSENTIAL HYPERTENSION: ICD-10-CM

## 2020-04-07 DIAGNOSIS — I25.10 CORONARY ARTERY DISEASE INVOLVING NATIVE CORONARY ARTERY OF NATIVE HEART WITHOUT ANGINA PECTORIS: Primary | ICD-10-CM

## 2020-04-07 DIAGNOSIS — E78.2 MIXED HYPERLIPIDEMIA: ICD-10-CM

## 2020-04-07 PROCEDURE — 99214 OFFICE O/P EST MOD 30 MIN: CPT | Performed by: INTERNAL MEDICINE

## 2020-04-07 NOTE — PROGRESS NOTES
"     Subjective:     Encounter Date: 04/07/20      Patient ID: Irwin Esparza is a 49 y.o. male.    Chief Complaint: c/o fatigue, routine f/u    History of Present Illness  49-year-old smoker with family history of coronary disease, also with hypertension, who presented to the emergency department  6/13/19 with severe fatigue and intermittent chest discomfort for a few days.  Discomfort would last for 5 to 10 minutes, without any other associated symptoms besides the persistent fatigue.  Pain was described as sharp and left-sided, but did respond to his mother's nitroglycerin.  Pain did not recur since admission.  Troponins were negative.  Stress test was abnormal, so I performed cardiac catheterization which revealed multivessel disease (see report below).  Since he remained pain-free and had been troponin-negative, he was felt stable for discharge to return for CABG as an outpatient with a surgeon of his choice.  He underwent four-vessel CABG on 6/20/2019 with Dr. Monzon, and had a very speedy inpatient recovery, being discharged home on 7/1/2019.      When I last saw him in office in Aug '19, he was recovering well and about to start cardiac rehab. No changes in therapy were made, and he was planned for 6 month f/u.  He is seen today via planned video visit, but due to connectivity issues, encounter was changed to telephone visit.    Update on chronic conditions and one new complaint:    HTN: a week or so ago, was having AM pressures up to 130/100 without associated symptoms.  More recently, has been 120-130/80s without any changes in treatment.  HL: stable; no recent labs available for review. Remains on atorva 40.  CAD: no CP, soa.  Tolerating meds.    Fatigue: states he's tired all the time; characterized as \"sleepy.\" Is admittedly not using CPAP (aggravating factor). Severity: mild.  Duration: Symptoms just started a few months ago (ie was not present every since surgery)    The following portions of the " patient's history were reviewed and updated as appropriate: allergies, current medications, past family history, past medical history, past social history, past surgical history and problem list.    Review of Systems   Constitution: Positive for malaise/fatigue (daytime sleepiness).   Cardiovascular: Negative for chest pain, claudication, dyspnea on exertion, leg swelling, near-syncope, orthopnea, palpitations, paroxysmal nocturnal dyspnea and syncope.   Respiratory: Negative for shortness of breath.    Hematologic/Lymphatic: Does not bruise/bleed easily.       Current Outpatient Medications:   •  acetaminophen (TYLENOL) 325 MG tablet, Take 650 mg by mouth Every 4 (Four) Hours As Needed for Mild Pain ., Disp: , Rfl:   •  aspirin 81 MG chewable tablet, Chew 1 tablet Daily., Disp: 30 tablet, Rfl: 0  •  atorvastatin (LIPITOR) 80 MG tablet, Take 80 mg by mouth Daily. Take 1/2 tablet by mouth every morning, Disp: , Rfl:   •  carboxymethylcellulose (REFRESH PLUS) 0.5 % solution, Administer 1 drop to both eyes As Needed., Disp: , Rfl:   •  Cholecalciferol 2000 units tablet, Take 1 tablet by mouth 2 (Two) Times a Day., Disp: , Rfl:   •  clopidogrel (PLAVIX) 75 MG tablet, Take 1 tablet by mouth Daily., Disp: 30 tablet, Rfl: 4  •  DICLOFENAC PO, Take 75 mg by mouth 2 (Two) Times a Day., Disp: , Rfl:   •  lisinopril (PRINIVIL,ZESTRIL) 2.5 MG tablet, Take 1 tablet by mouth Daily., Disp: 30 tablet, Rfl: 4  •  metoprolol tartrate (LOPRESSOR) 50 MG tablet, Take 1 tablet by mouth 2 (Two) Times a Day., Disp: 60 tablet, Rfl: 4  •  nitroglycerin (NITROSTAT) 0.4 MG SL tablet, Place 1 tablet under the tongue Every 5 (Five) Minutes As Needed for Chest Pain. Take no more than 3 doses in 15 minutes., Disp: 30 tablet, Rfl: 12  •  omeprazole (priLOSEC) 20 MG capsule, Take 20 mg by mouth 2 (Two) Times a Day., Disp: , Rfl:   •  pantoprazole (PROTONIX) 20 MG EC tablet, Take 20 mg by mouth 2 (Two) Times a Day., Disp: , Rfl:   •  tamsulosin  (FLOMAX) 0.4 MG capsule 24 hr capsule, Take 1 capsule by mouth Every Night., Disp: , Rfl:        Objective:      There were no vitals filed for this visit.   Self-reported by patient:  /82  Wt 212    Physical Exam   Constitutional: He is oriented to person, place, and time.   Neurological: He is alert and oriented to person, place, and time.   Psychiatric: He has a normal mood and affect. Thought content normal.       Lab Review:       Procedures    Results for orders placed during the hospital encounter of 06/28/19   Intra-Op [facility]    Narrative · Unremarkable intraoperative transesophageal echocardiogram.  · Left ventricular function appeared normal at the beginning of the case   and remained preserved on final images.  · No significant valvular abnormalities noted.          Cath report:          Assessment/Plan:     Problem List Items Addressed This Visit (all established, stable problems)       Cardiovascular and Mediastinum    HTN (hypertension)    Hyperlipidemia    Relevant Medications    atorvastatin (LIPITOR) 80 MG tablet    Coronary artery disease involving native coronary artery of native heart without angina pectoris - Primary    Overview     4v CABG 6/28/19 (initial presentation: UA; abnl stress-> cath with multivessel CAD). LIMA->LAD, SVG->acute marginal->PDA (sequential), and SVG->OM.  Also had 57 channel TMR.             Recommendations/plans:    Continue all meds:  -aspirin and Plavix since he presented with non-STEMI, with planned duration of treatment 12 months from presentation in June -- advised to stop plavix as of June '20  -Continue high intensity atorvastatin (40mg); Dr. Cobb is following this.  Goal LDL less than 70.  If this is not achieved on current dose atorvastatin, could either increase dose to 80mg qhs, add zetia, or initiate PCSK9 inhibitor therapy.  -Continue ACE inhibitor and beta-blocker at current doses    Re: his current complaint of fatigue (characterized as daytime  sleepiness), I encouraged him to resume wearing his CPAP mask before considering changing any other chronic medications that amber cardiovascular benefit to him    F/u 6 months    Satinder Ahumada MD  04/07/20  13:59

## 2020-09-21 NOTE — PROGRESS NOTES
Subjective    Mr. Esparza is 49 y.o. male    Chief Complaint: BPH    History of Present Illness    Benign Prostatic Hypertrophy  Patient complains of lower urinary tract symptoms. He reports frequency, incomplete emptying, intermittency, urgency and weak stream. He denies nocturia one time a night and straining. Patient states symptoms are of moderate severity. Onset of symptoms was several years ago and was gradual in onset. His AUA Symptom Score is, 19/35.He reports a history of no complicating symptoms. He denies flank pain, gross hematuria, kidney stones and recurrent UTI.  Patient has tried Alpha blockers without improvement. Last PSA was see below .        PSA- 0.4 (01/14/2020)  PSA- 0.504 (07/12/2019)  PSA- 0.338 (12/01/2017)    The following portions of the patient's history were reviewed and updated as appropriate: allergies, current medications, past family history, past medical history, past social history, past surgical history and problem list.    Review of Systems   Constitutional: Negative for appetite change and fever.   HENT: Negative for hearing loss and sore throat.    Eyes: Negative for pain and redness.   Respiratory: Negative for cough and shortness of breath.    Cardiovascular: Negative for chest pain and leg swelling.   Gastrointestinal: Negative for anal bleeding, nausea and vomiting.   Endocrine: Negative for cold intolerance and heat intolerance.   Genitourinary: Positive for difficulty urinating, frequency and urgency. Negative for dysuria, flank pain and hematuria.   Musculoskeletal: Negative for joint swelling and myalgias.   Skin: Negative for color change and rash.   Allergic/Immunologic: Negative for immunocompromised state.   Neurological: Negative for dizziness and speech difficulty.   Hematological: Negative for adenopathy. Does not bruise/bleed easily.   Psychiatric/Behavioral: Negative for dysphoric mood and suicidal ideas.         Current Outpatient Medications:   •  acetaminophen  (TYLENOL) 325 MG tablet, Take 650 mg by mouth Every 4 (Four) Hours As Needed for Mild Pain ., Disp: , Rfl:   •  aspirin 81 MG chewable tablet, Chew 1 tablet Daily., Disp: 30 tablet, Rfl: 0  •  atorvastatin (LIPITOR) 80 MG tablet, Take 80 mg by mouth Daily. Take 1/2 tablet by mouth every morning, Disp: , Rfl:   •  carboxymethylcellulose (REFRESH PLUS) 0.5 % solution, Administer 1 drop to both eyes As Needed., Disp: , Rfl:   •  Cholecalciferol 2000 units tablet, Take 1 tablet by mouth 2 (Two) Times a Day., Disp: , Rfl:   •  clopidogrel (PLAVIX) 75 MG tablet, Take 1 tablet by mouth Daily., Disp: 30 tablet, Rfl: 4  •  DICLOFENAC PO, Take 75 mg by mouth 2 (Two) Times a Day., Disp: , Rfl:   •  finasteride (PROSCAR) 5 MG tablet, Take 5 mg by mouth Daily., Disp: , Rfl:   •  lisinopril (PRINIVIL,ZESTRIL) 2.5 MG tablet, Take 1 tablet by mouth Daily., Disp: 30 tablet, Rfl: 4  •  metoprolol tartrate (LOPRESSOR) 50 MG tablet, Take 1 tablet by mouth 2 (Two) Times a Day., Disp: 60 tablet, Rfl: 4  •  nitroglycerin (NITROSTAT) 0.4 MG SL tablet, Place 1 tablet under the tongue Every 5 (Five) Minutes As Needed for Chest Pain. Take no more than 3 doses in 15 minutes., Disp: 30 tablet, Rfl: 12  •  omeprazole (priLOSEC) 20 MG capsule, Take 20 mg by mouth 2 (Two) Times a Day., Disp: , Rfl:   •  pantoprazole (PROTONIX) 20 MG EC tablet, Take 20 mg by mouth 2 (Two) Times a Day., Disp: , Rfl:   •  tamsulosin (FLOMAX) 0.4 MG capsule 24 hr capsule, Take 1 capsule by mouth Every Night., Disp: , Rfl:     Past Medical History:   Diagnosis Date   • Acid reflux    • Acid reflux    • Arthritis    • Bell's palsy    • CAD (coronary artery disease)    • CAD (coronary artery disease)    • Chest pain    • Enlarged prostate    • Hernia, ventral    • Hyperlipidemia    • Hypertension    • Kidney stone    • MI, old    • Sleep apnea        Past Surgical History:   Procedure Laterality Date   • ADENOIDECTOMY     • CARDIAC CATHETERIZATION N/A 6/14/2019     "Procedure: Left Heart Cath;  Surgeon: Satinder Ahumada MD;  Location:  [facility]ATH [facility];  Service: Cardiology   • CORONARY ARTERY BYPASS GRAFT N/A 2019    Procedure: CABG X 4 WITH LIMA, EVH WITH OPEN EXTENSION OF RLE, 57 CHANNEL TMR;  Surgeon: Jax Monzon MD;  Location:  [facility] OR;  Service: Cardiothoracic   • FOREIGN BODY REMOVAL     • FRACTURE SURGERY      LEFT FACIAL BONE FRACTURE REPAIR    • HERNIA REPAIR     • REPLACEMENT TOTAL KNEE Left    • TONSILLECTOMY     • TONSILLECTOMY     • TOTAL KNEE ARTHROPLASTY         Social History     Socioeconomic History   • Marital status: Single     Spouse name: Not on file   • Number of children: Not on file   • Years of education: Not on file   • Highest education level: Not on file   Tobacco Use   • Smoking status: Former Smoker     Packs/day: 0.25     Types: Cigarettes     Quit date: 2019     Years since quittin.2   • Smokeless tobacco: Never Used   Substance and Sexual Activity   • Alcohol use: Yes     Comment: occ wine    • Drug use: No   • Sexual activity: Defer       Family History   Problem Relation Age of Onset   • Diabetes Mother    • Heart disease Mother        Objective    Temp 97.7 °F (36.5 °C) (Temporal)   Ht 165.1 cm (65\")   Wt 99.3 kg (219 lb)   BMI 36.44 kg/m²     Physical Exam  Vitals signs reviewed.   Constitutional:       General: He is not in acute distress.     Appearance: He is well-developed. He is not diaphoretic.   HENT:      Nose: Nose normal.   Neck:      Musculoskeletal: Normal range of motion and neck supple.      Thyroid: No thyromegaly.      Trachea: No tracheal deviation.   Cardiovascular:      Rate and Rhythm: Normal rate and regular rhythm.      Comments: No significant edema or tenderness   Pulmonary:      Effort: No accessory muscle usage or respiratory distress.      Breath sounds: Normal breath sounds.   Abdominal:      General: Bowel sounds are normal. There is no distension.      Palpations: Abdomen " is soft. There is no mass.      Tenderness: There is no abdominal tenderness. There is no guarding or rebound.      Hernia: No hernia is present.      Comments: Stool specimen is not indicated for my portion of the exam   Genitourinary:     Penis: Normal. No tenderness (no lesion or deformities).       Scrotum/Testes: Normal.         Right: Mass, tenderness or swelling not present.         Left: Mass, tenderness or swelling not present.      Prostate: Tender: no nodules.      Rectum: Guaiac result negative. No mass or tenderness. Normal anal tone.      Comments:  The urethral meatus normal in position without evidence of stricture. Epididymis without mass or tenderness. Vas Deferens is palpably normal.Anus and perineum without mass or tenderness. The seminal vesicle are without mass or enlargement. The prostate is approximately 30 ml. It is Symmetric, with a Soft consistency. There are no nodules present. . The seminal vesicles are Not palpable due to the size of the prostate.    Lymphadenopathy:      Cervical: No cervical adenopathy.      Lower Body: No right inguinal adenopathy. No left inguinal adenopathy.   Skin:     General: Skin is warm and dry.      Coloration: Skin is not pale.      Findings: No rash.   Neurological:      Mental Status: He is alert and oriented to person, place, and time.   Psychiatric:         Behavior: Behavior normal.             Results for orders placed or performed during the hospital encounter of 06/28/19   Blood Gas, Arterial With Co-Ox    Specimen: Arterial Blood   Result Value Ref Range    Site Arterial Line     Rogerio's Test N/A     pH, Arterial 7.418 7.350 - 7.450 pH units    pCO2, Arterial 42.3 35.0 - 45.0 mm Hg    pO2, Arterial 458.0 (H) 83.0 - 108.0 mm Hg    HCO3, Arterial 27.3 (H) 20.0 - 26.0 mmol/L    Base Excess, Arterial 2.4 (H) 0.0 - 2.0 mmol/L    O2 Saturation, Arterial 100.1 (H) 94.0 - 99.0 %    Hemoglobin, Blood Gas 15.0 14 - 18 g/dL    Hematocrit, Blood Gas 45.9 38.0 -  51.0 %    Oxyhemoglobin 97.7 94 - 99 %    Methemoglobin 0.90 0.00 - 3.00 %    Carboxyhemoglobin 1.5 0 - 5 %    A-a Gradiant  mmHg    Temperature 37.0 C    Sodium, Arterial 139 136 - 145 mmol/L    Potassium, Arterial 4.1 3.5 - 5.2 mmol/L    Ionized Calcium 4.57 (L) 4.60 - 5.40 mg/dL    Barometric Pressure for Blood Gas 755 mmHg    Modality Ventilator     Ventilator Mode NA     Note      Collected by [id number]     pH, Temp Corrected  7.350 - 7.450 pH Units    pCO2, Temperature Corrected  35 - 45 mm Hg    pO2, Temperature Corrected  83 - 108 mm Hg   Blood Gas, Arterial With Co-Ox    Specimen: Arterial Blood   Result Value Ref Range    Site Arterial Line     Rogerio's Test N/A     pH, Arterial 7.351 7.350 - 7.450 pH units    pCO2, Arterial 50.0 (H) 35.0 - 45.0 mm Hg    pO2, Arterial 223.0 (H) 83.0 - 108.0 mm Hg    HCO3, Arterial 27.6 (H) 20.0 - 26.0 mmol/L    Base Excess, Arterial 1.1 0.0 - 2.0 mmol/L    O2 Saturation, Arterial 99.7 (H) 94.0 - 99.0 %    Hemoglobin, Blood Gas 14.9 14 - 18 g/dL    Hematocrit, Blood Gas 45.6 38.0 - 51.0 %    Oxyhemoglobin 97.6 94 - 99 %    Methemoglobin 0.80 0.00 - 3.00 %    Carboxyhemoglobin 1.2 0 - 5 %    A-a Gradiant  mmHg    Temperature 37.0 C    Sodium, Arterial 138 136 - 145 mmol/L    Potassium, Arterial 4.1 3.5 - 5.2 mmol/L    Ionized Calcium 4.60 4.60 - 5.40 mg/dL    Barometric Pressure for Blood Gas 754 mmHg    Modality Ventilator     Ventilator Mode NA     Note      Collected by [id number]     pH, Temp Corrected  7.350 - 7.450 pH Units    pCO2, Temperature Corrected  35 - 45 mm Hg    pO2, Temperature Corrected  83 - 108 mm Hg   Blood Gas, Arterial With Co-Ox    Specimen: Arterial Blood   Result Value Ref Range    Site Arterial Line     Rogerio's Test N/A     pH, Arterial 7.442 7.350 - 7.450 pH units    pCO2, Arterial 42.9 35.0 - 45.0 mm Hg    pO2, Arterial >547.0 (C) 83.0 - 108.0 mm Hg    HCO3, Arterial 29.2 (H) 20.0 - 26.0 mmol/L    Base Excess, Arterial 4.5 (H) 0.0 - 2.0 mmol/L    O2  Saturation, Arterial 100.1 (H) 94.0 - 99.0 %    Hemoglobin, Blood Gas 12.1 (L) 14 - 18 g/dL    Hematocrit, Blood Gas 37.1 (L) 38.0 - 51.0 %    Oxyhemoglobin 98.0 94 - 99 %    Methemoglobin 1.00 0.00 - 3.00 %    Carboxyhemoglobin 1.1 0 - 5 %    A-a Gradiant  mmHg    Temperature 37.0 C    Sodium, Arterial 138 136 - 145 mmol/L    Potassium, Arterial 3.8 3.5 - 5.2 mmol/L    Ionized Calcium 4.17 (L) 4.60 - 5.40 mg/dL    Barometric Pressure for Blood Gas 754 mmHg    Modality Ventilator     Ventilator Mode NA     Note      Collected by [id number]     pH, Temp Corrected  7.350 - 7.450 pH Units    pCO2, Temperature Corrected  35 - 45 mm Hg    pO2, Temperature Corrected  83 - 108 mm Hg   Blood Gas, Arterial With Co-Ox    Specimen: Arterial Blood   Result Value Ref Range    Site Arterial Line     Rogerio's Test N/A     pH, Arterial 7.415 7.350 - 7.450 pH units    pCO2, Arterial 43.2 35.0 - 45.0 mm Hg    pO2, Arterial >547.0 (C) 83.0 - 108.0 mm Hg    HCO3, Arterial 27.6 (H) 20.0 - 26.0 mmol/L    Base Excess, Arterial 2.7 (H) 0.0 - 2.0 mmol/L    O2 Saturation, Arterial 100.1 (H) 94.0 - 99.0 %    Hemoglobin, Blood Gas 11.9 (L) 14 - 18 g/dL    Hematocrit, Blood Gas 36.6 (L) 38.0 - 51.0 %    Oxyhemoglobin 98.0 94 - 99 %    Methemoglobin 1.10 0.00 - 3.00 %    Carboxyhemoglobin 1.1 0 - 5 %    A-a Gradiant  mmHg    Temperature 37.0 C    Sodium, Arterial 137 136 - 145 mmol/L    Potassium, Arterial 5.0 3.5 - 5.2 mmol/L    Ionized Calcium 4.25 (L) 4.60 - 5.40 mg/dL    Barometric Pressure for Blood Gas 754 mmHg    Modality Ventilator     Ventilator Mode NA     Note      Collected by [id number]     pH, Temp Corrected  7.350 - 7.450 pH Units    pCO2, Temperature Corrected  35 - 45 mm Hg    pO2, Temperature Corrected  83 - 108 mm Hg   Blood Gas, Arterial With Co-Ox    Specimen: Arterial Blood   Result Value Ref Range    Site Arterial Line     Rogerio's Test N/A     pH, Arterial 7.396 7.350 - 7.450 pH units    pCO2, Arterial 44.3 35.0 - 45.0 mm Hg     pO2, Arterial >547.0 (C) 83.0 - 108.0 mm Hg    HCO3, Arterial 27.2 (H) 20.0 - 26.0 mmol/L    Base Excess, Arterial 1.9 0.0 - 2.0 mmol/L    O2 Saturation, Arterial >100.1 (H) 94.0 - 99.0 %    Hemoglobin, Blood Gas 11.7 (L) 14 - 18 g/dL    Hematocrit, Blood Gas 35.8 (L) 38.0 - 51.0 %    Oxyhemoglobin 98.3 94 - 99 %    Methemoglobin 0.80 0.00 - 3.00 %    Carboxyhemoglobin 1.2 0 - 5 %    A-a Gradiant  mmHg    Temperature 37.0 C    Sodium, Arterial 137 136 - 145 mmol/L    Potassium, Arterial 5.1 3.5 - 5.2 mmol/L    Ionized Calcium 4.30 (L) 4.60 - 5.40 mg/dL    Barometric Pressure for Blood Gas 754 mmHg    Modality Ventilator     Ventilator Mode NA     Note      Collected by [id number]     pH, Temp Corrected  7.350 - 7.450 pH Units    pCO2, Temperature Corrected  35 - 45 mm Hg    pO2, Temperature Corrected  83 - 108 mm Hg   Blood Gas, Arterial With Co-Ox    Specimen: Arterial Blood   Result Value Ref Range    Site Arterial Line     Rogerio's Test N/A     pH, Arterial 7.429 7.350 - 7.450 pH units    pCO2, Arterial 39.3 35.0 - 45.0 mm Hg    pO2, Arterial >547.0 (C) 83.0 - 108.0 mm Hg    HCO3, Arterial 26.0 20.0 - 26.0 mmol/L    Base Excess, Arterial 1.6 0.0 - 2.0 mmol/L    O2 Saturation, Arterial >100.1 (H) 94.0 - 99.0 %    Hemoglobin, Blood Gas 11.7 (L) 14 - 18 g/dL    Hematocrit, Blood Gas 35.9 (L) 38.0 - 51.0 %    Oxyhemoglobin 98.5 94 - 99 %    Methemoglobin 0.70 0.00 - 3.00 %    Carboxyhemoglobin 1.1 0 - 5 %    A-a Gradiant  mmHg    Temperature 37.0 C    Sodium, Arterial 136 136 - 145 mmol/L    Potassium, Arterial 5.3 (H) 3.5 - 5.2 mmol/L    Ionized Calcium 4.25 (L) 4.60 - 5.40 mg/dL    Barometric Pressure for Blood Gas 753 mmHg    Modality Ventilator     Ventilator Mode NA     Note      Collected by [id number]     pH, Temp Corrected  7.350 - 7.450 pH Units    pCO2, Temperature Corrected  35 - 45 mm Hg    pO2, Temperature Corrected  83 - 108 mm Hg   Blood Gas, Arterial With Co-Ox    Specimen: Arterial Blood   Result Value Ref  Range    Site Arterial Line     Rogerio's Test N/A     pH, Arterial 7.415 7.350 - 7.450 pH units    pCO2, Arterial 40.9 35.0 - 45.0 mm Hg    pO2, Arterial 545.0 (C) 83.0 - 108.0 mm Hg    HCO3, Arterial 26.2 (H) 20.0 - 26.0 mmol/L    Base Excess, Arterial 1.5 0.0 - 2.0 mmol/L    O2 Saturation, Arterial >100.1 (H) 94.0 - 99.0 %    Hemoglobin, Blood Gas 11.6 (L) 14 - 18 g/dL    Hematocrit, Blood Gas 35.6 (L) 38.0 - 51.0 %    Oxyhemoglobin 98.3 94 - 99 %    Methemoglobin 0.80 0.00 - 3.00 %    Carboxyhemoglobin 1.0 0 - 5 %    A-a Gradiant  mmHg    Temperature 37.0 C    Sodium, Arterial 136 136 - 145 mmol/L    Potassium, Arterial 5.4 (H) 3.5 - 5.2 mmol/L    Ionized Calcium 4.31 (L) 4.60 - 5.40 mg/dL    Barometric Pressure for Blood Gas 753 mmHg    Modality Ventilator     Ventilator Mode NA     Note      Collected by [id number]     pH, Temp Corrected  7.350 - 7.450 pH Units    pCO2, Temperature Corrected  35 - 45 mm Hg    pO2, Temperature Corrected  83 - 108 mm Hg   Blood Gas, Arterial With Co-Ox    Specimen: Arterial Blood   Result Value Ref Range    Site Arterial Line     Rogerio's Test N/A     pH, Arterial 7.372 7.350 - 7.450 pH units    pCO2, Arterial 45.2 (H) 35.0 - 45.0 mm Hg    pO2, Arterial 121.0 (H) 83.0 - 108.0 mm Hg    HCO3, Arterial 26.2 (H) 20.0 - 26.0 mmol/L    Base Excess, Arterial 0.6 0.0 - 2.0 mmol/L    O2 Saturation, Arterial 99.0 94.0 - 99.0 %    Hemoglobin, Blood Gas 11.6 (L) 14 - 18 g/dL    Hematocrit, Blood Gas 35.5 (L) 38.0 - 51.0 %    Oxyhemoglobin 96.9 94 - 99 %    Methemoglobin 1.00 0.00 - 3.00 %    Carboxyhemoglobin 1.2 0 - 5 %    A-a Gradiant  mmHg    Temperature 37.0 C    Sodium, Arterial 139 136 - 145 mmol/L    Potassium, Arterial 4.4 3.5 - 5.2 mmol/L    Ionized Calcium 5.01 4.60 - 5.40 mg/dL    Barometric Pressure for Blood Gas 753 mmHg    Modality Ventilator     Ventilator Mode NA     Note      Collected by [id number]     pH, Temp Corrected  7.350 - 7.450 pH Units    pCO2, Temperature Corrected  35 - 45  mm Hg    pO2, Temperature Corrected  83 - 108 mm Hg   Protime-INR    Specimen: Blood   Result Value Ref Range    Protime 15.5 (H) 11.9 - 14.6 Seconds    INR 1.19 (H) 0.91 - 1.09   aPTT    Specimen: Blood   Result Value Ref Range    PTT 31.1 24.1 - 35.0 seconds   CBC (No Diff)    Specimen: Blood   Result Value Ref Range    WBC 18.07 (H) 4.80 - 10.80 10*3/mm3    RBC 3.81 (L) 4.80 - 5.90 10*6/mm3    Hemoglobin 11.9 (L) 14.0 - 18.0 g/dL    Hematocrit 32.9 (L) 40.0 - 52.0 %    MCV 86.4 82.0 - 95.0 fL    MCH 31.2 28.0 - 32.0 pg    MCHC 36.2 (H) 33.0 - 36.0 g/dL    RDW 12.4 12.0 - 15.0 %    RDW-SD 39.0 (L) 40.0 - 54.0 fl    MPV 10.8 6.0 - 12.0 fL    Platelets 224 130 - 400 10*3/mm3   Renal Function Panel    Specimen: Blood   Result Value Ref Range    Glucose 125 (H) 70 - 100 mg/dL    BUN 12 5 - 21 mg/dL    Creatinine 0.83 0.50 - 1.40 mg/dL    Sodium 140 135 - 145 mmol/L    Potassium 4.2 3.5 - 5.3 mmol/L    Chloride 108 98 - 110 mmol/L    CO2 26.0 24.0 - 31.0 mmol/L    Calcium 8.2 (L) 8.4 - 10.4 mg/dL    Albumin 3.30 (L) 3.50 - 5.00 g/dL    Phosphorus 3.6 2.5 - 4.5 mg/dL    Anion Gap 6.0 4.0 - 13.0 mmol/L    BUN/Creatinine Ratio 14.5 7.0 - 25.0    eGFR Non African Amer 98 >60 mL/min/1.73   Renal Function Panel    Specimen: Blood   Result Value Ref Range    Glucose 122 (H) 70 - 100 mg/dL    BUN 12 5 - 21 mg/dL    Creatinine 0.81 0.50 - 1.40 mg/dL    Sodium 139 135 - 145 mmol/L    Potassium 4.2 3.5 - 5.3 mmol/L    Chloride 109 98 - 110 mmol/L    CO2 24.0 24.0 - 31.0 mmol/L    Calcium 7.4 (L) 8.4 - 10.4 mg/dL    Albumin 3.00 (L) 3.50 - 5.00 g/dL    Phosphorus 3.6 2.5 - 4.5 mg/dL    Anion Gap 6.0 4.0 - 13.0 mmol/L    BUN/Creatinine Ratio 14.8 7.0 - 25.0    eGFR Non African Amer 101 >60 mL/min/1.73   Blood Gas, Arterial    Specimen: Arterial Blood   Result Value Ref Range    Site Arterial Line     Rogerio's Test N/A     pH, Arterial 7.421 7.350 - 7.450 pH units    pCO2, Arterial 39.0 35.0 - 45.0 mm Hg    pO2, Arterial 102.0 83.0 -  108.0 mm Hg    HCO3, Arterial 25.3 20.0 - 26.0 mmol/L    Base Excess, Arterial 0.8 0.0 - 2.0 mmol/L    O2 Saturation, Arterial 98.4 94.0 - 99.0 %    Temperature 37.0 C    Barometric Pressure for Blood Gas 753 mmHg    Modality Ventilator     FIO2 60 %    Ventilator Mode SIMV     Set Tidal Volume 700     Set Mech Resp Rate 14.0     PEEP 5.0     PSV 10.0 cmH2O    Collected by [id number]    Calcium, Ionized    Specimen: Blood   Result Value Ref Range    Ionized Calcium 4.58 (L) 4.60 - 5.40 mg/dL    Collected by [id number]    Protime-INR    Specimen: Blood   Result Value Ref Range    Protime 13.7 11.9 - 14.6 Seconds    INR 1.02 0.91 - 1.09   CBC Auto Differential    Specimen: Blood   Result Value Ref Range    WBC 19.79 (H) 4.80 - 10.80 10*3/mm3    RBC 3.94 (L) 4.80 - 5.90 10*6/mm3    Hemoglobin 12.1 (L) 14.0 - 18.0 g/dL    Hematocrit 34.3 (L) 40.0 - 52.0 %    MCV 87.1 82.0 - 95.0 fL    MCH 30.7 28.0 - 32.0 pg    MCHC 35.3 33.0 - 36.0 g/dL    RDW 12.4 12.0 - 15.0 %    RDW-SD 39.9 (L) 40.0 - 54.0 fl    MPV 10.1 6.0 - 12.0 fL    Platelets 253 130 - 400 10*3/mm3    Neutrophil % 76.1 39.0 - 78.0 %    Lymphocyte % 13.4 (L) 15.0 - 45.0 %    Monocyte % 8.4 4.0 - 12.0 %    Eosinophil % 0.8 0.0 - 4.0 %    Basophil % 0.6 0.0 - 2.0 %    Immature Grans % 0.7 0.0 - 5.0 %    Neutrophils, Absolute 15.07 (H) 1.87 - 8.40 10*3/mm3    Lymphocytes, Absolute 2.65 0.72 - 4.86 10*3/mm3    Monocytes, Absolute 1.67 (H) 0.19 - 1.30 10*3/mm3    Eosinophils, Absolute 0.16 0.00 - 0.70 10*3/mm3    Basophils, Absolute 0.11 0.00 - 0.20 10*3/mm3    Immature Grans, Absolute 0.13 (H) 0.00 - 0.05 10*3/mm3    nRBC 0.0 0.0 - 0.2 /100 WBC   Blood Gas, Arterial    Specimen: Arterial Blood   Result Value Ref Range    Site Arterial Line     Rogerio's Test N/A     pH, Arterial 7.375 7.350 - 7.450 pH units    pCO2, Arterial 44.9 35.0 - 45.0 mm Hg    pO2, Arterial 107.0 83.0 - 108.0 mm Hg    HCO3, Arterial 26.3 (H) 20.0 - 26.0 mmol/L    Base Excess, Arterial 0.6 0.0 - 2.0  mmol/L    O2 Saturation, Arterial 98.2 94.0 - 99.0 %    Temperature 37.0 C    Barometric Pressure for Blood Gas 753 mmHg    Modality Ventilator     FIO2 50 %    Ventilator Mode SIMV     Set Tidal Volume 700     Set Mech Resp Rate 14.0     PEEP 5.0     PSV 10.0 cmH2O    Collected by [id number]    Blood Gas, Arterial    Specimen: Arterial Blood   Result Value Ref Range    Site Arterial Line     Rogerio's Test N/A     pH, Arterial 7.350 7.350 - 7.450 pH units    pCO2, Arterial 47.0 (H) 35.0 - 45.0 mm Hg    pO2, Arterial 110.0 (H) 83.0 - 108.0 mm Hg    HCO3, Arterial 25.9 20.0 - 26.0 mmol/L    Base Excess, Arterial -0.2 (L) 0.0 - 2.0 mmol/L    O2 Saturation, Arterial 98.0 94.0 - 99.0 %    Temperature 37.0 C    Barometric Pressure for Blood Gas 753 mmHg    Modality Ventilator     FIO2 50 %    Ventilator Mode SIMV     Set Tidal Volume 700     Set Mech Resp Rate 12.0     PEEP 5.0     PSV 10.0 cmH2O    Collected by [id number]    CBC (No Diff)    Specimen: Blood   Result Value Ref Range    WBC 18.48 (H) 4.80 - 10.80 10*3/mm3    RBC 4.08 (L) 4.80 - 5.90 10*6/mm3    Hemoglobin 12.7 (L) 14.0 - 18.0 g/dL    Hematocrit 35.6 (L) 40.0 - 52.0 %    MCV 87.3 82.0 - 95.0 fL    MCH 31.1 28.0 - 32.0 pg    MCHC 35.7 33.0 - 36.0 g/dL    RDW 12.8 12.0 - 15.0 %    RDW-SD 40.7 40.0 - 54.0 fl    MPV 10.2 6.0 - 12.0 fL    Platelets 250 130 - 400 10*3/mm3   Basic Metabolic Panel    Specimen: Blood   Result Value Ref Range    Glucose 141 (H) 70 - 100 mg/dL    BUN 11 5 - 21 mg/dL    Creatinine 0.73 0.50 - 1.40 mg/dL    Sodium 140 135 - 145 mmol/L    Potassium 4.5 3.5 - 5.3 mmol/L    Chloride 109 98 - 110 mmol/L    CO2 24.0 24.0 - 31.0 mmol/L    Calcium 7.8 (L) 8.4 - 10.4 mg/dL    eGFR Non African Amer 114 >60 mL/min/1.73    BUN/Creatinine Ratio 15.1 7.0 - 25.0    Anion Gap 7.0 4.0 - 13.0 mmol/L   Blood Gas, Arterial    Specimen: Arterial Blood   Result Value Ref Range    Site Arterial Line     Rogerio's Test N/A     pH, Arterial 7.384 7.350 - 7.450 pH units     pCO2, Arterial 44.2 35.0 - 45.0 mm Hg    pO2, Arterial 97.3 83.0 - 108.0 mm Hg    HCO3, Arterial 26.4 (H) 20.0 - 26.0 mmol/L    Base Excess, Arterial 0.9 0.0 - 2.0 mmol/L    O2 Saturation, Arterial 97.7 94.0 - 99.0 %    Temperature 37.0 C    Barometric Pressure for Blood Gas 753 mmHg    Modality Ventilator     FIO2 40 %    Ventilator Mode SIMV     Set Tidal Volume 700     Set Mech Resp Rate 12.0     PEEP 5.0     PSV 10.0 cmH2O    Collected by [id number]    CBC (No Diff)    Specimen: Blood   Result Value Ref Range    WBC 14.12 (H) 4.80 - 10.80 10*3/mm3    RBC 3.45 (L) 4.80 - 5.90 10*6/mm3    Hemoglobin 10.6 (L) 14.0 - 18.0 g/dL    Hematocrit 31.3 (L) 40.0 - 52.0 %    MCV 90.7 82.0 - 95.0 fL    MCH 30.7 28.0 - 32.0 pg    MCHC 33.9 33.0 - 36.0 g/dL    RDW 12.7 12.0 - 15.0 %    RDW-SD 41.9 40.0 - 54.0 fl    MPV 10.5 6.0 - 12.0 fL    Platelets 181 130 - 400 10*3/mm3   Basic Metabolic Panel    Specimen: Blood   Result Value Ref Range    Glucose 133 (H) 70 - 100 mg/dL    BUN 9 5 - 21 mg/dL    Creatinine 0.79 0.50 - 1.40 mg/dL    Sodium 137 135 - 145 mmol/L    Potassium 4.2 3.5 - 5.3 mmol/L    Chloride 101 98 - 110 mmol/L    CO2 29.0 24.0 - 31.0 mmol/L    Calcium 8.0 (L) 8.4 - 10.4 mg/dL    eGFR Non African Amer 104 >60 mL/min/1.73    BUN/Creatinine Ratio 11.4 7.0 - 25.0    Anion Gap 7.0 4.0 - 13.0 mmol/L   CBC (No Diff)    Specimen: Blood   Result Value Ref Range    WBC 10.65 4.80 - 10.80 10*3/mm3    RBC 3.21 (L) 4.80 - 5.90 10*6/mm3    Hemoglobin 9.9 (L) 14.0 - 18.0 g/dL    Hematocrit 28.7 (L) 40.0 - 52.0 %    MCV 89.4 82.0 - 95.0 fL    MCH 30.8 28.0 - 32.0 pg    MCHC 34.5 33.0 - 36.0 g/dL    RDW 12.6 12.0 - 15.0 %    RDW-SD 40.9 40.0 - 54.0 fl    MPV 10.8 6.0 - 12.0 fL    Platelets 221 130 - 400 10*3/mm3   Basic Metabolic Panel    Specimen: Blood   Result Value Ref Range    Glucose 105 (H) 70 - 100 mg/dL    BUN 10 5 - 21 mg/dL    Creatinine 0.81 0.50 - 1.40 mg/dL    Sodium 138 135 - 145 mmol/L    Potassium 4.5 3.5 - 5.3  mmol/L    Chloride 101 98 - 110 mmol/L    CO2 29.0 24.0 - 31.0 mmol/L    Calcium 8.1 (L) 8.4 - 10.4 mg/dL    eGFR Non African Amer 101 >60 mL/min/1.73    BUN/Creatinine Ratio 12.3 7.0 - 25.0    Anion Gap 8.0 4.0 - 13.0 mmol/L   POC Glucose Once    Specimen: Blood   Result Value Ref Range    Glucose 135 (H) 70 - 130 mg/dL   POC Glucose Once    Specimen: Blood   Result Value Ref Range    Glucose 125 70 - 130 mg/dL   POC Glucose Once    Specimen: Blood   Result Value Ref Range    Glucose 139 (H) 70 - 130 mg/dL   POC Glucose Once    Specimen: Blood   Result Value Ref Range    Glucose 135 (H) 70 - 130 mg/dL   POC Glucose Once    Specimen: Blood   Result Value Ref Range    Glucose 145 (H) 70 - 130 mg/dL   POC Glucose Once    Specimen: Blood   Result Value Ref Range    Glucose 142 (H) 70 - 130 mg/dL   POC Glucose Once    Specimen: Blood   Result Value Ref Range    Glucose 147 (H) 70 - 130 mg/dL   POC Glucose Once    Specimen: Blood   Result Value Ref Range    Glucose 144 (H) 70 - 130 mg/dL   POC Glucose Once    Specimen: Blood   Result Value Ref Range    Glucose 142 (H) 70 - 130 mg/dL   POC Glucose Once    Specimen: Blood   Result Value Ref Range    Glucose 142 (H) 70 - 130 mg/dL   POC Glucose Once    Specimen: Blood   Result Value Ref Range    Glucose 140 (H) 70 - 130 mg/dL   Prepare RBC, 2 Units   Result Value Ref Range    Product Code L7952W78     Unit Number [id number]     UNIT  ABO O     UNIT  RH POS     Crossmatch Interpretation Compatible     Dispense Status RE     Blood Type OPOS     Blood Expiration Date [id number]     Blood Type Barcode 5100     Product Code R7284M88     Unit Number [id number]     UNIT  ABO O     UNIT  RH POS     Crossmatch Interpretation Compatible     Dispense Status RE     Blood Type OPOS     Blood Expiration Date [id number]     Blood Type Barcode 5100        Bladder Scan interpretation  Estimation of residual urine via abdominal ultrasound  Residual Urine: 112 ml  Indication:  BPH  Position: Supine  Examination: Incremental scanning of the suprapubic area using 3 MHz transducer using copious amounts of acoustic gel.   Findings: An anechoic area was demonstrated which represented the bladder, with measurement of residual urine as noted. I inspected this myself. In that the residual urine was stable or insignificant, no treatment will be necessary at this time.         Assessment and Plan    Diagnoses and all orders for this visit:    BPH with obstruction/lower urinary tract symptoms  -     Cancel: POC Urinalysis Dipstick, Multipro        AUA score 19/35.  He is on Flomax.  Same significant retrograde ejaculation with is bothersome to him.  We will hold off on finasteride given his young age.  He is going to follow-up to see me for a cystoscopy to evaluate.  We did discuss UroLift today with him.

## 2020-09-23 ENCOUNTER — OFFICE VISIT (OUTPATIENT)
Dept: UROLOGY | Facility: CLINIC | Age: 50
End: 2020-09-23

## 2020-09-23 VITALS — TEMPERATURE: 97.7 F | WEIGHT: 219 LBS | HEIGHT: 65 IN | BODY MASS INDEX: 36.49 KG/M2

## 2020-09-23 DIAGNOSIS — N13.8 BPH WITH OBSTRUCTION/LOWER URINARY TRACT SYMPTOMS: Primary | ICD-10-CM

## 2020-09-23 DIAGNOSIS — N40.1 BPH WITH OBSTRUCTION/LOWER URINARY TRACT SYMPTOMS: Primary | ICD-10-CM

## 2020-09-23 PROCEDURE — 99204 OFFICE O/P NEW MOD 45 MIN: CPT | Performed by: UROLOGY

## 2020-09-23 RX ORDER — FINASTERIDE 5 MG/1
5 TABLET, FILM COATED ORAL DAILY
COMMUNITY
End: 2020-11-12

## 2020-10-26 ENCOUNTER — PROCEDURE VISIT (OUTPATIENT)
Dept: UROLOGY | Facility: CLINIC | Age: 50
End: 2020-10-26

## 2020-10-26 DIAGNOSIS — N40.1 BPH WITH OBSTRUCTION/LOWER URINARY TRACT SYMPTOMS: Primary | ICD-10-CM

## 2020-10-26 DIAGNOSIS — N13.8 BPH WITH OBSTRUCTION/LOWER URINARY TRACT SYMPTOMS: Primary | ICD-10-CM

## 2020-10-26 LAB
BILIRUB BLD-MCNC: NEGATIVE MG/DL
CLARITY, POC: CLEAR
COLOR UR: YELLOW
GLUCOSE UR STRIP-MCNC: NEGATIVE MG/DL
KETONES UR QL: NEGATIVE
LEUKOCYTE EST, POC: NEGATIVE
NITRITE UR-MCNC: NEGATIVE MG/ML
PH UR: 7 [PH] (ref 5–8)
PROT UR STRIP-MCNC: NEGATIVE MG/DL
RBC # UR STRIP: NEGATIVE /UL
SP GR UR: 1.01 (ref 1–1.03)
UROBILINOGEN UR QL: NORMAL

## 2020-10-26 PROCEDURE — 99213 OFFICE O/P EST LOW 20 MIN: CPT | Performed by: UROLOGY

## 2020-10-26 PROCEDURE — 52000 CYSTOURETHROSCOPY: CPT | Performed by: UROLOGY

## 2020-10-26 PROCEDURE — 81003 URINALYSIS AUTO W/O SCOPE: CPT | Performed by: UROLOGY

## 2020-10-26 RX ORDER — SODIUM CHLORIDE 9 MG/ML
100 INJECTION, SOLUTION INTRAVENOUS CONTINUOUS
Status: CANCELLED | OUTPATIENT
Start: 2020-10-26

## 2020-10-26 NOTE — PROGRESS NOTES
Subjective    Mr. Esparza is 49 y.o. male    Chief Complaint: BPH    History of Present Illness     Benign Prostatic Hypertrophy  Patient complains of lower urinary tract symptoms. He reports frequency, incomplete emptying, intermittency, urgency and weak stream. He denies nocturia one time a night and straining. Patient states symptoms are of moderate severity. Onset of symptoms was several years ago and was gradual in onset. His AUA Symptom Score is, 19/35.He reports a history of no complicating symptoms. He denies flank pain, gross hematuria, kidney stones and recurrent UTI.  Patient has tried Alpha blockers without improvement. Last PSA was see below .       The following portions of the patient's history were reviewed and updated as appropriate: allergies, current medications, past family history, past medical history, past social history, past surgical history and problem list.    Review of Systems   Constitutional: Negative for chills and fever.   Gastrointestinal: Negative for abdominal pain, anal bleeding and blood in stool.   Genitourinary: Positive for difficulty urinating, frequency and urgency. Negative for dysuria and hematuria.         Current Outpatient Medications:   •  acetaminophen (TYLENOL) 325 MG tablet, Take 650 mg by mouth Every 4 (Four) Hours As Needed for Mild Pain ., Disp: , Rfl:   •  aspirin 81 MG chewable tablet, Chew 1 tablet Daily., Disp: 30 tablet, Rfl: 0  •  atorvastatin (LIPITOR) 80 MG tablet, Take 80 mg by mouth Daily. Take 1/2 tablet by mouth every morning, Disp: , Rfl:   •  carboxymethylcellulose (REFRESH PLUS) 0.5 % solution, Administer 1 drop to both eyes As Needed., Disp: , Rfl:   •  Cholecalciferol 2000 units tablet, Take 1 tablet by mouth 2 (Two) Times a Day., Disp: , Rfl:   •  clopidogrel (PLAVIX) 75 MG tablet, Take 1 tablet by mouth Daily., Disp: 30 tablet, Rfl: 4  •  DICLOFENAC PO, Take 75 mg by mouth 2 (Two) Times a Day., Disp: , Rfl:   •  finasteride (PROSCAR) 5 MG tablet,  Take 5 mg by mouth Daily., Disp: , Rfl:   •  lisinopril (PRINIVIL,ZESTRIL) 2.5 MG tablet, Take 1 tablet by mouth Daily., Disp: 30 tablet, Rfl: 4  •  metoprolol tartrate (LOPRESSOR) 50 MG tablet, Take 1 tablet by mouth 2 (Two) Times a Day., Disp: 60 tablet, Rfl: 4  •  nitroglycerin (NITROSTAT) 0.4 MG SL tablet, Place 1 tablet under the tongue Every 5 (Five) Minutes As Needed for Chest Pain. Take no more than 3 doses in 15 minutes., Disp: 30 tablet, Rfl: 12  •  omeprazole (priLOSEC) 20 MG capsule, Take 20 mg by mouth 2 (Two) Times a Day., Disp: , Rfl:   •  pantoprazole (PROTONIX) 20 MG EC tablet, Take 20 mg by mouth 2 (Two) Times a Day., Disp: , Rfl:   •  tamsulosin (FLOMAX) 0.4 MG capsule 24 hr capsule, Take 1 capsule by mouth Every Night., Disp: , Rfl:     Past Medical History:   Diagnosis Date   • Acid reflux    • Acid reflux    • Arthritis    • Bell's palsy    • CAD (coronary artery disease)    • CAD (coronary artery disease)    • Chest pain    • Enlarged prostate    • Hernia, ventral    • Hyperlipidemia    • Hypertension    • Kidney stone    • MI, old    • Sleep apnea        Past Surgical History:   Procedure Laterality Date   • ADENOIDECTOMY     • CARDIAC CATHETERIZATION N/A 6/14/2019    Procedure: Left Heart Cath;  Surgeon: Satinder Ahumada MD;  Location:  [facility]ATH [facility]AS[facility];  Service: Cardiology   • CORONARY ARTERY BYPASS GRAFT N/A 6/28/2019    Procedure: CABG X 4 WITH LIMA, EVH WITH OPEN EXTENSION OF RLE, 57 CHANNEL TMR;  Surgeon: Jax Monzon MD;  Location:  [facility] OR;  Service: Cardiothoracic   • FOREIGN BODY REMOVAL     • FRACTURE SURGERY      LEFT FACIAL BONE FRACTURE REPAIR    • HERNIA REPAIR     • REPLACEMENT TOTAL KNEE Left    • TONSILLECTOMY     • TONSILLECTOMY     • TOTAL KNEE ARTHROPLASTY         Social History     Socioeconomic History   • Marital status: Single     Spouse name: Not on file   • Number of children: Not on file   • Years of education: Not on file   • Highest education  level: Not on file   Tobacco Use   • Smoking status: Former Smoker     Packs/day: 0.25     Types: Cigarettes     Quit date: 2019     Years since quittin.3   • Smokeless tobacco: Never Used   Substance and Sexual Activity   • Alcohol use: Yes     Comment: occ wine    • Drug use: No   • Sexual activity: Defer       Family History   Problem Relation Age of Onset   • Diabetes Mother    • Heart disease Mother        Objective    There were no vitals taken for this visit.    Physical Exam  Vitals signs reviewed.   Constitutional:       General: He is not in acute distress.     Appearance: He is well-developed. He is not diaphoretic.   Pulmonary:      Effort: Pulmonary effort is normal.   Abdominal:      General: There is no distension.      Palpations: Abdomen is soft. There is no mass.      Tenderness: There is no abdominal tenderness. There is no guarding or rebound.      Hernia: No hernia is present.   Skin:     General: Skin is warm and dry.   Neurological:      Mental Status: He is alert and oriented to person, place, and time.     Pre- operative diagnosis:  Benign prostatic hypertrophy    Post operative diagnosis:  Same    Procedure:  The patient was prepped and draped in a normal sterile fashion.  The urethra was anesthetized with 2% lidocaine jelly.  A flexible cystoscope was introduced per urethra.      Urethra:  Normal    Bladder:  mild trabeculation    Ureteral orifices:  Normal position bilaterally and Clear efflux bilaterally    Prostate:  lateral lobe hypertrophy    Patient tolerated the procedure well    Complications: none    Blood loss: minimal    Follow up:    Schedule for OR  Urolift          Results for orders placed or performed in visit on 10/26/20   POC Urinalysis Dipstick, Multipro    Specimen: Urine   Result Value Ref Range    Color Yellow Yellow, Straw, Dark Yellow, Idania    Clarity, UA Clear Clear    Glucose, UA Negative Negative, 1000 mg/dL (3+) mg/dL    Bilirubin Negative Negative     Ketones, UA Negative Negative    Specific Gravity  1.010 1.005 - 1.030    Blood, UA Negative Negative    pH, Urine 7.0 5.0 - 8.0    Protein, POC Negative Negative mg/dL    Urobilinogen, UA Normal Normal    Nitrite, UA Negative Negative    Leukocytes Negative Negative     Assessment and Plan    Diagnoses and all orders for this visit:    1. BPH with obstruction/lower urinary tract symptoms (Primary)  -     POC Urinalysis Dipstick, Multipro  -     Case Request; Standing  -     sodium chloride 0.9 % infusion  -     ceFAZolin (ANCEF) 2 g in sodium chloride 0.9 % 100 mL IVPB  -     Case Request    Other orders  -     Follow Anesthesia Guidelines / Standing Orders; Future  -     Obtain informed consent  -     Provide NPO Instructions to Patient; Future  -     Follow Anesthesia Guidelines / Standing Orders; Standing  -     Verify NPO Status; Standing  -     SCD (sequential compression device)- to be placed on patient in Pre-op; Standing          Patient with significant voiding symptoms.  Cystoscopy showed lateral lobe hypertrophy of the prostate today.  Discussed options with him he would like to proceed with the UroLift.  Discussion of this resulted in significant evaluation management in addition to the cystoscopy done today.  He will likely need  4 implants.

## 2020-11-06 ENCOUNTER — HOSPITAL ENCOUNTER (OUTPATIENT)
Dept: GENERAL RADIOLOGY | Facility: HOSPITAL | Age: 50
Discharge: HOME | End: 2020-11-06
Admitting: NURSE PRACTITIONER

## 2020-11-06 DIAGNOSIS — M25.511 ACUTE PAIN OF RIGHT SHOULDER: ICD-10-CM

## 2020-11-06 PROCEDURE — 73030 X-RAY EXAM OF SHOULDER: CPT

## 2020-11-12 ENCOUNTER — APPOINTMENT (OUTPATIENT)
Dept: PREADMISSION TESTING | Facility: HOSPITAL | Age: 50
End: 2020-11-12

## 2020-11-12 ENCOUNTER — TRANSCRIBE ORDERS (OUTPATIENT)
Dept: ADMINISTRATIVE | Facility: HOSPITAL | Age: 50
End: 2020-11-12

## 2020-11-12 VITALS
HEART RATE: 70 BPM | SYSTOLIC BLOOD PRESSURE: 142 MMHG | OXYGEN SATURATION: 97 % | HEIGHT: 67 IN | RESPIRATION RATE: 16 BRPM | DIASTOLIC BLOOD PRESSURE: 83 MMHG | BODY MASS INDEX: 34.08 KG/M2 | WEIGHT: 217.15 LBS

## 2020-11-12 DIAGNOSIS — Z11.59 SCREENING FOR VIRAL DISEASE: Primary | ICD-10-CM

## 2020-11-12 LAB
ANION GAP SERPL CALCULATED.3IONS-SCNC: 9 MMOL/L (ref 5–15)
BUN SERPL-MCNC: 9 MG/DL (ref 6–20)
BUN/CREAT SERPL: 9.7 (ref 7–25)
CALCIUM SPEC-SCNC: 9.4 MG/DL (ref 8.6–10.5)
CHLORIDE SERPL-SCNC: 104 MMOL/L (ref 98–107)
CO2 SERPL-SCNC: 27 MMOL/L (ref 22–29)
CREAT SERPL-MCNC: 0.93 MG/DL (ref 0.76–1.27)
DEPRECATED RDW RBC AUTO: 39.2 FL (ref 37–54)
ERYTHROCYTE [DISTWIDTH] IN BLOOD BY AUTOMATED COUNT: 12.6 % (ref 12.3–15.4)
GFR SERPL CREATININE-BSD FRML MDRD: 86 ML/MIN/1.73
GLUCOSE SERPL-MCNC: 159 MG/DL (ref 65–99)
HCT VFR BLD AUTO: 41.6 % (ref 37.5–51)
HGB BLD-MCNC: 14.8 G/DL (ref 13–17.7)
MCH RBC QN AUTO: 30.5 PG (ref 26.6–33)
MCHC RBC AUTO-ENTMCNC: 35.6 G/DL (ref 31.5–35.7)
MCV RBC AUTO: 85.8 FL (ref 79–97)
PLATELET # BLD AUTO: 333 10*3/MM3 (ref 140–450)
PMV BLD AUTO: 10.3 FL (ref 6–12)
POTASSIUM SERPL-SCNC: 4.2 MMOL/L (ref 3.5–5.2)
RBC # BLD AUTO: 4.85 10*6/MM3 (ref 4.14–5.8)
SODIUM SERPL-SCNC: 140 MMOL/L (ref 136–145)
WBC # BLD AUTO: 7.39 10*3/MM3 (ref 3.4–10.8)

## 2020-11-12 PROCEDURE — 93005 ELECTROCARDIOGRAM TRACING: CPT

## 2020-11-12 PROCEDURE — 80048 BASIC METABOLIC PNL TOTAL CA: CPT

## 2020-11-12 PROCEDURE — 36415 COLL VENOUS BLD VENIPUNCTURE: CPT

## 2020-11-12 PROCEDURE — 85027 COMPLETE CBC AUTOMATED: CPT

## 2020-11-12 PROCEDURE — 93010 ELECTROCARDIOGRAM REPORT: CPT | Performed by: INTERNAL MEDICINE

## 2020-11-12 RX ORDER — ACETAMINOPHEN 500 MG
1000 TABLET ORAL 2 TIMES DAILY
COMMUNITY

## 2020-11-12 RX ORDER — CHOLECALCIFEROL (VITAMIN D3) 125 MCG
500 CAPSULE ORAL DAILY
COMMUNITY

## 2020-11-12 NOTE — DISCHARGE INSTRUCTIONS
DAY OF SURGERY INSTRUCTIONS        YOUR SURGEON: ***GLENROY NUÑEZ    PROCEDURE: ***PROSTATIC URETHRAL LIFT    DATE OF SURGERY: ***November 19,2020    ARRIVAL TIME: AS DIRECTED BY OFFICE    YOU MAY TAKE THE FOLLOWING MEDICATION(S) THE MORNING OF SURGERY WITH A SIP OF WATER: ***METOPROLOL      ALL OTHER HOME MEDICATION CHECK WITH YOUR PHYSICIAN      DO NOT TAKE ANY ERECTILE DYSFUNCTION MEDICATIONS (EX: CIALIS, VIAGRA) 24 HOURS PRIOR TO SURGERY                      MANAGING PAIN AFTER SURGERY    We know you are probably wondering what your pain will be like after surgery.  Following surgery it is unrealistic to expect you will not have pain.   Pain is how our bodies let us know that something is wrong or cautions us to be careful.  That said, our goal is to make your pain tolerable.    Methods we may use to treat your pain include (oral or IV medications, PCAs, epidurals, nerve blocks, etc.)   While some procedures require IV pain medications for a short time after surgery, transitioning to pain medications by mouth allows for better management of pain.   Your nurse will encourage you to take oral pain medications whenever possible.  IV medications work almost immediately, but only last a short while.  Taking medications by mouth allows for a more constant level of medication in your blood stream for a longer period of time.      Once your pain is out of control it is harder to get back under control.  It is important you are aware when your next dose of pain medication is due.  If you are admitted, your nurse may write the time of your next dose on the white board in your room to help you remember.      We are interested in your pain and encourage you to inform us about aggravating factors during your visit.   Many times a simple repositioning every few hours can make a big difference.    If your physician says it is okay, do not let your pain prevent you from getting out of bed. Be sure to call your nurse for  assistance prior to getting up so you do not fall.      Before surgery, please decide your tolerable pain goal.  These faces help describe the pain ratings we use on a 0-10 scale.   Be prepared to tell us your goal and whether or not you take pain or anxiety medications at home.          BEFORE YOU COME TO THE HOSPITAL  (Pre-op instructions)  • Do not eat, drink, smoke or chew gum after midnight the night before surgery.  This also includes no mints.  • Morning of surgery take only the medicines you have been instructed with a sip of water unless otherwise instructed  by your physician.  • Do not shave, wear makeup or dark nail polish.  • Remove all jewelry including rings.  • Leave anything you consider valuable at home.  • Leave your suitcase in the car until after your surgery.  • Bring the following with you if applicable:  o Picture ID and insurance, Medicare or Medicaid cards  o Co-pay/deductible required by insurance (cash, check, credit card)  o Copy of advance directive, living will or power-of- documents if not brought to PAT  o CPAP or BIPAP mask and tubing  o Relaxation aids ( book, magazine), etc.  o Hearing aids                        ON THE DAY OF SURGERY  · On the day of surgery check in at registration located at the main entrance of the hospital.   ? You will be registered and given a beeper with instructions where to wait in the main lobby.  ? When your beeper lights up and vibrates a member of the Outpatient Surgery staff will meet you at the double doors under the stair steps and escort you to your preoperative room.   · You may have cloth compression devices placed on your legs. These help to prevent blood clots and reduce swelling in your legs.  · An IV may be inserted into one of your veins.  · In the operating room, you may be given one or more of the following:  ? A medicine to help you relax (sedative).  ? A medicine to numb the area (local anesthetic).  ? A medicine to make you  "fall asleep (general anesthetic).  ? A medicine that is injected into an area of your body to numb everything below the injection site (regional anesthetic).  · Your surgical site will be marked or identified.  · You may be given an antibiotic through your IV to help prevent infection.  Contact a health care provider if you:  · Develop a fever of more than 100.4°F (38°C) or other feelings of illness during the 48 hours before your surgery.  · Have symptoms that get worse.  Have questions or concerns about your surgery    General Anesthesia/Surgery, Adult  General anesthesia is the use of medicines to make a person \"go to sleep\" (unconscious) for a medical procedure. General anesthesia must be used for certain procedures, and is often recommended for procedures that:  · Last a long time.  · Require you to be still or in an unusual position.  · Are major and can cause blood loss.  The medicines used for general anesthesia are called general anesthetics. As well as making you unconscious for a certain amount of time, these medicines:  · Prevent pain.  · Control your blood pressure.  · Relax your muscles.  Tell a health care provider about:  · Any allergies you have.  · All medicines you are taking, including vitamins, herbs, eye drops, creams, and over-the-counter medicines.  · Any problems you or family members have had with anesthetic medicines.  · Types of anesthetics you have had in the past.  · Any blood disorders you have.  · Any surgeries you have had.  · Any medical conditions you have.  · Any recent upper respiratory, chest, or ear infections.  · Any history of:  ? Heart or lung conditions, such as heart failure, sleep apnea, asthma, or chronic obstructive pulmonary disease (COPD).  ?  service.  ? Depression or anxiety.  · Any tobacco or drug use, including marijuana or alcohol use.  · Whether you are pregnant or may be pregnant.  What are the risks?  Generally, this is a safe procedure. However, " problems may occur, including:  · Allergic reaction.  · Lung and heart problems.  · Inhaling food or liquid from the stomach into the lungs (aspiration).  · Nerve injury.  · Air in the bloodstream, which can lead to stroke.  · Extreme agitation or confusion (delirium) when you wake up from the anesthetic.  · Waking up during your procedure and being unable to move. This is rare.  These problems are more likely to develop if you are having a major surgery or if you have an advanced or serious medical condition. You can prevent some of these complications by answering all of your health care provider's questions thoroughly and by following all instructions before your procedure.  General anesthesia can cause side effects, including:  · Nausea or vomiting.  · A sore throat from the breathing tube.  · Hoarseness.  · Wheezing or coughing.  · Shaking chills.  · Tiredness.  · Body aches.  · Anxiety.  · Sleepiness or drowsiness.  · Confusion or agitation.  RISKS AND COMPLICATIONS OF SURGERY  Your health care provider will discuss possible risks and complications with you before surgery. Common risks and complications include:    · Problems due to the use of anesthetics.  · Blood loss and replacement (does not apply to minor surgical procedures).  · Temporary increase in pain due to surgery.  · Uncorrected pain or problems that the surgery was meant to correct.  · Infection.  · New damage.    What happens before the procedure?    Medicines  Ask your health care provider about:  · Changing or stopping your regular medicines. This is especially important if you are taking diabetes medicines or blood thinners.  · Taking medicines such as aspirin and ibuprofen. These medicines can thin your blood. Do not take these medicines unless your health care provider tells you to take them.  · Taking over-the-counter medicines, vitamins, herbs, and supplements. Do not take these during the week before your procedure unless your health  care provider approves them.  General instructions  · Starting 3-6 weeks before the procedure, do not use any products that contain nicotine or tobacco, such as cigarettes and e-cigarettes. If you need help quitting, ask your health care provider.  · If you brush your teeth on the morning of the procedure, make sure to spit out all of the toothpaste.  · Tell your health care provider if you become ill or develop a cold, cough, or fever.  · If instructed by your health care provider, bring your sleep apnea device with you on the day of your surgery (if applicable).  · Ask your health care provider if you will be going home the same day, the following day, or after a longer hospital stay.  ? Plan to have someone take you home from the hospital or clinic.  ? Plan to have a responsible adult care for you for at least 24 hours after you leave the hospital or clinic. This is important.  What happens during the procedure?  · You will be given anesthetics through both of the following:  ? A mask placed over your nose and mouth.  ? An IV in one of your veins.  · You may receive a medicine to help you relax (sedative).  · After you are unconscious, a breathing tube may be inserted down your throat to help you breathe. This will be removed before you wake up.  · An anesthesia specialist will stay with you throughout your procedure. He or she will:  ? Keep you comfortable and safe by continuing to give you medicines and adjusting the amount of medicine that you get.  ? Monitor your blood pressure, pulse, and oxygen levels to make sure that the anesthetics do not cause any problems.  The procedure may vary among health care providers and hospitals.  What happens after the procedure?  · Your blood pressure, temperature, heart rate, breathing rate, and blood oxygen level will be monitored until the medicines you were given have worn off.  · You will wake up in a recovery area. You may wake up slowly.  · If you feel anxious or  agitated, you may be given medicine to help you calm down.  · If you will be going home the same day, your health care provider may check to make sure you can walk, drink, and urinate.  · Your health care provider will treat any pain or side effects you have before you go home.  · Do not drive for 24 hours if you were given a sedative.  Summary  · General anesthesia is used to keep you still and prevent pain during a procedure.  · It is important to tell your healthcare provider about your medical history and any surgeries you have had, and previous experience with anesthesia.  · Follow your healthcare provider’s instructions about when to stop eating, drinking, or taking certain medicines before your procedure.  · Plan to have someone take you home from the hospital or clinic.  This information is not intended to replace advice given to you by your health care provider. Make sure you discuss any questions you have with your health care provider.  Document Released: 03/26/2009 Document Revised: 08/03/2018 Document Reviewed: 08/03/2018  [employer] Patient Education © 2019 [employer]       Fall Prevention in Hospitals, Adult  As a hospital patient, your condition and the treatments you receive can increase your risk for falls. Some additional risk factors for falls in a hospital include:  · Being in an unfamiliar environment.  · Being on bed rest.  · Your surgery.  · Taking certain medicines.  · Your tubing requirements, such as intravenous (IV) therapy or catheters.  It is important that you learn how to decrease fall risks while at the hospital. Below are important tips that can help prevent falls.  SAFETY TIPS FOR PREVENTING FALLS  Talk about your risk of falling.  · Ask your health care provider why you are at risk for falling. Is it your medicine, illness, tubing placement, or something else?  · Make a plan with your health care provider to keep you safe from falls.  · Ask your health care provider  or pharmacist about side effects of your medicines. Some medicines can make you dizzy or affect your coordination.  Ask for help.  · Ask for help before getting out of bed. You may need to press your call button.  · Ask for assistance in getting safely to the toilet.  · Ask for a walker or cane to be put at your bedside. Ask that most of the side rails on your bed be placed up before your health care provider leaves the room.  · Ask family or friends to sit with you.  · Ask for things that are out of your reach, such as your glasses, hearing aids, telephone, bedside table, or call button.  Follow these tips to avoid falling:  · Stay lying or seated, rather than standing, while waiting for help.  · Wear rubber-soled slippers or shoes whenever you walk in the hospital.  · Avoid quick, sudden movements.  ¨ Change positions slowly.  ¨ Sit on the side of your bed before standing.  ¨ Stand up slowly and wait before you start to walk.  · Let your health care provider know if there is a spill on the floor.  · Pay careful attention to the medical equipment, electrical cords, and tubes around you.  · When you need help, use your call button by your bed or in the bathroom. Wait for one of your health care providers to help you.  · If you feel dizzy or unsure of your footing, return to bed and wait for assistance.  · Avoid being distracted by the TV, telephone, or another person in your room.  · Do not lean or support yourself on rolling objects, such as IV poles or bedside tables.     This information is not intended to replace advice given to you by your health care provider. Make sure you discuss any questions you have with your health care provider.     Document Released: 12/15/2001 Document Revised: 01/08/2016 Document Reviewed: 08/25/2013  [employer] Interactive Patient Education ©2016 [employer]             PATIENT/FAMILY/RESPONSIBLE PARTY VERBALIZES UNDERSTANDING OF ABOVE EDUCATION.  COPY OF PAIN SCALE GIVEN AND  REVIEWED WITH VERBALIZED UNDERSTANDING.

## 2020-11-13 LAB
QT INTERVAL: 394 MS
QTC INTERVAL: 390 MS

## 2020-11-16 ENCOUNTER — LAB (OUTPATIENT)
Dept: LAB | Facility: HOSPITAL | Age: 50
End: 2020-11-16

## 2020-11-16 PROCEDURE — C9803 HOPD COVID-19 SPEC COLLECT: HCPCS | Performed by: UROLOGY

## 2020-11-16 PROCEDURE — U0003 INFECTIOUS AGENT DETECTION BY NUCLEIC ACID (DNA OR RNA); SEVERE ACUTE RESPIRATORY SYNDROME CORONAVIRUS 2 (SARS-COV-2) (CORONAVIRUS DISEASE [COVID-19]), AMPLIFIED PROBE TECHNIQUE, MAKING USE OF HIGH THROUGHPUT TECHNOLOGIES AS DESCRIBED BY CMS-2020-01-R: HCPCS | Performed by: UROLOGY

## 2020-11-17 LAB
COVID LABCORP PRIORITY: NORMAL
SARS-COV-2 RNA RESP QL NAA+PROBE: NOT DETECTED

## 2020-11-19 ENCOUNTER — ANESTHESIA (OUTPATIENT)
Dept: PERIOP | Facility: HOSPITAL | Age: 50
End: 2020-11-19

## 2020-11-19 ENCOUNTER — ANESTHESIA EVENT (OUTPATIENT)
Dept: PERIOP | Facility: HOSPITAL | Age: 50
End: 2020-11-19

## 2020-11-19 ENCOUNTER — HOSPITAL ENCOUNTER (OUTPATIENT)
Facility: HOSPITAL | Age: 50
Setting detail: HOSPITAL OUTPATIENT SURGERY
Discharge: HOME | End: 2020-11-19
Attending: UROLOGY | Admitting: UROLOGY

## 2020-11-19 VITALS
TEMPERATURE: 97.7 F | OXYGEN SATURATION: 100 % | RESPIRATION RATE: 16 BRPM | SYSTOLIC BLOOD PRESSURE: 120 MMHG | HEART RATE: 63 BPM | DIASTOLIC BLOOD PRESSURE: 76 MMHG

## 2020-11-19 DIAGNOSIS — N13.8 BPH WITH OBSTRUCTION/LOWER URINARY TRACT SYMPTOMS: ICD-10-CM

## 2020-11-19 DIAGNOSIS — N40.1 BPH WITH OBSTRUCTION/LOWER URINARY TRACT SYMPTOMS: ICD-10-CM

## 2020-11-19 PROCEDURE — 25010000002 MIDAZOLAM PER 1 MG: Performed by: ANESTHESIOLOGY

## 2020-11-19 PROCEDURE — 25010000002 ONDANSETRON PER 1 MG: Performed by: NURSE ANESTHETIST, CERTIFIED REGISTERED

## 2020-11-19 PROCEDURE — 25010000002 CEFAZOLIN PER 500 MG: Performed by: UROLOGY

## 2020-11-19 PROCEDURE — 52441 CYSTO INSJ TRNSPRSTC 1 IMPLT: CPT | Performed by: UROLOGY

## 2020-11-19 PROCEDURE — C1889 IMPLANT/INSERT DEVICE, NOC: HCPCS | Performed by: UROLOGY

## 2020-11-19 PROCEDURE — 25010000002 FENTANYL CITRATE (PF) 100 MCG/2ML SOLUTION: Performed by: NURSE ANESTHETIST, CERTIFIED REGISTERED

## 2020-11-19 PROCEDURE — 25010000002 PHENYLEPHRINE HCL 0.8 MG/10ML SOLUTION PREFILLED SYRINGE: Performed by: NURSE ANESTHETIST, CERTIFIED REGISTERED

## 2020-11-19 PROCEDURE — 52442 CYSTO INS TRNSPRSTC IMPLT EA: CPT | Performed by: UROLOGY

## 2020-11-19 PROCEDURE — 25010000002 PROPOFOL 10 MG/ML EMULSION: Performed by: NURSE ANESTHETIST, CERTIFIED REGISTERED

## 2020-11-19 DEVICE — SYS UROLIFT PROSTATIC RETR: Type: IMPLANTABLE DEVICE | Site: URETER | Status: FUNCTIONAL

## 2020-11-19 RX ORDER — SODIUM CHLORIDE, SODIUM LACTATE, POTASSIUM CHLORIDE, CALCIUM CHLORIDE 600; 310; 30; 20 MG/100ML; MG/100ML; MG/100ML; MG/100ML
1000 INJECTION, SOLUTION INTRAVENOUS CONTINUOUS
Status: DISCONTINUED | OUTPATIENT
Start: 2020-11-19 | End: 2020-11-19 | Stop reason: HOSPADM

## 2020-11-19 RX ORDER — MAGNESIUM HYDROXIDE 1200 MG/15ML
LIQUID ORAL AS NEEDED
Status: DISCONTINUED | OUTPATIENT
Start: 2020-11-19 | End: 2020-11-19 | Stop reason: HOSPADM

## 2020-11-19 RX ORDER — SODIUM CHLORIDE, SODIUM LACTATE, POTASSIUM CHLORIDE, CALCIUM CHLORIDE 600; 310; 30; 20 MG/100ML; MG/100ML; MG/100ML; MG/100ML
100 INJECTION, SOLUTION INTRAVENOUS CONTINUOUS
Status: DISCONTINUED | OUTPATIENT
Start: 2020-11-19 | End: 2020-11-19 | Stop reason: HOSPADM

## 2020-11-19 RX ORDER — HYDROCODONE BITARTRATE AND ACETAMINOPHEN 5; 325 MG/1; MG/1
1 TABLET ORAL ONCE AS NEEDED
Status: DISCONTINUED | OUTPATIENT
Start: 2020-11-19 | End: 2020-11-19 | Stop reason: HOSPADM

## 2020-11-19 RX ORDER — SODIUM CHLORIDE 0.9 % (FLUSH) 0.9 %
3-10 SYRINGE (ML) INJECTION AS NEEDED
Status: DISCONTINUED | OUTPATIENT
Start: 2020-11-19 | End: 2020-11-19 | Stop reason: HOSPADM

## 2020-11-19 RX ORDER — FLUMAZENIL 0.1 MG/ML
0.2 INJECTION INTRAVENOUS AS NEEDED
Status: DISCONTINUED | OUTPATIENT
Start: 2020-11-19 | End: 2020-11-19 | Stop reason: HOSPADM

## 2020-11-19 RX ORDER — ONDANSETRON 4 MG/1
4 TABLET, FILM COATED ORAL ONCE AS NEEDED
Status: DISCONTINUED | OUTPATIENT
Start: 2020-11-19 | End: 2020-11-19 | Stop reason: HOSPADM

## 2020-11-19 RX ORDER — PHENYLEPHRINE HCL IN 0.9% NACL 0.8MG/10ML
SYRINGE (ML) INTRAVENOUS AS NEEDED
Status: DISCONTINUED | OUTPATIENT
Start: 2020-11-19 | End: 2020-11-19 | Stop reason: SURG

## 2020-11-19 RX ORDER — NALOXONE HCL 0.4 MG/ML
0.4 VIAL (ML) INJECTION AS NEEDED
Status: DISCONTINUED | OUTPATIENT
Start: 2020-11-19 | End: 2020-11-19 | Stop reason: HOSPADM

## 2020-11-19 RX ORDER — LABETALOL HYDROCHLORIDE 5 MG/ML
5 INJECTION, SOLUTION INTRAVENOUS
Status: DISCONTINUED | OUTPATIENT
Start: 2020-11-19 | End: 2020-11-19 | Stop reason: HOSPADM

## 2020-11-19 RX ORDER — BUPIVACAINE HCL/0.9 % NACL/PF 0.1 %
2 PLASTIC BAG, INJECTION (ML) EPIDURAL ONCE
Status: COMPLETED | OUTPATIENT
Start: 2020-11-19 | End: 2020-11-19

## 2020-11-19 RX ORDER — OXYCODONE AND ACETAMINOPHEN 10; 325 MG/1; MG/1
1 TABLET ORAL ONCE AS NEEDED
Status: DISCONTINUED | OUTPATIENT
Start: 2020-11-19 | End: 2020-11-19 | Stop reason: HOSPADM

## 2020-11-19 RX ORDER — SODIUM CHLORIDE 0.9 % (FLUSH) 0.9 %
3 SYRINGE (ML) INJECTION AS NEEDED
Status: DISCONTINUED | OUTPATIENT
Start: 2020-11-19 | End: 2020-11-19 | Stop reason: HOSPADM

## 2020-11-19 RX ORDER — SODIUM CHLORIDE 0.9 % (FLUSH) 0.9 %
3 SYRINGE (ML) INJECTION EVERY 12 HOURS SCHEDULED
Status: DISCONTINUED | OUTPATIENT
Start: 2020-11-19 | End: 2020-11-19 | Stop reason: HOSPADM

## 2020-11-19 RX ORDER — PROPOFOL 10 MG/ML
VIAL (ML) INTRAVENOUS AS NEEDED
Status: DISCONTINUED | OUTPATIENT
Start: 2020-11-19 | End: 2020-11-19 | Stop reason: SURG

## 2020-11-19 RX ORDER — LIDOCAINE HYDROCHLORIDE 10 MG/ML
0.5 INJECTION, SOLUTION EPIDURAL; INFILTRATION; INTRACAUDAL; PERINEURAL ONCE AS NEEDED
Status: DISCONTINUED | OUTPATIENT
Start: 2020-11-19 | End: 2020-11-19 | Stop reason: HOSPADM

## 2020-11-19 RX ORDER — PHENAZOPYRIDINE HYDROCHLORIDE 100 MG/1
100 TABLET, FILM COATED ORAL 3 TIMES DAILY PRN
Qty: 21 TABLET | Refills: 1 | Status: SHIPPED | OUTPATIENT
Start: 2020-11-19 | End: 2021-01-06

## 2020-11-19 RX ORDER — FENTANYL CITRATE 50 UG/ML
INJECTION, SOLUTION INTRAMUSCULAR; INTRAVENOUS AS NEEDED
Status: DISCONTINUED | OUTPATIENT
Start: 2020-11-19 | End: 2020-11-19 | Stop reason: SURG

## 2020-11-19 RX ORDER — MIDAZOLAM HYDROCHLORIDE 1 MG/ML
1 INJECTION INTRAMUSCULAR; INTRAVENOUS
Status: DISCONTINUED | OUTPATIENT
Start: 2020-11-19 | End: 2020-11-19 | Stop reason: HOSPADM

## 2020-11-19 RX ORDER — OXYCODONE AND ACETAMINOPHEN 7.5; 325 MG/1; MG/1
2 TABLET ORAL EVERY 4 HOURS PRN
Status: DISCONTINUED | OUTPATIENT
Start: 2020-11-19 | End: 2020-11-19 | Stop reason: HOSPADM

## 2020-11-19 RX ORDER — ONDANSETRON 2 MG/ML
INJECTION INTRAMUSCULAR; INTRAVENOUS AS NEEDED
Status: DISCONTINUED | OUTPATIENT
Start: 2020-11-19 | End: 2020-11-19 | Stop reason: SURG

## 2020-11-19 RX ORDER — IBUPROFEN 600 MG/1
600 TABLET ORAL ONCE AS NEEDED
Status: DISCONTINUED | OUTPATIENT
Start: 2020-11-19 | End: 2020-11-19 | Stop reason: HOSPADM

## 2020-11-19 RX ORDER — ACETAMINOPHEN 500 MG
1000 TABLET ORAL ONCE
Status: COMPLETED | OUTPATIENT
Start: 2020-11-19 | End: 2020-11-19

## 2020-11-19 RX ORDER — SODIUM CHLORIDE 9 MG/ML
100 INJECTION, SOLUTION INTRAVENOUS CONTINUOUS
Status: DISCONTINUED | OUTPATIENT
Start: 2020-11-19 | End: 2020-11-19 | Stop reason: HOSPADM

## 2020-11-19 RX ORDER — FENTANYL CITRATE 50 UG/ML
25 INJECTION, SOLUTION INTRAMUSCULAR; INTRAVENOUS
Status: DISCONTINUED | OUTPATIENT
Start: 2020-11-19 | End: 2020-11-19 | Stop reason: HOSPADM

## 2020-11-19 RX ORDER — ONDANSETRON 2 MG/ML
4 INJECTION INTRAMUSCULAR; INTRAVENOUS ONCE AS NEEDED
Status: DISCONTINUED | OUTPATIENT
Start: 2020-11-19 | End: 2020-11-19 | Stop reason: HOSPADM

## 2020-11-19 RX ADMIN — MIDAZOLAM 1 MG: 1 INJECTION INTRAMUSCULAR; INTRAVENOUS at 07:37

## 2020-11-19 RX ADMIN — PROPOFOL 220 MG: 10 INJECTION, EMULSION INTRAVENOUS at 07:54

## 2020-11-19 RX ADMIN — CEFAZOLIN SODIUM 2 G: 10 INJECTION, POWDER, FOR SOLUTION INTRAVENOUS at 07:14

## 2020-11-19 RX ADMIN — FENTANYL CITRATE 25 MCG: 50 INJECTION, SOLUTION INTRAMUSCULAR; INTRAVENOUS at 07:57

## 2020-11-19 RX ADMIN — LIDOCAINE HYDROCHLORIDE 40 MG: 20 INJECTION, SOLUTION INTRAVENOUS at 07:54

## 2020-11-19 RX ADMIN — SODIUM CHLORIDE, POTASSIUM CHLORIDE, SODIUM LACTATE AND CALCIUM CHLORIDE 1000 ML: 600; 310; 30; 20 INJECTION, SOLUTION INTRAVENOUS at 06:17

## 2020-11-19 RX ADMIN — ACETAMINOPHEN 1000 MG: 500 TABLET, FILM COATED ORAL at 07:25

## 2020-11-19 RX ADMIN — ONDANSETRON HYDROCHLORIDE 4 MG: 2 SOLUTION INTRAMUSCULAR; INTRAVENOUS at 08:10

## 2020-11-19 RX ADMIN — FENTANYL CITRATE 25 MCG: 50 INJECTION, SOLUTION INTRAMUSCULAR; INTRAVENOUS at 08:03

## 2020-11-19 RX ADMIN — FENTANYL CITRATE 50 MCG: 50 INJECTION, SOLUTION INTRAMUSCULAR; INTRAVENOUS at 07:54

## 2020-11-19 RX ADMIN — Medication 160 MCG: at 08:02

## 2020-11-19 NOTE — ANESTHESIA POSTPROCEDURE EVALUATION
Patient: Irwin Esparza    Procedure Summary     Date: 11/19/20 Room / Location:  [facility] OR  /  [facility]    Anesthesia Start: 0752 Anesthesia Stop: 0818    Procedure: PROSTATIC URETHRAL LIFT (N/A Ureter) Diagnosis:       BPH with obstruction/lower urinary tract symptoms      (BPH with obstruction/lower urinary tract symptoms [N40.1, N13.8])    Surgeon: Miak Garcia MD Provider: Tanja Arzola CRNA    Anesthesia Type: general ASA Status: 3          Anesthesia Type: general    Vitals  Vitals Value Taken Time   /84 11/19/20 0829   Temp 97.7 °F (36.5 °C) 11/19/20 0829   Pulse 65 11/19/20 0829   Resp 14 11/19/20 0829   SpO2 97 % 11/19/20 0829           Post Anesthesia Care and Evaluation    Patient location during evaluation: PACU  Patient participation: complete - patient participated  Level of consciousness: awake and alert  Pain management: adequate  Airway patency: patent  Anesthetic complications: No anesthetic complications    Cardiovascular status: acceptable  Respiratory status: acceptable  Hydration status: acceptable    Comments: Blood pressure 118/86, pulse 66, temperature 97.7 °F (36.5 °C), temperature source Temporal, resp. rate 16, SpO2 97 %.    Pt discharged from PACU based on alexei score >8  No anesthesia care post op

## 2020-11-19 NOTE — ANESTHESIA PREPROCEDURE EVALUATION
Anesthesia Evaluation     Patient summary reviewed and Nursing notes reviewed   no history of anesthetic complications:  NPO Solid Status: > 8 hours  NPO Liquid Status: > 8 hours           Airway   Mallampati: I  TM distance: >3 FB  Neck ROM: full  No difficulty expected  Dental      Pulmonary    (+) a smoker (quit 2019) Former, sleep apnea on CPAP,   Cardiovascular   Exercise tolerance: good (4-7 METS)    ECG reviewed  Patient on routine beta blocker and Beta blocker given within 24 hours of surgery    (+) hypertension, past MI , CAD, CABG (2019) >6 Months, hyperlipidemia,       Neuro/Psych  (+) numbness (bells palsy),     GI/Hepatic/Renal/Endo    (+) obesity,  GERD,  renal disease stones,   (-) liver disease, diabetes    Musculoskeletal     Abdominal    Substance History      OB/GYN          Other   arthritis,                      Anesthesia Plan    ASA 3     general     intravenous induction     Anesthetic plan, all risks, benefits, and alternatives have been provided, discussed and informed consent has been obtained with: patient.

## 2021-01-05 NOTE — PROGRESS NOTES
Subjective    Mr. Esparza is 50 y.o. male    Chief Complaint: BPH    History of Present Illness   Benign Prostatic Hypertrophy  Patient complains of lower urinary tract symptoms. He reports frequency, incomplete emptying, intermittency, urgency and weak stream. He denies nocturia one time a night and straining. Patient states symptoms are of moderate severity. Onset of symptoms was several years ago and was gradual in onset. His AUA Symptom Score is, 14/35.He reports a history of no complicating symptoms. He denies flank pain, gross hematuria, kidney stones and recurrent UTI.  Patient has tried Alpha blockers without improvement. Urolift 11/20 with improvement         The following portions of the patient's history were reviewed and updated as appropriate: allergies, current medications, past family history, past medical history, past social history, past surgical history and problem list.    Review of Systems   Constitutional: Negative for chills and fever.   Gastrointestinal: Negative for abdominal pain, anal bleeding and blood in stool.   Genitourinary: Positive for frequency and urgency. Negative for dysuria and hematuria.         Current Outpatient Medications:   •  acetaminophen (TYLENOL) 500 MG tablet, Take 1,000 mg by mouth 3 (Three) Times a Day., Disp: , Rfl:   •  aspirin 81 MG chewable tablet, Chew 1 tablet Daily., Disp: 30 tablet, Rfl: 0  •  atorvastatin (LIPITOR) 80 MG tablet, Take 80 mg by mouth Daily. Take 1/2 tablet by mouth every morning, Disp: , Rfl:   •  carboxymethylcellulose (REFRESH PLUS) 0.5 % solution, Administer 1 drop to both eyes As Needed., Disp: , Rfl:   •  Cholecalciferol 2000 units tablet, Take 1 tablet by mouth 2 (Two) Times a Day., Disp: , Rfl:   •  lisinopril (PRINIVIL,ZESTRIL) 10 MG tablet, Take 1 tablet by mouth Daily., Disp: 30 tablet, Rfl: 11  •  nitroglycerin (NITROSTAT) 0.4 MG SL tablet, Place 1 tablet under the tongue Every 5 (Five) Minutes As Needed for Chest Pain. Take no more  than 3 doses in 15 minutes., Disp: 30 tablet, Rfl: 12  •  pantoprazole (PROTONIX) 20 MG EC tablet, Take 20 mg by mouth 2 (Two) Times a Day. 40 MH AT NIGHT  AND 20 IN AM, Disp: , Rfl:   •  vitamin B-12 (CYANOCOBALAMIN) 500 MCG tablet, Take 500 mcg by mouth Daily., Disp: , Rfl:     Past Medical History:   Diagnosis Date   • Acid reflux    • Acid reflux    • Arthritis    • Bell's palsy    • CAD (coronary artery disease)    • CAD (coronary artery disease)    • Chest pain    • Enlarged prostate    • Hernia, ventral    • Hyperlipidemia    • Hypertension    • Kidney stone    • MI, old    • Sleep apnea        Past Surgical History:   Procedure Laterality Date   • ADENOIDECTOMY     • CARDIAC CATHETERIZATION N/A 2019    Procedure: Left Heart Cath;  Surgeon: Satinder Ahumada MD;  Location:  [facility] CATH [facility]VAS[facility];  Service: Cardiology   • CORONARY ARTERY BYPASS GRAFT N/A 2019    Procedure: CABG X 4 WITH LIMA, EVH WITH OPEN EXTENSION OF RLE, 57 CHANNEL TMR;  Surgeon: Jax Monzon MD;  Location:  [facility] OR;  Service: Cardiothoracic   • FOREIGN BODY REMOVAL     • FRACTURE SURGERY      LEFT FACIAL BONE FRACTURE REPAIR    • HERNIA REPAIR     • PROSTATE SURGERY     • REPLACEMENT TOTAL KNEE Left    • TONSILLECTOMY     • TONSILLECTOMY     • TOTAL KNEE ARTHROPLASTY         Social History     Socioeconomic History   • Marital status:      Spouse name: Not on file   • Number of children: Not on file   • Years of education: Not on file   • Highest education level: Not on file   Tobacco Use   • Smoking status: Former Smoker     Packs/day: 0.25     Types: Cigarettes     Quit date: 2019     Years since quittin.5   • Smokeless tobacco: Never Used   Substance and Sexual Activity   • Alcohol use: Yes     Comment: occ wine    • Drug use: No   • Sexual activity: Defer       Family History   Problem Relation Age of Onset   • Diabetes Mother    • Heart disease Mother        Objective    Temp 97 °F (36.1 °C)  "(Temporal)   Ht 165.1 cm (65\")   Wt 98.2 kg (216 lb 6.4 oz)   BMI 36.01 kg/m²     Physical Exam        Results for orders placed or performed in visit on 11/12/20   COVID-19,[facility] ROUTINE, NP/OP SWAB IN TRANSPORT MEDIA OR ESWAB 72 HR TAT - Swab, Oropharynx    Specimen: Oropharynx; Swab   Result Value Ref Range    SARS-CoV-2, [facility] Not Detected Not Detected   COVID [facility] Priority - Swab, Oropharynx    Specimen: Oropharynx; Swab   Result Value Ref Range    COVID [facility] PRIORITY Comment      Bladder Scan interpretation  Estimation of residual urine via abdominal ultrasound  Residual Urine: 27 ml  Indication: Post Op Urolift  Position: Supine  Examination: Incremental scanning of the suprapubic area using 3 MHz transducer using copious amounts of acoustic gel.   Findings: An anechoic area was demonstrated which represented the bladder, with measurement of residual urine as noted. I inspected this myself. In that the residual urine was stable or insignificant, no treatment will be necessary at this time.           Assessment and Plan    Diagnoses and all orders for this visit:    1. BPH with obstruction/lower urinary tract symptoms (Primary)  -     Cancel: POC Urinalysis Dipstick, Multipro      Sp Urolift 11/20 with 4 implants.  Preop AUA score 19/35, postop 14/35.  Patient pleased with results.  FU PRN.          "

## 2021-01-06 ENCOUNTER — OFFICE VISIT (OUTPATIENT)
Dept: CARDIOLOGY | Facility: CLINIC | Age: 51
End: 2021-01-06

## 2021-01-06 VITALS
OXYGEN SATURATION: 98 % | SYSTOLIC BLOOD PRESSURE: 130 MMHG | BODY MASS INDEX: 34.72 KG/M2 | DIASTOLIC BLOOD PRESSURE: 88 MMHG | HEIGHT: 66 IN | HEART RATE: 69 BPM | WEIGHT: 216 LBS

## 2021-01-06 DIAGNOSIS — Z95.1 S/P CABG (CORONARY ARTERY BYPASS GRAFT): ICD-10-CM

## 2021-01-06 DIAGNOSIS — Z78.9 BETA-BLOCKER INTOLERANCE: ICD-10-CM

## 2021-01-06 DIAGNOSIS — E66.9 OBESITY (BMI 30-39.9): ICD-10-CM

## 2021-01-06 DIAGNOSIS — I25.10 CORONARY ARTERY DISEASE INVOLVING NATIVE CORONARY ARTERY OF NATIVE HEART WITHOUT ANGINA PECTORIS: Primary | ICD-10-CM

## 2021-01-06 DIAGNOSIS — E78.2 MIXED HYPERLIPIDEMIA: ICD-10-CM

## 2021-01-06 DIAGNOSIS — I10 ESSENTIAL HYPERTENSION: ICD-10-CM

## 2021-01-06 DIAGNOSIS — G47.33 OBSTRUCTIVE SLEEP APNEA: ICD-10-CM

## 2021-01-06 PROBLEM — E78.5 HYPERLIPIDEMIA: Chronic | Status: ACTIVE | Noted: 2019-06-13

## 2021-01-06 PROBLEM — Z72.0 TOBACCO USE: Status: RESOLVED | Noted: 2019-07-01 | Resolved: 2021-01-06

## 2021-01-06 PROCEDURE — 93000 ELECTROCARDIOGRAM COMPLETE: CPT | Performed by: INTERNAL MEDICINE

## 2021-01-06 PROCEDURE — 99214 OFFICE O/P EST MOD 30 MIN: CPT | Performed by: INTERNAL MEDICINE

## 2021-01-06 RX ORDER — LISINOPRIL 10 MG/1
10 TABLET ORAL DAILY
Qty: 30 TABLET | Refills: 11 | Status: SHIPPED | OUTPATIENT
Start: 2021-01-06 | End: 2021-01-06

## 2021-01-06 RX ORDER — LISINOPRIL 10 MG/1
10 TABLET ORAL DAILY
Qty: 30 TABLET | Refills: 11 | Status: SHIPPED | OUTPATIENT
Start: 2021-01-06

## 2021-01-06 NOTE — PROGRESS NOTES
"     Subjective:     Encounter Date: 01/06/21      Patient ID: Irwin Esparza is a 50 y.o. male.    Chief Complaint:  routine f/u    History of Present Illness  50-year-old whom I first met in June 2019 when he was admitted for unstable angina. Stress test was abnormal, so I performed cardiac catheterization which revealed multivessel disease (see report below). He underwent four-vessel CABG on 6/20/2019 with Dr. Monzon, and had a very speedy inpatient recovery, being discharged home on 7/1/2019.      Since then, he has done exceptionally well.  He quit smoking, and has been compliant with all medical therapies.  He was last seen in the office in April 2020, with his only complaint then being that of \"sleepy\" all day.  He admitted he had not been using his CPAP machine.  I encouraged him to do so and discuss this further with his PCP before considering reducing any of his cardio-protective medications like the beta-blocker.  He returns today for routine follow-up.    He did start using the CPAP again, but is still \"Sleepy al lthe time.\" Also c/o ED issues.  Is noticing his blood pressure has been a little high, but would like to come off beta-blocker to see if it would help with the symptoms.    Update on chronic conditions:    HTN: readings 135-160/70s-90s.  HR 60s-90s  HL: stable; no recent labs available for review. Remains on atorva 40.  CAD: no CP, soa.  Tolerating meds.    The following portions of the patient's history were reviewed and updated as appropriate: allergies, current medications, past family history, past medical history, past social history, past surgical history and problem list.    Review of Systems   Constitution: Positive for malaise/fatigue (daytime sleepiness).   Cardiovascular: Negative for chest pain, claudication, dyspnea on exertion, leg swelling, near-syncope, orthopnea, palpitations, paroxysmal nocturnal dyspnea and syncope.   Respiratory: Negative for shortness of breath.  "   Hematologic/Lymphatic: Does not bruise/bleed easily.   Genitourinary:        +erectile dysfunction   Neurological: Positive for dizziness (when laughing too hard or turning head quickly).       Current Outpatient Medications:   •  acetaminophen (TYLENOL) 500 MG tablet, Take 1,000 mg by mouth 3 (Three) Times a Day., Disp: , Rfl:   •  aspirin 81 MG chewable tablet, Chew 1 tablet Daily., Disp: 30 tablet, Rfl: 0  •  atorvastatin (LIPITOR) 80 MG tablet, Take 80 mg by mouth Daily. Take 1/2 tablet by mouth every morning, Disp: , Rfl:   •  carboxymethylcellulose (REFRESH PLUS) 0.5 % solution, Administer 1 drop to both eyes As Needed., Disp: , Rfl:   •  Cholecalciferol 2000 units tablet, Take 1 tablet by mouth 2 (Two) Times a Day., Disp: , Rfl:   •  lisinopril (PRINIVIL,ZESTRIL) 10 MG tablet, Take 1 tablet by mouth Daily., Disp: 30 tablet, Rfl: 11  •  nitroglycerin (NITROSTAT) 0.4 MG SL tablet, Place 1 tablet under the tongue Every 5 (Five) Minutes As Needed for Chest Pain. Take no more than 3 doses in 15 minutes., Disp: 30 tablet, Rfl: 12  •  pantoprazole (PROTONIX) 20 MG EC tablet, Take 20 mg by mouth 2 (Two) Times a Day. 40 MH AT NIGHT  AND 20 IN AM, Disp: , Rfl:   •  vitamin B-12 (CYANOCOBALAMIN) 500 MCG tablet, Take 500 mcg by mouth Daily., Disp: , Rfl:        Objective:      Vitals:    01/06/21 1443   BP: 130/88   Pulse: 69   SpO2: 98%        Physical Exam   Constitutional: He is oriented to person, place, and time. He appears well-developed and well-nourished.   Neck: No JVD present.   Cardiovascular: Normal rate, regular rhythm, normal heart sounds and intact distal pulses.   No murmur heard.  Pulmonary/Chest: Effort normal and breath sounds normal.   Musculoskeletal:         General: No edema.   Neurological: He is alert and oriented to person, place, and time.   Skin: Skin is warm and dry.   Psychiatric: He has a normal mood and affect. Thought content normal.       Lab Review:   Lab Results   Component Value  Date    CHOL 159 06/14/2019    CHOL 167 07/11/2018     Lab Results   Component Value Date    TRIG 316 (H) 06/14/2019    TRIG 399 (H) 07/11/2018     Lab Results   Component Value Date    HDL 22 (L) 06/14/2019    HDL 27 (L) 07/11/2018     Lab Results   Component Value Date     (H) 06/14/2019     (H) 07/11/2018     No results found for: VLDL  Lab Results   Component Value Date    LDLHDL 3.35 06/14/2019    LDLHDL 2.23 07/11/2018             ECG 12 Lead    Date/Time: 1/6/2021 6:20 PM  Performed by: Satinder Ahumada MD  Authorized by: Satinder Ahumada MD   Comparison: compared with previous ECG from 11/12/2020  Comparison to previous ECG: Inferior Q waves no longer present.  Suspect there may have been limb lead reversal on prior ECG.  Rhythm: sinus rhythm  Rate: normal  BPM: 71  QRS axis: normal    Clinical impression: normal ECG            Results for orders placed during the hospital encounter of 06/28/19   Intra-Op [facility]    Narrative · Unremarkable intraoperative transesophageal echocardiogram.  · Left ventricular function appeared normal at the beginning of the case   and remained preserved on final images.  · No significant valvular abnormalities noted.          Cath report:          Assessment/Plan:     Problem List Items Addressed This Visit        Other    HTN (hypertension) (Chronic): Established, not well controlled    Relevant Medications    lisinopril (PRINIVIL,ZESTRIL) 10 MG tablet    Hyperlipidemia (Chronic): Established, unknown control    Coronary artery disease involving native coronary artery of native heart without angina pectoris - Primary (Chronic): Established, stable    Overview     4v CABG 6/28/19 (initial presentation: UA; abnl stress-> cath with multivessel CAD). LIMA->LAD, SVG->acute marginal->PDA (sequential), and SVG->OM.  Also had 57 channel TMR.         Obesity (BMI 30-39.9): Established, stable.  Discussed need for caloric restriction and increased activity.    Obstructive  sleep apnea: Established, improved now than on treatment with CPAP    S/P CABG (coronary artery bypass graft)    Beta-blocker intolerance: New issue, requiring discontinuation of medication as outlined below             Recommendations/plans:    -Continue aspirin 81mg daily  -Continue high intensity atorvastatin (40mg); Dr. Cobb is following this.  Goal LDL less than 70.  If this is not achieved on current dose atorvastatin, could either increase dose to 80mg qhs, add zetia, or initiate PCSK9 inhibitor therapy.  -Continue ACE inhibitor but increased dose to 10mg daily (more on this below)  -ok to stop BB d/t fatigue and side effect of erectile dysfunction.  We discussed that this would likely cause an increase in his blood pressure, which is already slightly elevated.  For this reason, I did preemptively increase his lisinopril from 2.5 up to 10 mg daily, and also asked him to keep a close eye on his blood pressure at home.  Asked him to call us back if he continues to maintain an average blood pressure above goal (<140/<90).  -Discussed caloric restriction as primary means of weight loss, but also encouraged to increase his aerobic activity.  Also encouraged calorie counting by use of a smart phone misha.    F/u 6 months, or sooner with any new or worsening symptoms    Satinder Ahumada MD  01/06/21  18:23 CST

## 2021-01-07 ENCOUNTER — OFFICE VISIT (OUTPATIENT)
Dept: UROLOGY | Facility: CLINIC | Age: 51
End: 2021-01-07

## 2021-01-07 VITALS — WEIGHT: 216.4 LBS | HEIGHT: 65 IN | BODY MASS INDEX: 36.06 KG/M2 | TEMPERATURE: 97 F

## 2021-01-07 DIAGNOSIS — N13.8 BPH WITH OBSTRUCTION/LOWER URINARY TRACT SYMPTOMS: Primary | ICD-10-CM

## 2021-01-07 DIAGNOSIS — N40.1 BPH WITH OBSTRUCTION/LOWER URINARY TRACT SYMPTOMS: Primary | ICD-10-CM

## 2021-01-07 PROCEDURE — 99212 OFFICE O/P EST SF 10 MIN: CPT | Performed by: UROLOGY

## 2021-07-07 ENCOUNTER — OFFICE VISIT (OUTPATIENT)
Dept: CARDIOLOGY | Facility: CLINIC | Age: 51
End: 2021-07-07

## 2021-07-07 VITALS
DIASTOLIC BLOOD PRESSURE: 98 MMHG | SYSTOLIC BLOOD PRESSURE: 141 MMHG | BODY MASS INDEX: 35.16 KG/M2 | HEIGHT: 65 IN | OXYGEN SATURATION: 98 % | HEART RATE: 81 BPM | WEIGHT: 211 LBS

## 2021-07-07 DIAGNOSIS — I10 ESSENTIAL HYPERTENSION: Chronic | ICD-10-CM

## 2021-07-07 DIAGNOSIS — Z95.1 S/P CABG (CORONARY ARTERY BYPASS GRAFT): ICD-10-CM

## 2021-07-07 DIAGNOSIS — Z78.9 BETA-BLOCKER INTOLERANCE: ICD-10-CM

## 2021-07-07 DIAGNOSIS — E78.2 MIXED HYPERLIPIDEMIA: Chronic | ICD-10-CM

## 2021-07-07 DIAGNOSIS — I25.10 CORONARY ARTERY DISEASE INVOLVING NATIVE CORONARY ARTERY OF NATIVE HEART WITHOUT ANGINA PECTORIS: Primary | Chronic | ICD-10-CM

## 2021-07-07 PROCEDURE — 99214 OFFICE O/P EST MOD 30 MIN: CPT | Performed by: INTERNAL MEDICINE

## 2021-07-07 PROCEDURE — 93000 ELECTROCARDIOGRAM COMPLETE: CPT | Performed by: INTERNAL MEDICINE

## 2021-07-07 NOTE — PROGRESS NOTES
"     Subjective:     Encounter Date: 07/07/21      Patient ID: Irwin Esparza is a 50 y.o. male.    Chief Complaint:  routine f/u    History of Present Illness  50-year-old whom I first met in June 2019 when he was admitted for unstable angina. Stress test was abnormal, so I performed cardiac catheterization which revealed multivessel disease (see report below). He underwent four-vessel CABG on 6/20/2019 with Dr. Monzon, and had a very speedy inpatient recovery, being discharged home on 7/1/2019.  Since then, he has done exceptionally well.  He quit smoking, and has been compliant with all medical therapies.  He was seen in the office in April 2020, with his only complaint then being that of \"sleepy\" all day.  He admitted he had not been using his CPAP machine.  I encouraged him to do so and discuss this further with his PCP before considering reducing any of his cardio-protective medications like the beta-blocker.  I then saw him in routine follow-up in January 2021.  He then told me that he had started using the CPAP again, but was still \"Sleepy al lthe time.\" Also c/o ED issues.  Is noticing his blood pressure has been a little high, but would like to come off beta-blocker to see if it would help with the symptoms.  I did advise him to stop his beta-blocker to see if this would improve his somnolence and erectile dysfunction, but advised to keep a close eye on blood pressure as that may then require increase in some of his other antihypertensive medications.  He returns today for routine follow-up.    Update on chronic conditions:   ED got better with stopping BB; still 'sleepy' all the time but says he's not been using CPAP    HTN: readings 130s/60s-80s.   Did not take lisinopril this morning.  HL: stable; no recent labs available for review. Remains on atorva 40.  CAD: no CP, soa.  Tolerating meds.    The following portions of the patient's history were reviewed and updated as appropriate: allergies, current " medications, past family history, past medical history, past social history, past surgical history and problem list.    Review of Systems   Constitutional: Positive for malaise/fatigue (daytime sleepiness).   Cardiovascular: Negative for chest pain, claudication, dyspnea on exertion, leg swelling, near-syncope, orthopnea, palpitations, paroxysmal nocturnal dyspnea and syncope.   Respiratory: Negative for shortness of breath.    Hematologic/Lymphatic: Does not bruise/bleed easily.   Neurological: Negative for dizziness.       Current Outpatient Medications:   •  acetaminophen (TYLENOL) 500 MG tablet, Take 1,000 mg by mouth 3 (Three) Times a Day., Disp: , Rfl:   •  aspirin 81 MG chewable tablet, Chew 1 tablet Daily., Disp: 30 tablet, Rfl: 0  •  atorvastatin (LIPITOR) 80 MG tablet, Take 80 mg by mouth Daily. Take 1/2 tablet by mouth every morning, Disp: , Rfl:   •  carboxymethylcellulose (REFRESH PLUS) 0.5 % solution, Administer 1 drop to both eyes As Needed., Disp: , Rfl:   •  Cholecalciferol 2000 units tablet, Take 1 tablet by mouth 2 (Two) Times a Day., Disp: , Rfl:   •  lisinopril (PRINIVIL,ZESTRIL) 10 MG tablet, Take 1 tablet by mouth Daily., Disp: 30 tablet, Rfl: 11  •  nitroglycerin (NITROSTAT) 0.4 MG SL tablet, Place 1 tablet under the tongue Every 5 (Five) Minutes As Needed for Chest Pain. Take no more than 3 doses in 15 minutes., Disp: 30 tablet, Rfl: 12  •  pantoprazole (PROTONIX) 20 MG EC tablet, Take 20 mg by mouth 2 (Two) Times a Day. 40 MH AT NIGHT  AND 20 IN AM, Disp: , Rfl:   •  vitamin B-12 (CYANOCOBALAMIN) 500 MCG tablet, Take 500 mcg by mouth Daily., Disp: , Rfl:        Objective:      Vitals:    07/07/21 1503   BP: 141/98   Pulse: 81   SpO2: 98%        Physical Exam  Constitutional:       Appearance: He is well-developed.   Neck:      Vascular: No JVD.   Cardiovascular:      Rate and Rhythm: Normal rate and regular rhythm.      Pulses: Intact distal pulses.      Heart sounds: Normal heart sounds.  No murmur heard.     Pulmonary:      Effort: Pulmonary effort is normal.      Breath sounds: Normal breath sounds.   Skin:     General: Skin is warm and dry.   Neurological:      Mental Status: He is alert and oriented to person, place, and time.   Psychiatric:         Thought Content: Thought content normal.         Lab Review:   Lab Results   Component Value Date    CHOL 159 06/14/2019    CHOL 167 07/11/2018     Lab Results   Component Value Date    TRIG 316 (H) 06/14/2019    TRIG 399 (H) 07/11/2018     Lab Results   Component Value Date    HDL 22 (L) 06/14/2019    HDL 27 (L) 07/11/2018     Lab Results   Component Value Date     (H) 06/14/2019     (H) 07/11/2018     No results found for: VLDL  Lab Results   Component Value Date    LDLHDL 3.35 06/14/2019    LDLHDL 2.23 07/11/2018             ECG 12 Lead    Date/Time: 7/7/2021 3:09 PM  Performed by: Satinder Ahumada MD  Authorized by: Satinder Ahumada MD   Comparison: compared with previous ECG from 1/6/2021  Similar to previous ECG  Rhythm: sinus rhythm    Clinical impression: normal ECG            Results for orders placed during the hospital encounter of 06/28/19    Intra-Op [facility]    Interpretation Summary  · Unremarkable intraoperative transesophageal echocardiogram.  · Left ventricular function appeared normal at the beginning of the case and remained preserved on final images.  · No significant valvular abnormalities noted.      Cath report:          Assessment/Plan:     Problem List Items Addressed This Visit        Other    HTN (hypertension) (Chronic): Established, reasonably well controlled    Relevant Medications    lisinopril (PRINIVIL,ZESTRIL) 10 MG tablet    Hyperlipidemia (Chronic): Established, unknown control    Coronary artery disease involving native coronary artery of native heart without angina pectoris - Primary (Chronic): Established, stable    Overview     4v CABG 6/28/19 (initial presentation: UA; abnl stress-> cath with  multivessel CAD). LIMA->LAD, SVG->acute marginal->PDA (sequential), and SVG->OM.  Also had 57 channel TMR.         Obesity (BMI 30-39.9): Established, stable.      Obstructive sleep apnea: Established, still not compliant with CPAP therapy and, as result, complaining of daytime somnolence    S/P CABG (coronary artery bypass graft): Stable             Recommendations/plans:    -Continue aspirin 81mg daily  -Continue high intensity atorvastatin (40mg); Dr. Cobb is following this.  Goal LDL less than 70.  If this is not achieved on current dose atorvastatin, could either increase dose to 80mg qhs, add zetia, or initiate PCSK9 inhibitor therapy.  -Continue ACE inhibitor (lisinopril 10 mg daily)  -Reminded to use CPAP for treatment of sleep apnea, and, more tangibly, to improve symptoms of daytime sleepiness    F/u 1 year, or sooner with any new or worsening symptoms    Satinder Ahumada MD  07/07/21  15:24 CDT

## 2022-02-03 NOTE — PROGRESS NOTES
Chief Complaint   Patient presents with   • GI Problem     getting choked with acid never had endo       PCP: Krish Cobb MD  REFER: Mariah Julio APRN    Subjective     HPI    Irwin Esparza is a 51 y.o. male who presents to office for preventative maintenance.  There is not  a personal history of colon polyps.  There is not a history of colon cancer.  He does not have complaints of nausea/vomiting, change in bowels, weight loss, no BRBPR, no melena.  There is not a family history of colon cancer in first degree relative.  There is not a family history of colon polyps in first degree relative.  His last colonoscopy-no previous colonoscopy .  Bowels do move on regular basis.    Irwin Esparza reports increased acid reflux despite daily compliance with Protonix 20 mg in evening and 40 mg in morning.  Reflux symptoms present for many years.  No dysphagia.  No weight loss.   No previous EGD.       Past Medical History:   Diagnosis Date   • Acid reflux    • Acid reflux    • Arthritis    • Bell's palsy    • CAD (coronary artery disease)    • CAD (coronary artery disease)    • Chest pain    • Enlarged prostate    • Hernia, ventral    • Hyperlipidemia    • Hypertension    • Kidney stone    • MI, old    • Sleep apnea      Past Surgical History:   Procedure Laterality Date   • ADENOIDECTOMY     • CARDIAC CATHETERIZATION N/A 6/14/2019    Procedure: Left Heart Cath;  Surgeon: Satinder Ahumada MD;  Location:  [facility];  Service: Cardiology   • CORONARY ARTERY BYPASS GRAFT N/A 6/28/2019    Procedure: CABG X 4 WITH LIMA, EVH WITH OPEN EXTENSION OF RLE, 57 CHANNEL TMR;  Surgeon: Jax Monzon MD;  Location:  [facility] OR;  Service: Cardiothoracic   • FOREIGN BODY REMOVAL     • FRACTURE SURGERY      LEFT FACIAL BONE FRACTURE REPAIR    • HERNIA REPAIR     • PROSTATE SURGERY     • REPLACEMENT TOTAL KNEE Left    • TONSILLECTOMY     • TONSILLECTOMY     • TOTAL KNEE ARTHROPLASTY       Outpatient Medications  Marked as Taking for the 22 encounter (Office Visit) with Sean Quintana APRN   Medication Sig Dispense Refill   • acetaminophen (TYLENOL) 500 MG tablet Take 1,000 mg by mouth 3 (Three) Times a Day.     • aspirin 81 MG chewable tablet Chew 1 tablet Daily. 30 tablet 0   • atorvastatin (LIPITOR) 80 MG tablet Take 80 mg by mouth Daily. Take 1/2 tablet by mouth every morning     • carboxymethylcellulose (REFRESH PLUS) 0.5 % solution Administer 1 drop to both eyes As Needed.     • Cholecalciferol 2000 units tablet Take 1 tablet by mouth 2 (Two) Times a Day.     • diphenhydrAMINE (BENADRYL) 25 mg capsule Take 25 mg by mouth Every 6 (Six) Hours As Needed for Itching.     • glucosamine-chondroitin 500-400 MG capsule capsule Take  by mouth 2 (Two) Times a Day.     • lisinopril (PRINIVIL,ZESTRIL) 10 MG tablet Take 1 tablet by mouth Daily. 30 tablet 11   • nitroglycerin (NITROSTAT) 0.4 MG SL tablet Place 1 tablet under the tongue Every 5 (Five) Minutes As Needed for Chest Pain. Take no more than 3 doses in 15 minutes. 30 tablet 12   • pantoprazole (PROTONIX) 20 MG EC tablet Take 20 mg by mouth Daily. 40 MH AT NIGHT  AND 20 IN AM     • pantoprazole (PROTONIX) 40 MG EC tablet Take 40 mg by mouth Daily.     • valACYclovir (VALTREX) 1000 MG tablet TAKE 2 TABLETS BY MOUTH TWICE DAILY FOR 1 DOSE AS NEEDED FOR COLD SORES     • vitamin B-12 (CYANOCOBALAMIN) 500 MCG tablet Take 500 mcg by mouth Daily.     • Zinc 50 MG capsule Take  by mouth.       No Known Allergies  Social History     Socioeconomic History   • Marital status:    Tobacco Use   • Smoking status: Former Smoker     Packs/day: 0.25     Types: Cigarettes     Quit date: 2019     Years since quittin.6   • Smokeless tobacco: Never Used   Vaping Use   • Vaping Use: Never used   Substance and Sexual Activity   • Alcohol use: Yes     Comment: occ wine    • Drug use: No   • Sexual activity: Defer     Review of Systems   Constitutional: Negative for  unexpected weight change.   Respiratory: Negative for shortness of breath.    Cardiovascular: Negative for chest pain.   Gastrointestinal: Negative for abdominal pain and anal bleeding.     Objective   Vitals:    02/07/22 0954   BP: 136/88   Pulse: 70   Temp: 97 °F (36.1 °C)     Physical Exam  Constitutional:       Appearance: Normal appearance. He is well-developed.   Eyes:      General: No scleral icterus.  Cardiovascular:      Rate and Rhythm: Regular rhythm.      Heart sounds: Normal heart sounds. No murmur heard.      Pulmonary:      Effort: Pulmonary effort is normal. No accessory muscle usage.      Breath sounds: Normal breath sounds.   Abdominal:      General: Bowel sounds are normal. There is no distension.      Palpations: Abdomen is soft. There is no mass.      Tenderness: There is no abdominal tenderness. There is no guarding or rebound.   Skin:     General: Skin is warm and dry.      Coloration: Skin is not jaundiced.   Neurological:      Mental Status: He is alert.   Psychiatric:         Behavior: Behavior is cooperative.       Imaging Results (Most Recent)     None        Body mass index is 35.45 kg/m².    Assessment/Plan   Diagnoses and all orders for this visit:    1. Gastroesophageal reflux disease, unspecified whether esophagitis present (Primary)  -     Case Request; Standing  -     Case Request    2. Encounter for screening for malignant neoplasm of colon  -     Case Request; Standing  -     Case Request    Other orders  -     polyethylene glycol (MIRALAX) 17 GM/SCOOP powder; Take 289 g by mouth 1 (One) Time for 1 dose.  Dispense: 289 g; Refill: 0      COLONOSCOPY WITH ANESTHESIA (N/A), ESOPHAGOGASTRODUODENOSCOPY WITH ANESTHESIA (N/A)    Take PPI 30 min prior to breakfast   Decrease caffeine, nicotine, etoh- all contribute to acid reflux  Do not eat 2-3 hours within lying down, elevated HOB 4-6 inches     Patient is to continue all blood pressure and cardiac medications prior to procedure and  has been advised to take medications morning of procedure   Pt verbalized understanding       Advised pt to stop use of NSAIDs, Fish Oil, and MV 5 days prior to procedure, per Dr Philip protocol.  Tylenol based products are ok to take.  Pt verbalized understanding.     All risks, benefits, alternatives, and indications of colonoscopy procedure have been discussed with the patient. Risks to include perforation of the colon requiring possible surgery or colostomy, risk of bleeding from biopsies or removal of colon tissue, possibility of missing a colon polyp or cancer, or adverse drug reaction.  Benefits to include the diagnosis and management of disease of the colon and rectum. Alternatives to include barium enema, radiographic evaluation, lab testing or no intervention. He verbalizes understanding and agrees.     The risk of the endoscopy were discussed in detail.  We discussed the risk of perforation (one out of [id number], riskier with dilation), bleeding (one out of 500), and the rare risks of infection, adverse reaction to anesthesia, respiratory failure, cardiac failure including MI and adverse reaction to medications, etc.  We discussed consequences that could occur if a risk were to develop such as the need for hospitalization, blood transfusion, surgical intervention, medications, pain and disability and death.  Alternatives include not doing anything, or pursuing an UGI series which only offers a diagnosis with potential less accuracy compared to egd.  The patient verbalizes understanding and agrees to proceed.  Precautions are currently being put in place due to COVID-19.  I have explained to Irwin Esparza they will be required to undergo COVID testing prior to their EGD.  Irwin Esparza verbalized understanding and was willing to proceed.            Sean Quintana, APRN  02/08/22        Patient Instructions   Gastroesophageal Reflux Disease, Adult    Gastroesophageal reflux disease (GERD) happens  when acid from your stomach flows up into the esophagus. When acid comes in contact with the esophagus, the acid causes soreness (inflammation) in the esophagus. Over time, GERD may create small holes (ulcers) in the lining of the esophagus.  CAUSES    · Increased body weight. This puts pressure on the stomach, making acid rise from the stomach into the esophagus.  · Smoking. This increases acid production in the stomach.  · Drinking alcohol. This causes decreased pressure in the lower esophageal sphincter (valve or ring of muscle between the esophagus and stomach), allowing acid from the stomach into the esophagus.  · Late evening meals and a full stomach. This increases pressure and acid production in the stomach.  · A malformed lower esophageal sphincter.  Sometimes, no cause is found.  SYMPTOMS    · Burning pain in the lower part of the mid-chest behind the breastbone and in the mid-stomach area. This may occur twice a week or more often.  · Trouble swallowing.  · Sore throat.  · Dry cough.  · Asthma-like symptoms including chest tightness, shortness of breath, or wheezing.  DIAGNOSIS    Your caregiver may be able to diagnose GERD based on your symptoms. In some cases, X-rays and other tests may be done to check for complications or to check the condition of your stomach and esophagus.  TREATMENT    Your caregiver may recommend over-the-counter or prescription medicines to help decrease acid production. Ask your caregiver before starting or adding any new medicines.    HOME CARE INSTRUCTIONS    · Change the factors that you can control. Ask your caregiver for guidance concerning weight loss, quitting smoking, and alcohol consumption.  · Avoid foods and drinks that make your symptoms worse, such as:  ¨ Caffeine or alcoholic drinks.  ¨ Chocolate.  ¨ Peppermint or mint flavorings.  ¨ Garlic and onions.  ¨ Spicy foods.  ¨ Citrus fruits, such as oranges, bernard, or limes.  ¨ Tomato-based foods such as sauce, chili,  salsa, and pizza.  ¨ Fried and fatty foods.  · Avoid lying down for the 3 hours prior to your bedtime or prior to taking a nap.  · Eat small, frequent meals instead of large meals.  · Wear loose-fitting clothing. Do not wear anything tight around your waist that causes pressure on your stomach.  · Raise the head of your bed 6 to 8 inches with wood blocks to help you sleep. Extra pillows will not help.  · Only take over-the-counter or prescription medicines for pain, discomfort, or fever as directed by your caregiver.  · Do not take aspirin, ibuprofen, or other nonsteroidal anti-inflammatory drugs (NSAIDs).  SEEK IMMEDIATE MEDICAL CARE IF:    · You have pain in your arms, neck, jaw, teeth, or back.  · Your pain increases or changes in intensity or duration.  · You develop nausea, vomiting, or sweating (diaphoresis).  · You develop shortness of breath, or you faint.  · Your vomit is green, yellow, black, or looks like coffee grounds or blood.  · Your stool is red, bloody, or black.  These symptoms could be signs of other problems, such as heart disease, gastric bleeding, or esophageal bleeding.  MAKE SURE YOU:    · Understand these instructions.  · Will watch your condition.  · Will get help right away if you are not doing well or get worse.     This information is not intended to replace advice given to you by your health care provider. Make sure you discuss any questions you have with your health care provider.     Document Released: 09/27/2006 Document Revised: 01/08/2016 Document Reviewed: 04/13/2016  [employer] Interactive Patient Education ©2016 [employer]

## 2022-02-07 ENCOUNTER — OFFICE VISIT (OUTPATIENT)
Dept: GASTROENTEROLOGY | Facility: CLINIC | Age: 52
End: 2022-02-07

## 2022-02-07 VITALS
TEMPERATURE: 97 F | DIASTOLIC BLOOD PRESSURE: 88 MMHG | WEIGHT: 213 LBS | SYSTOLIC BLOOD PRESSURE: 136 MMHG | BODY MASS INDEX: 35.49 KG/M2 | HEIGHT: 65 IN | HEART RATE: 70 BPM

## 2022-02-07 DIAGNOSIS — K21.9 GASTROESOPHAGEAL REFLUX DISEASE, UNSPECIFIED WHETHER ESOPHAGITIS PRESENT: Primary | ICD-10-CM

## 2022-02-07 DIAGNOSIS — Z12.11 ENCOUNTER FOR SCREENING FOR MALIGNANT NEOPLASM OF COLON: ICD-10-CM

## 2022-02-07 DIAGNOSIS — Z01.818 PREOPERATIVE TESTING: Primary | ICD-10-CM

## 2022-02-07 PROCEDURE — 99214 OFFICE O/P EST MOD 30 MIN: CPT | Performed by: NURSE PRACTITIONER

## 2022-02-07 RX ORDER — DIPHENHYDRAMINE HCL 25 MG
25 CAPSULE ORAL EVERY 6 HOURS PRN
COMMUNITY
End: 2022-07-13

## 2022-02-07 RX ORDER — VALACYCLOVIR HYDROCHLORIDE 1 G/1
TABLET, FILM COATED ORAL
COMMUNITY
Start: 2021-11-16

## 2022-02-07 RX ORDER — SODIUM PHOSPHATE,MONO-DIBASIC 19G-7G/118
ENEMA (ML) RECTAL 2 TIMES DAILY
COMMUNITY

## 2022-02-07 RX ORDER — POLYETHYLENE GLYCOL 3350 17 G/17G
289 POWDER, FOR SOLUTION ORAL ONCE
Qty: 289 G | Refills: 0 | Status: SHIPPED | OUTPATIENT
Start: 2022-02-07 | End: 2022-02-07

## 2022-02-07 RX ORDER — PANTOPRAZOLE SODIUM 40 MG/1
40 TABLET, DELAYED RELEASE ORAL DAILY
COMMUNITY
End: 2022-07-11

## 2022-02-07 NOTE — PATIENT INSTRUCTIONS
Gastroesophageal Reflux Disease, Adult    Gastroesophageal reflux disease (GERD) happens when acid from your stomach flows up into the esophagus. When acid comes in contact with the esophagus, the acid causes soreness (inflammation) in the esophagus. Over time, GERD may create small holes (ulcers) in the lining of the esophagus.  CAUSES    · Increased body weight. This puts pressure on the stomach, making acid rise from the stomach into the esophagus.  · Smoking. This increases acid production in the stomach.  · Drinking alcohol. This causes decreased pressure in the lower esophageal sphincter (valve or ring of muscle between the esophagus and stomach), allowing acid from the stomach into the esophagus.  · Late evening meals and a full stomach. This increases pressure and acid production in the stomach.  · A malformed lower esophageal sphincter.  Sometimes, no cause is found.  SYMPTOMS    · Burning pain in the lower part of the mid-chest behind the breastbone and in the mid-stomach area. This may occur twice a week or more often.  · Trouble swallowing.  · Sore throat.  · Dry cough.  · Asthma-like symptoms including chest tightness, shortness of breath, or wheezing.  DIAGNOSIS    Your caregiver may be able to diagnose GERD based on your symptoms. In some cases, X-rays and other tests may be done to check for complications or to check the condition of your stomach and esophagus.  TREATMENT    Your caregiver may recommend over-the-counter or prescription medicines to help decrease acid production. Ask your caregiver before starting or adding any new medicines.    HOME CARE INSTRUCTIONS    · Change the factors that you can control. Ask your caregiver for guidance concerning weight loss, quitting smoking, and alcohol consumption.  · Avoid foods and drinks that make your symptoms worse, such as:  ¨ Caffeine or alcoholic drinks.  ¨ Chocolate.  ¨ Peppermint or mint flavorings.  ¨ Garlic and onions.  ¨ Spicy foods.  ¨ Citrus  fruits, such as oranges, bernard, or limes.  ¨ Tomato-based foods such as sauce, chili, salsa, and pizza.  ¨ Fried and fatty foods.  · Avoid lying down for the 3 hours prior to your bedtime or prior to taking a nap.  · Eat small, frequent meals instead of large meals.  · Wear loose-fitting clothing. Do not wear anything tight around your waist that causes pressure on your stomach.  · Raise the head of your bed 6 to 8 inches with wood blocks to help you sleep. Extra pillows will not help.  · Only take over-the-counter or prescription medicines for pain, discomfort, or fever as directed by your caregiver.  · Do not take aspirin, ibuprofen, or other nonsteroidal anti-inflammatory drugs (NSAIDs).  SEEK IMMEDIATE MEDICAL CARE IF:    · You have pain in your arms, neck, jaw, teeth, or back.  · Your pain increases or changes in intensity or duration.  · You develop nausea, vomiting, or sweating (diaphoresis).  · You develop shortness of breath, or you faint.  · Your vomit is green, yellow, black, or looks like coffee grounds or blood.  · Your stool is red, bloody, or black.  These symptoms could be signs of other problems, such as heart disease, gastric bleeding, or esophageal bleeding.  MAKE SURE YOU:    · Understand these instructions.  · Will watch your condition.  · Will get help right away if you are not doing well or get worse.     This information is not intended to replace advice given to you by your health care provider. Make sure you discuss any questions you have with your health care provider.     Document Released: 09/27/2006 Document Revised: 01/08/2016 Document Reviewed: 04/13/2016  [employer] Interactive Patient Education ©2016 [employer]

## 2022-02-22 ENCOUNTER — LAB (OUTPATIENT)
Dept: LAB | Facility: HOSPITAL | Age: 52
End: 2022-02-22

## 2022-02-22 LAB — SARS-COV-2 ORF1AB RESP QL NAA+PROBE: NOT DETECTED

## 2022-02-22 PROCEDURE — C9803 HOPD COVID-19 SPEC COLLECT: HCPCS | Performed by: NURSE PRACTITIONER

## 2022-02-22 PROCEDURE — U0004 COV-19 TEST NON-CDC HGH THRU: HCPCS | Performed by: NURSE PRACTITIONER

## 2022-02-25 ENCOUNTER — ANESTHESIA (OUTPATIENT)
Dept: GASTROENTEROLOGY | Facility: HOSPITAL | Age: 52
End: 2022-02-25

## 2022-02-25 ENCOUNTER — ANESTHESIA EVENT (OUTPATIENT)
Dept: GASTROENTEROLOGY | Facility: HOSPITAL | Age: 52
End: 2022-02-25

## 2022-02-25 ENCOUNTER — HOSPITAL ENCOUNTER (OUTPATIENT)
Facility: HOSPITAL | Age: 52
Setting detail: HOSPITAL OUTPATIENT SURGERY
Discharge: HOME | End: 2022-02-25
Attending: INTERNAL MEDICINE | Admitting: INTERNAL MEDICINE

## 2022-02-25 ENCOUNTER — TELEPHONE (OUTPATIENT)
Dept: GASTROENTEROLOGY | Facility: CLINIC | Age: 52
End: 2022-02-25

## 2022-02-25 VITALS
TEMPERATURE: 97.4 F | HEART RATE: 89 BPM | OXYGEN SATURATION: 95 % | DIASTOLIC BLOOD PRESSURE: 79 MMHG | WEIGHT: 204 LBS | RESPIRATION RATE: 20 BRPM | SYSTOLIC BLOOD PRESSURE: 138 MMHG | HEIGHT: 65 IN | BODY MASS INDEX: 33.99 KG/M2

## 2022-02-25 DIAGNOSIS — Z12.11 ENCOUNTER FOR SCREENING FOR MALIGNANT NEOPLASM OF COLON: ICD-10-CM

## 2022-02-25 DIAGNOSIS — K21.9 GASTROESOPHAGEAL REFLUX DISEASE, UNSPECIFIED WHETHER ESOPHAGITIS PRESENT: ICD-10-CM

## 2022-02-25 PROCEDURE — 45385 COLONOSCOPY W/LESION REMOVAL: CPT | Performed by: INTERNAL MEDICINE

## 2022-02-25 PROCEDURE — 43239 EGD BIOPSY SINGLE/MULTIPLE: CPT | Performed by: INTERNAL MEDICINE

## 2022-02-25 PROCEDURE — 25010000002 PROPOFOL 10 MG/ML EMULSION: Performed by: NURSE ANESTHETIST, CERTIFIED REGISTERED

## 2022-02-25 PROCEDURE — 88305 TISSUE EXAM BY PATHOLOGIST: CPT | Performed by: INTERNAL MEDICINE

## 2022-02-25 PROCEDURE — 87081 CULTURE SCREEN ONLY: CPT | Performed by: INTERNAL MEDICINE

## 2022-02-25 RX ORDER — LIDOCAINE HYDROCHLORIDE 20 MG/ML
INJECTION, SOLUTION EPIDURAL; INFILTRATION; INTRACAUDAL; PERINEURAL AS NEEDED
Status: DISCONTINUED | OUTPATIENT
Start: 2022-02-25 | End: 2022-02-25 | Stop reason: SURG

## 2022-02-25 RX ORDER — LIDOCAINE HYDROCHLORIDE 10 MG/ML
0.5 INJECTION, SOLUTION EPIDURAL; INFILTRATION; INTRACAUDAL; PERINEURAL ONCE AS NEEDED
Status: DISCONTINUED | OUTPATIENT
Start: 2022-02-25 | End: 2022-02-25 | Stop reason: HOSPADM

## 2022-02-25 RX ORDER — SODIUM CHLORIDE 9 MG/ML
500 INJECTION, SOLUTION INTRAVENOUS CONTINUOUS PRN
Status: DISCONTINUED | OUTPATIENT
Start: 2022-02-25 | End: 2022-02-25 | Stop reason: HOSPADM

## 2022-02-25 RX ORDER — SODIUM CHLORIDE 0.9 % (FLUSH) 0.9 %
10 SYRINGE (ML) INJECTION AS NEEDED
Status: DISCONTINUED | OUTPATIENT
Start: 2022-02-25 | End: 2022-02-25 | Stop reason: HOSPADM

## 2022-02-25 RX ORDER — PROPOFOL 10 MG/ML
VIAL (ML) INTRAVENOUS AS NEEDED
Status: DISCONTINUED | OUTPATIENT
Start: 2022-02-25 | End: 2022-02-25 | Stop reason: SURG

## 2022-02-25 RX ADMIN — PROPOFOL 100 MG: 10 INJECTION, EMULSION INTRAVENOUS at 10:26

## 2022-02-25 RX ADMIN — PROPOFOL 100 MG: 10 INJECTION, EMULSION INTRAVENOUS at 10:20

## 2022-02-25 RX ADMIN — SODIUM CHLORIDE 500 ML: 9 INJECTION, SOLUTION INTRAVENOUS at 09:10

## 2022-02-25 RX ADMIN — LIDOCAINE HYDROCHLORIDE 100 MG: 20 INJECTION, SOLUTION EPIDURAL; INFILTRATION; INTRACAUDAL; PERINEURAL at 10:19

## 2022-02-25 RX ADMIN — PROPOFOL 100 MG: 10 INJECTION, EMULSION INTRAVENOUS at 10:31

## 2022-02-25 NOTE — ANESTHESIA PREPROCEDURE EVALUATION
Anesthesia Evaluation     Patient summary reviewed   no history of anesthetic complications:  NPO Solid Status: > 8 hours             Airway   Mallampati: II  Dental      Pulmonary    (+) sleep apnea on CPAP,   Cardiovascular   Exercise tolerance: excellent (>7 METS)    (+) hypertension, CAD, CABG (2019), hyperlipidemia,   (-) angina      Neuro/Psych- negative ROS  GI/Hepatic/Renal/Endo    (+) obesity,  GERD,      Musculoskeletal     Abdominal    Substance History      OB/GYN          Other                        Anesthesia Plan    ASA 3     MAC       Anesthetic plan, all risks, benefits, and alternatives have been provided, discussed and informed consent has been obtained with: patient.        CODE STATUS:

## 2022-02-25 NOTE — ANESTHESIA POSTPROCEDURE EVALUATION
Patient: Irwin Esparza    Procedure Summary     Date: 02/25/22 Room / Location:  [facility] /  [facility]    Anesthesia Start: 1019 Anesthesia Stop: 1041    Procedures:       COLONOSCOPY WITH ANESTHESIA (N/A )      ESOPHAGOGASTRODUODENOSCOPY WITH ANESTHESIA (N/A ) Diagnosis:       Gastroesophageal reflux disease, unspecified whether esophagitis present      Encounter for screening for malignant neoplasm of colon      (Gastroesophageal reflux disease, unspecified whether esophagitis present [K21.9])      (Encounter for screening for malignant neoplasm of colon [Z12.11])    Surgeons: Omar Philip DO Provider: Daniel Vazquez CRNA    Anesthesia Type: MAC ASA Status: 3          Anesthesia Type: MAC    Vitals  Vitals Value Taken Time   /94 02/25/22 1041   Temp     Pulse 99 02/25/22 1041   Resp 17 02/25/22 1040   SpO2 93 % 02/25/22 1041   Vitals shown include unvalidated device data.        Post Anesthesia Care and Evaluation    Patient location during evaluation: PHASE II  Patient participation: complete - patient participated  Level of consciousness: awake and alert  Pain score: 0  Pain management: adequate  Airway patency: patent  Anesthetic complications: No anesthetic complications  PONV Status: none  Cardiovascular status: acceptable and stable  Respiratory status: acceptable  Hydration status: acceptable

## 2022-02-26 LAB — UREASE TISS QL: NEGATIVE

## 2022-03-05 LAB
CYTO UR: NORMAL
LAB AP CASE REPORT: NORMAL
PATH REPORT.FINAL DX SPEC: NORMAL
PATH REPORT.GROSS SPEC: NORMAL

## 2022-07-11 ENCOUNTER — APPOINTMENT (OUTPATIENT)
Dept: CT IMAGING | Facility: HOSPITAL | Age: 52
End: 2022-07-11

## 2022-07-11 ENCOUNTER — APPOINTMENT (OUTPATIENT)
Dept: CARDIOLOGY | Facility: HOSPITAL | Age: 52
End: 2022-07-11

## 2022-07-11 ENCOUNTER — HOSPITAL ENCOUNTER (EMERGENCY)
Facility: HOSPITAL | Age: 52
Discharge: HOME | End: 2022-07-11
Admitting: EMERGENCY MEDICINE

## 2022-07-11 ENCOUNTER — APPOINTMENT (OUTPATIENT)
Dept: GENERAL RADIOLOGY | Facility: HOSPITAL | Age: 52
End: 2022-07-11

## 2022-07-11 VITALS
HEIGHT: 66 IN | BODY MASS INDEX: 31.98 KG/M2 | SYSTOLIC BLOOD PRESSURE: 116 MMHG | RESPIRATION RATE: 17 BRPM | HEART RATE: 83 BPM | DIASTOLIC BLOOD PRESSURE: 86 MMHG | OXYGEN SATURATION: 97 % | TEMPERATURE: 97.9 F | WEIGHT: 199 LBS

## 2022-07-11 DIAGNOSIS — R74.8 ELEVATED LIPASE: ICD-10-CM

## 2022-07-11 DIAGNOSIS — R07.9 CHEST PAIN, UNSPECIFIED TYPE: Primary | ICD-10-CM

## 2022-07-11 LAB
ALBUMIN SERPL-MCNC: 4.6 G/DL (ref 3.5–5.2)
ALBUMIN/GLOB SERPL: 1.6 G/DL
ALP SERPL-CCNC: 97 U/L (ref 39–117)
ALT SERPL W P-5'-P-CCNC: 40 U/L (ref 1–41)
ANION GAP SERPL CALCULATED.3IONS-SCNC: 9 MMOL/L (ref 5–15)
APTT PPP: 26 SECONDS (ref 24.1–35)
AST SERPL-CCNC: 28 U/L (ref 1–40)
BASOPHILS # BLD AUTO: 0.08 10*3/MM3 (ref 0–0.2)
BASOPHILS NFR BLD AUTO: 1.3 % (ref 0–1.5)
BH CV STRESS BP STAGE 1: NORMAL
BH CV STRESS BP STAGE 2: NORMAL
BH CV STRESS BP STAGE 3: NORMAL
BH CV STRESS DURATION MIN STAGE 1: 3
BH CV STRESS DURATION MIN STAGE 2: 3
BH CV STRESS DURATION MIN STAGE 3: 3
BH CV STRESS DURATION SEC STAGE 1: 0
BH CV STRESS DURATION SEC STAGE 2: 0
BH CV STRESS DURATION SEC STAGE 3: 0
BH CV STRESS ECHO POST STRESS EJECTION FRACTION EF: 65 %
BH CV STRESS GRADE STAGE 1: 10
BH CV STRESS GRADE STAGE 2: 12
BH CV STRESS GRADE STAGE 3: 14
BH CV STRESS HR STAGE 1: 108
BH CV STRESS HR STAGE 2: 129
BH CV STRESS HR STAGE 3: 146
BH CV STRESS METS STAGE 1: 5
BH CV STRESS METS STAGE 2: 7.5
BH CV STRESS METS STAGE 3: 10
BH CV STRESS PROTOCOL 1: NORMAL
BH CV STRESS RECOVERY BP: NORMAL MMHG
BH CV STRESS RECOVERY HR: 89 BPM
BH CV STRESS SPEED STAGE 1: 1.7
BH CV STRESS SPEED STAGE 2: 2.5
BH CV STRESS SPEED STAGE 3: 3.4
BH CV STRESS STAGE 1: 1
BH CV STRESS STAGE 2: 2
BH CV STRESS STAGE 3: 3
BILIRUB SERPL-MCNC: 0.5 MG/DL (ref 0–1.2)
BILIRUB UR QL STRIP: NEGATIVE
BUN SERPL-MCNC: 12 MG/DL (ref 6–20)
BUN/CREAT SERPL: 12.4 (ref 7–25)
CALCIUM SPEC-SCNC: 9.3 MG/DL (ref 8.6–10.5)
CHLORIDE SERPL-SCNC: 102 MMOL/L (ref 98–107)
CLARITY UR: ABNORMAL
CO2 SERPL-SCNC: 26 MMOL/L (ref 22–29)
COLOR UR: YELLOW
CREAT SERPL-MCNC: 0.97 MG/DL (ref 0.76–1.27)
D DIMER PPP FEU-MCNC: 0.28 MCGFEU/ML (ref 0–0.5)
D-LACTATE SERPL-SCNC: 1.6 MMOL/L (ref 0.5–2)
DEPRECATED RDW RBC AUTO: 42.8 FL (ref 37–54)
EGFRCR SERPLBLD CKD-EPI 2021: 93.3 ML/MIN/1.73
EOSINOPHIL # BLD AUTO: 0.14 10*3/MM3 (ref 0–0.4)
EOSINOPHIL NFR BLD AUTO: 2.3 % (ref 0.3–6.2)
ERYTHROCYTE [DISTWIDTH] IN BLOOD BY AUTOMATED COUNT: 13 % (ref 12.3–15.4)
GLOBULIN UR ELPH-MCNC: 2.9 GM/DL
GLUCOSE SERPL-MCNC: 158 MG/DL (ref 65–99)
GLUCOSE UR STRIP-MCNC: NEGATIVE MG/DL
HCT VFR BLD AUTO: 45.7 % (ref 37.5–51)
HGB BLD-MCNC: 15.6 G/DL (ref 13–17.7)
HGB UR QL STRIP.AUTO: NEGATIVE
IMM GRANULOCYTES # BLD AUTO: 0.02 10*3/MM3 (ref 0–0.05)
IMM GRANULOCYTES NFR BLD AUTO: 0.3 % (ref 0–0.5)
INR PPP: 0.95 (ref 0.91–1.09)
KETONES UR QL STRIP: NEGATIVE
LEUKOCYTE ESTERASE UR QL STRIP.AUTO: NEGATIVE
LIPASE SERPL-CCNC: 108 U/L (ref 13–60)
LV EF 2D ECHO EST: 55 %
LYMPHOCYTES # BLD AUTO: 2.03 10*3/MM3 (ref 0.7–3.1)
LYMPHOCYTES NFR BLD AUTO: 33.8 % (ref 19.6–45.3)
MAXIMAL PREDICTED HEART RATE: 167 BPM
MCH RBC QN AUTO: 31.1 PG (ref 26.6–33)
MCHC RBC AUTO-ENTMCNC: 34.1 G/DL (ref 31.5–35.7)
MCV RBC AUTO: 91.2 FL (ref 79–97)
MONOCYTES # BLD AUTO: 0.48 10*3/MM3 (ref 0.1–0.9)
MONOCYTES NFR BLD AUTO: 8 % (ref 5–12)
NEUTROPHILS NFR BLD AUTO: 3.25 10*3/MM3 (ref 1.7–7)
NEUTROPHILS NFR BLD AUTO: 54.3 % (ref 42.7–76)
NITRITE UR QL STRIP: NEGATIVE
NRBC BLD AUTO-RTO: 0 /100 WBC (ref 0–0.2)
NT-PROBNP SERPL-MCNC: 25.1 PG/ML (ref 0–900)
PERCENT MAX PREDICTED HR: 87.43 %
PH UR STRIP.AUTO: >=9 [PH] (ref 5–8)
PLATELET # BLD AUTO: 324 10*3/MM3 (ref 140–450)
PMV BLD AUTO: 9.6 FL (ref 6–12)
POTASSIUM SERPL-SCNC: 4.2 MMOL/L (ref 3.5–5.2)
PROT SERPL-MCNC: 7.5 G/DL (ref 6–8.5)
PROT UR QL STRIP: NEGATIVE
PROTHROMBIN TIME: 12.3 SECONDS (ref 11.9–14.6)
RBC # BLD AUTO: 5.01 10*6/MM3 (ref 4.14–5.8)
SARS-COV-2 RNA PNL SPEC NAA+PROBE: NOT DETECTED
SODIUM SERPL-SCNC: 137 MMOL/L (ref 136–145)
SP GR UR STRIP: 1.01 (ref 1–1.03)
STRESS BASELINE BP: NORMAL MMHG
STRESS BASELINE HR: 65 BPM
STRESS PERCENT HR: 103 %
STRESS POST ESTIMATED WORKLOAD: 10 METS
STRESS POST EXERCISE DUR MIN: 9 MIN
STRESS POST EXERCISE DUR SEC: 0 SEC
STRESS POST PEAK BP: NORMAL MMHG
STRESS POST PEAK HR: 146 BPM
STRESS TARGET HR: 142 BPM
TROPONIN T SERPL-MCNC: <0.01 NG/ML (ref 0–0.03)
TROPONIN T SERPL-MCNC: <0.01 NG/ML (ref 0–0.03)
UROBILINOGEN UR QL STRIP: ABNORMAL
WBC NRBC COR # BLD: 6 10*3/MM3 (ref 3.4–10.8)

## 2022-07-11 PROCEDURE — 93010 ELECTROCARDIOGRAM REPORT: CPT | Performed by: INTERNAL MEDICINE

## 2022-07-11 PROCEDURE — 85025 COMPLETE CBC W/AUTO DIFF WBC: CPT | Performed by: NURSE PRACTITIONER

## 2022-07-11 PROCEDURE — 99284 EMERGENCY DEPT VISIT MOD MDM: CPT

## 2022-07-11 PROCEDURE — 71045 X-RAY EXAM CHEST 1 VIEW: CPT

## 2022-07-11 PROCEDURE — 83605 ASSAY OF LACTIC ACID: CPT | Performed by: NURSE PRACTITIONER

## 2022-07-11 PROCEDURE — 84484 ASSAY OF TROPONIN QUANT: CPT | Performed by: NURSE PRACTITIONER

## 2022-07-11 PROCEDURE — 36415 COLL VENOUS BLD VENIPUNCTURE: CPT

## 2022-07-11 PROCEDURE — 93352 ADMIN ECG CONTRAST AGENT: CPT | Performed by: INTERNAL MEDICINE

## 2022-07-11 PROCEDURE — 93005 ELECTROCARDIOGRAM TRACING: CPT | Performed by: NURSE PRACTITIONER

## 2022-07-11 PROCEDURE — 83880 ASSAY OF NATRIURETIC PEPTIDE: CPT | Performed by: NURSE PRACTITIONER

## 2022-07-11 PROCEDURE — 25010000002 PERFLUTREN 6.52 MG/ML SUSPENSION: Performed by: INTERNAL MEDICINE

## 2022-07-11 PROCEDURE — 87635 SARS-COV-2 COVID-19 AMP PRB: CPT | Performed by: NURSE PRACTITIONER

## 2022-07-11 PROCEDURE — 96374 THER/PROPH/DIAG INJ IV PUSH: CPT

## 2022-07-11 PROCEDURE — 80053 COMPREHEN METABOLIC PANEL: CPT | Performed by: NURSE PRACTITIONER

## 2022-07-11 PROCEDURE — 93350 STRESS TTE ONLY: CPT

## 2022-07-11 PROCEDURE — 93018 CV STRESS TEST I&R ONLY: CPT | Performed by: INTERNAL MEDICINE

## 2022-07-11 PROCEDURE — 93017 CV STRESS TEST TRACING ONLY: CPT

## 2022-07-11 PROCEDURE — 83690 ASSAY OF LIPASE: CPT | Performed by: NURSE PRACTITIONER

## 2022-07-11 PROCEDURE — 81003 URINALYSIS AUTO W/O SCOPE: CPT | Performed by: NURSE PRACTITIONER

## 2022-07-11 PROCEDURE — 85610 PROTHROMBIN TIME: CPT | Performed by: NURSE PRACTITIONER

## 2022-07-11 PROCEDURE — 70450 CT HEAD/BRAIN W/O DYE: CPT

## 2022-07-11 PROCEDURE — 85730 THROMBOPLASTIN TIME PARTIAL: CPT | Performed by: NURSE PRACTITIONER

## 2022-07-11 PROCEDURE — C9803 HOPD COVID-19 SPEC COLLECT: HCPCS | Performed by: NURSE PRACTITIONER

## 2022-07-11 PROCEDURE — 85379 FIBRIN DEGRADATION QUANT: CPT | Performed by: NURSE PRACTITIONER

## 2022-07-11 PROCEDURE — 93350 STRESS TTE ONLY: CPT | Performed by: INTERNAL MEDICINE

## 2022-07-11 PROCEDURE — 93005 ELECTROCARDIOGRAM TRACING: CPT

## 2022-07-11 RX ORDER — ASPIRIN 81 MG/1
324 TABLET, CHEWABLE ORAL ONCE
Status: COMPLETED | OUTPATIENT
Start: 2022-07-11 | End: 2022-07-11

## 2022-07-11 RX ORDER — SODIUM CHLORIDE 0.9 % (FLUSH) 0.9 %
10 SYRINGE (ML) INJECTION AS NEEDED
Status: DISCONTINUED | OUTPATIENT
Start: 2022-07-11 | End: 2022-07-11 | Stop reason: HOSPADM

## 2022-07-11 RX ORDER — NITROGLYCERIN 0.4 MG/1
0.4 TABLET SUBLINGUAL
Status: DISCONTINUED | OUTPATIENT
Start: 2022-07-11 | End: 2022-07-11 | Stop reason: HOSPADM

## 2022-07-11 RX ADMIN — SODIUM CHLORIDE, POTASSIUM CHLORIDE, SODIUM LACTATE AND CALCIUM CHLORIDE 500 ML: 600; 310; 30; 20 INJECTION, SOLUTION INTRAVENOUS at 09:19

## 2022-07-11 RX ADMIN — PERFLUTREN 8.48 MG: 6.52 INJECTION, SUSPENSION INTRAVENOUS at 13:01

## 2022-07-11 RX ADMIN — NITROGLYCERIN 0.4 MG: 0.4 TABLET SUBLINGUAL at 09:25

## 2022-07-11 RX ADMIN — ASPIRIN 324 MG: 81 TABLET, CHEWABLE ORAL at 10:27

## 2022-07-11 NOTE — ED PROVIDER NOTES
Subjective   Patient is a pleasant 51-year-old male who presents here today with complaint of chest pain.  Patient states that he began to have chest pain this morning while on the way to work.  He states that it lasted for approximately 30 minutes.  He states that he had tingling in the left arm at that time.  He also had dizziness.  One of the patient's coworkers took him home.  Once home his wife brought him to the ER for further evaluation.  The patient has a history of four-vessel CABG in June 2019 by Dr. Monzon.  Patient takes a daily baby aspirin.  He does not smoke anymore.  Patient states that his chest feels tight right now.  He denies any shortness of breath, denies any lower extremity swelling or pain.  He presents with wife today for further evaluation.  Prior to arrival the patient did take 1 nitro.  He is unsure if this helped him.      History provided by:  Patient   used: No        Review of Systems   Respiratory: Positive for chest tightness.    Cardiovascular: Positive for chest pain.   Neurological: Positive for dizziness.   All other systems reviewed and are negative.      Past Medical History:   Diagnosis Date   • Acid reflux    • Acid reflux    • Arthritis    • Bell's palsy    • CAD (coronary artery disease)    • CAD (coronary artery disease)    • Chest pain    • Enlarged prostate    • Hernia, ventral    • Hyperlipidemia    • Hypertension    • Kidney stone    • Sleep apnea        No Known Allergies    Past Surgical History:   Procedure Laterality Date   • ADENOIDECTOMY     • CARDIAC CATHETERIZATION N/A 6/14/2019    Procedure: Left Heart Cath;  Surgeon: Satinder Ahumada MD;  Location: [facility] CATH INVASIVE LOCATION;  Service: Cardiology   • COLONOSCOPY N/A 2/25/2022    Procedure: COLONOSCOPY WITH ANESTHESIA;  Surgeon: Omar Philip DO;  Location: [facility] ENDOSCOPY;  Service: Gastroenterology;  Laterality: N/A;  preop; screening   postop; polyps   PCP Nagi Cobb    •  CORONARY ARTERY BYPASS GRAFT N/A 6/28/2019    Procedure: CABG X 4 WITH LIMA, EVH WITH OPEN EXTENSION OF RLE, 57 CHANNEL TMR;  Surgeon: Jax Monzon MD;  Location: [facility] OR;  Service: Cardiothoracic   • ENDOSCOPY N/A 2/25/2022    Procedure: ESOPHAGOGASTRODUODENOSCOPY WITH ANESTHESIA;  Surgeon: Omar Philip DO;  Location:  [facility] ENDOSCOPY;  Service: Gastroenterology;  Laterality: N/A;  preop; GERD  postop; esophagitis   PCP Krish Cobb    • FOREIGN BODY REMOVAL     • FRACTURE SURGERY      LEFT FACIAL BONE FRACTURE REPAIR    • HERNIA REPAIR     • PROSTATE SURGERY     • REPLACEMENT TOTAL KNEE Left    • TONSILLECTOMY     • TONSILLECTOMY     • TOTAL KNEE ARTHROPLASTY         Family History   Problem Relation Age of Onset   • Diabetes Mother    • Heart disease Mother    • Colon polyps Neg Hx    • Colon cancer Neg Hx    • Esophageal cancer Neg Hx        Social History     Socioeconomic History   • Marital status:    Tobacco Use   • Smoking status: Former Smoker     Packs/day: 0.25     Types: Cigarettes     Quit date: 6/28/2019     Years since quitting: 3.0   • Smokeless tobacco: Never Used   Vaping Use   • Vaping Use: Never used   Substance and Sexual Activity   • Alcohol use: Yes     Comment: occ wine    • Drug use: No   • Sexual activity: Defer           Objective   Physical Exam  Vitals and nursing note reviewed.   Constitutional:       Appearance: Normal appearance.   HENT:      Head: Normocephalic and atraumatic.      Mouth/Throat:      Mouth: Mucous membranes are moist.      Pharynx: Oropharynx is clear.   Eyes:      Conjunctiva/sclera: Conjunctivae normal.   Cardiovascular:      Rate and Rhythm: Normal rate and regular rhythm.   Pulmonary:      Effort: Pulmonary effort is normal.      Breath sounds: Normal breath sounds.   Abdominal:      General: Bowel sounds are normal.      Palpations: Abdomen is soft.   Skin:     General: Skin is warm and dry.      Capillary Refill: Capillary refill takes less  "than 2 seconds.   Neurological:      General: No focal deficit present.      Mental Status: He is alert and oriented to person, place, and time.      Comments: Patient is alert and oriented x3, speech clear and appropriate.  No facial droop noted.  No paresthesias noted to face or extremities.  No drift noted to upper or lower extremities.  Proximal strength bilaterally is 5 out of 5 in upper extremities.   strength bilaterally is 5-5 in upper extremities.  Strength is 5 out of 5 in bilateral lower extremities.   Psychiatric:         Mood and Affect: Mood normal.         Behavior: Behavior normal.         Procedures           ED Course  ED Course as of 07/11/22 1445   Mon Jul 11, 2022   1015 Pt sleeping; awakens easily. Pt and family updated on status and plan of care.  [LF]   1205 HEART Score for Major Cardiac Events - MDCalc  Calculated on Jul 11 2022 1:05 PM  4 points -> Moderate Score (4-6 points) Risk of MACE of 12-16.6%. [LF]   1207 Call placed to Dr. Butler.  [LF]   1441 I have discussed patient's case with Dr. Butler.  He has reviewed both EKGs that were performed today.  He recommends a stress test.  A stress test was completed.  This showed low risk for ischemia. [LF]   1442 Patient's other labs are unremarkable.  Lipase was slightly elevated at 108 and he is advised to follow-up his PCP for this. [LF]   1442 YEARS Algorithm for Pulmonary Embolism (PE) - MDCalc  Calculated on Jul 11 2022 3:42 PM  PE excluded -> YEARS algorithm rules out PE (0.43% with symptomatic VTE during 3-month follow-up) [LF]   1443 Wells' Criteria for Pulmonary Embolism - MDCalc  Calculated on Jul 11 2022 3:43 PM  1.5 points -> Low risk group: 1.3% chance of PE in an ED population. Another study assigned scores = 4 as \"PE Unlikely\" and had a 3% incidence of PE. [LF]   1443 At this time the patient will be discharged home in stable condition.  Advised to follow-up PCP in 1 to 2 days for recheck, vies return the ER for any new or " worsening symptoms.  The patient does have an appointment with his cardiologist, Dr. Satinder hernandez, on July 13, 2022 and I have advised him to keep this appointment.  The patient will be discharged home at this time stable condition advised return the ER for any new or worsening symptoms [LF]      ED Course User Index  [LF] Azeb Lawler, NOLAN                                 CT Head Without Contrast   Final Result   1. No acute intracranial process.           This report was finalized on 07/11/2022 09:46 by Dr. Wai Lozano MD.      XR Chest 1 View   Final Result       No acute findings.   This report was finalized on 07/11/2022 09:16 by Dr. Jesus Alvarado MD.        Labs Reviewed   COMPREHENSIVE METABOLIC PANEL - Abnormal; Notable for the following components:       Result Value    Glucose 158 (*)     All other components within normal limits    Narrative:     GFR Normal >60  Chronic Kidney Disease <60  Kidney Failure <15     LIPASE - Abnormal; Notable for the following components:    Lipase 108 (*)     All other components within normal limits   URINALYSIS W/ CULTURE IF INDICATED - Abnormal; Notable for the following components:    Appearance, UA Cloudy (*)     pH, UA >=9.0 (*)     All other components within normal limits    Narrative:     In absence of clinical symptoms, the presence of pyuria, bacteria, and/or nitrites on the urinalysis result does not correlate with infection.  Urine microscopic not indicated.   COVID-19,[facility] IN-HOUSE,NASAL SWAB NO TRANSPORT MEDIA 2 HR TAT - Normal    Narrative:     Fact sheet for providers: https://www.fda.gov/media/517128/download     Fact sheet for patients: https://www.fda.gov/media/853876/download    Test performed by PCR.    Consider negative results in combination with clinical observations, patient history, and epidemiological information.  Fact sheet for providers: https://www.fda.gov/media/397325/download     Fact sheet for patients:  https://www.fda.gov/media/337989/download    Test performed by PCR.    Consider negative results in combination with clinical observations, patient history, and epidemiological information.   PROTIME-INR - Normal   APTT - Normal   BNP (IN-HOUSE) - Normal    Narrative:     Among patients with dyspnea, NT-proBNP is highly sensitive for the detection of acute congestive heart failure. In addition NT-proBNP of <300 pg/ml effectively rules out acute congestive heart failure with 99% negative predictive value.    Results may be falsely decreased if patient taking Biotin.     D-DIMER, QUANTITATIVE - Normal    Narrative:     Reference Range is 0-0.50 MCGFEU/mL. However, results <0.50 MCGFEU/mL tends to rule out DVT or PE. Results >0.50 MCGFEU/mL are not useful in predicting absence or presence of DVT or PE.     TROPONIN (IN-HOUSE) - Normal    Narrative:     Troponin T Reference Range:  <= 0.03 ng/mL-   Negative for AMI  >0.03 ng/mL-     Abnormal for myocardial necrosis.  Clinicians would have to utilize clinical acumen, EKG, Troponin and serial changes to determine if it is an Acute Myocardial Infarction or myocardial injury due to an underlying chronic condition.       Results may be falsely decreased if patient taking Biotin.     LACTIC ACID, PLASMA - Normal   CBC WITH AUTO DIFFERENTIAL - Normal   TROPONIN (IN-HOUSE) - Normal    Narrative:     Troponin T Reference Range:  <= 0.03 ng/mL-   Negative for AMI  >0.03 ng/mL-     Abnormal for myocardial necrosis.  Clinicians would have to utilize clinical acumen, EKG, Troponin and serial changes to determine if it is an Acute Myocardial Infarction or myocardial injury due to an underlying chronic condition.       Results may be falsely decreased if patient taking Biotin.     CBC AND DIFFERENTIAL    Narrative:     The following orders were created for panel order CBC & Differential.  Procedure                               Abnormality         Status                     ---------                                -----------         ------                     CBC Auto Differential[[id number]]        Normal              Final result                 Please view results for these tests on the individual orders.               MDM  Number of Diagnoses or Management Options  Chest pain, unspecified type: new and requires workup  Elevated lipase: new and requires workup     Amount and/or Complexity of Data Reviewed  Clinical lab tests: ordered and reviewed  Tests in the radiology section of CPT®: ordered and reviewed  Tests in the medicine section of CPT®: ordered and reviewed  Discuss the patient with other providers: yes (Dr. Manuela Butler)    Patient Progress  Patient progress: stable      Final diagnoses:   Chest pain, unspecified type   Elevated lipase       ED Disposition  ED Disposition     ED Disposition   Discharge    Condition   Stable    Comment   --             Krish Cobb MD  [address] [id number] [phone]    Call in 1 day           Medication List      No changes were made to your prescriptions during this visit.          Azeb Lawler, APRN  07/11/22 1449

## 2022-07-11 NOTE — ED NOTES
Pt in bed. No s/s distress noted. Denies needs. Asmt completed. Pt on continuous monitoring. Pt states he was on his way to work when he had sudden onset of chest tightness. Describes as tension at this time but better than before. States took 1 nitro. Denies taking ASA. States felt dizzy earlier. Denies SOB/sweats/n. Wife at bedside. POC discussed. Verbalized understanding. Will cont monitoring. Call light in reach. Advised to call any needs.

## 2022-07-13 ENCOUNTER — OFFICE VISIT (OUTPATIENT)
Dept: CARDIOLOGY | Facility: CLINIC | Age: 52
End: 2022-07-13

## 2022-07-13 VITALS
HEART RATE: 76 BPM | SYSTOLIC BLOOD PRESSURE: 124 MMHG | BODY MASS INDEX: 32.95 KG/M2 | OXYGEN SATURATION: 99 % | HEIGHT: 66 IN | DIASTOLIC BLOOD PRESSURE: 90 MMHG | WEIGHT: 205 LBS

## 2022-07-13 DIAGNOSIS — R07.89 OTHER CHEST PAIN: ICD-10-CM

## 2022-07-13 DIAGNOSIS — E78.2 MIXED HYPERLIPIDEMIA: Chronic | ICD-10-CM

## 2022-07-13 DIAGNOSIS — I25.10 CORONARY ARTERY DISEASE INVOLVING NATIVE CORONARY ARTERY OF NATIVE HEART WITHOUT ANGINA PECTORIS: Primary | Chronic | ICD-10-CM

## 2022-07-13 DIAGNOSIS — I10 PRIMARY HYPERTENSION: Chronic | ICD-10-CM

## 2022-07-13 PROCEDURE — 93000 ELECTROCARDIOGRAM COMPLETE: CPT | Performed by: INTERNAL MEDICINE

## 2022-07-13 PROCEDURE — 99214 OFFICE O/P EST MOD 30 MIN: CPT | Performed by: INTERNAL MEDICINE

## 2022-07-13 RX ORDER — MULTIPLE VITAMINS W/ MINERALS TAB 9MG-400MCG
1 TAB ORAL DAILY
COMMUNITY

## 2022-07-13 NOTE — PROGRESS NOTES
Subjective:     Encounter Date:07/13/2022      Patient ID: Irwin Esparza is a 51 y.o. male.    Chief Complaint: Routine follow-up coronary artery disease  History of Present Illness  Mr. Esaprza is a 51-year-old who I last saw in the office on 07/07/2021. By way of review, he was admitted in 06/2019 for unstable angina, and a cardiac catheterization I performed showed multivessel disease, so he underwent a 4-vessel CABG on 06/20/2019 with Dr. Monzon. At our visit last year, his only complaint was being sleepy all the time, but he also admitted he was not compliant with his CPAP. Other than recommending he start to use the CPAP, no other changes in medications were recommended by myself at that time.     Just a few days ago, he presented to the emergency department on 07/11/2022 for chest discomfort that documentation reported lasted for about 30 minutes. The ED provider states the patient had tingling in his left arm at the time, also with dizziness, and that he had a coworker take him home. His wife, who is a nurse on our telemetry floor, brought him to the ER for evaluation. He was complaining of ongoing chest tightness, but no shortness of breath. He had taken the nitroglycerin prior to arrival, and told the ER provider he was unsure whether that helped. Serial biomarkers were negative, so he did undergo a treadmill stress echocardiogram. He demonstrated excellent exercise capacity going 9 minutes on Ezequiel protocol, and did not experience any angina with that. It was noted that his baseline blood pressure was 148/96, and increased to values of 182/91 during the first stage of exercise.    The patient comes to the clinic today accompanied by his wife (MATT Akins) who contributes to his history. He reports that he has not had any episodes of chest tightness, dizziness, and arm tingling since presenting to the ER. He states that he has been having numbness, and tingling in his left arm from the shoulder down,  "and attributes this possibly to a neuropathic process.  He reports that he had significant chest tightness when he presented to the ER. He denies the tightness did not remind him of how he felt back in 2019 when he first came in and we met. He reports that he took 1 nitroglycerin at home, and it did not affect his chest tightness. He adds that the tightness resolved on its own eventually after arriving in ER.    His wife reports that she just came home from work in the morning, and laid down. She states that Rick came in, and said he needed nitroglycerin, and the blood pressure machine for the patient. The blood pressure machine did not work due to a dead battery. She states that the patient \"looked bad.\" He informed her he did not feel right. She adds that he looked \"spacey.\" He told her he felt like everything was \"closing in on him.\" She then brought him to the emergency room. He was sent home later that day after negative work-up detailed above. His wife states that the patient told her that he also felt at work that everything was closing in on him; he was dizzy, weak, faint, and had no energy.     The patient presented to work yesterday morning, 07/12/2022, felt dizzy, turned around and went home. He describes the dizziness as a feeling of something going on in his ears. He denies having a cold, being dehydrated, spending more time in the heat or a change in medication to attribute to the dizziness. He feels \"a little off,\" today. He denies feeling his heart skipping, racing, or anything funny with the heartbeat today or on 07/11/2022 when he was feeling the chest discomfort. He notes that he has been sleeping excessively for the last several days.    His wife reports his blood pressure at home runs in the 150s to 160s systolic using an automated blood pressure machine.      The following portions of the patient's history were reviewed and updated as appropriate: allergies, current medications, past family " history, past medical history, past social history, past surgical history and problem list.    Review of Systems   Constitutional: Negative for malaise/fatigue.   Cardiovascular: Positive for chest pain. Negative for claudication, dyspnea on exertion, leg swelling, near-syncope, orthopnea, palpitations, paroxysmal nocturnal dyspnea and syncope.   Respiratory: Negative for shortness of breath.    Hematologic/Lymphatic: Does not bruise/bleed easily.   Neurological: Positive for dizziness and paresthesias (Left arm tingling.).           Current Outpatient Medications:   •  acetaminophen (TYLENOL) 500 MG tablet, Take 1,000 mg by mouth 2 (Two) Times a Day., Disp: , Rfl:   •  aspirin 81 MG chewable tablet, Chew 1 tablet Daily., Disp: 30 tablet, Rfl: 0  •  atorvastatin (LIPITOR) 80 MG tablet, Take 80 mg by mouth Daily. Take 1/2 tablet by mouth every morning, Disp: , Rfl:   •  carboxymethylcellulose (REFRESH PLUS) 0.5 % solution, Administer 1 drop to both eyes As Needed., Disp: , Rfl:   •  Cholecalciferol 2000 units tablet, Take 1 tablet by mouth 2 (Two) Times a Day., Disp: , Rfl:   •  glucosamine-chondroitin 500-400 MG capsule capsule, Take  by mouth 2 (Two) Times a Day., Disp: , Rfl:   •  lisinopril (PRINIVIL,ZESTRIL) 10 MG tablet, Take 1 tablet by mouth Daily., Disp: 30 tablet, Rfl: 11  •  multivitamin with minerals tablet tablet, Take 1 tablet by mouth Daily., Disp: , Rfl:   •  nitroglycerin (NITROSTAT) 0.4 MG SL tablet, Place 1 tablet under the tongue Every 5 (Five) Minutes As Needed for Chest Pain. Take no more than 3 doses in 15 minutes., Disp: 30 tablet, Rfl: 12  •  pantoprazole (PROTONIX) 20 MG EC tablet, Take 20 mg by mouth Daily. 40 Mg Am AND 20 IN PM, Disp: , Rfl:   •  valACYclovir (VALTREX) 1000 MG tablet, TAKE 2 TABLETS BY MOUTH TWICE DAILY FOR 1 DOSE AS NEEDED FOR COLD SORES, Disp: , Rfl:   •  vitamin B-12 (CYANOCOBALAMIN) 500 MCG tablet, Take 500 mcg by mouth Daily., Disp: , Rfl:   •  Zinc 50 MG capsule,  Take  by mouth., Disp: , Rfl:        Objective:      Vitals:    07/13/22 1334   BP: 124/90   Pulse: 76   SpO2: 99%     Vitals and nursing note reviewed.   Constitutional:       General: Not in acute distress.     Appearance: Not in distress.   Neck:      Vascular: No carotid bruit, JVD or JVR. JVD normal.   Pulmonary:      Effort: Pulmonary effort is normal.      Breath sounds: Normal breath sounds.   Cardiovascular:      Normal rate. Regular rhythm.      Murmurs: There is no murmur.      No gallop. No rub.   Pulses:     Intact distal pulses.   Edema:     Peripheral edema absent.   Skin:     General: Skin is warm and dry.   Neurological:      Mental Status: Alert, oriented to person, place, and time and oriented to person, place and time.         Lab Review:       Independent review of imaging with my interpretation as follows:  I was not the cardiologist who interpreted his stress echocardiogram from 2 days ago. I did personally review all the images myself today, with my interpretation being that all left ventricular wall segments demonstrated appropriate increase in contractility with stress (exercise). I also reviewed all ECGs from the test with a baseline ECG showing normal sinus rhythm with no ST segment changes noted on subsequent ECGs during, and after exercise.    Labs reviewed include troponins that were negative x2 on 07/11/2022. ProBNP was normal. D-dimer was normal. COVID-19 swab was negative. CMP was all normal with the exception of mildly elevated glucose at 158. CBC was completely within normal limits.      ECG 12 Lead    Date/Time: 7/13/2022 1:44 PM  Performed by: Satinder Ahumada MD  Authorized by: Satinder Ahumada MD   Comparison: compared with previous ECG from 7/11/2022  Similar to previous ECG  Rhythm: sinus rhythm  BPM: 76  Q waves: III and aVF    Other findings: non-specific ST-T wave changes and poor R wave progression    Clinical impression: abnormal EKG          Lab Results   Component  "Value Date    CHOL 159 06/14/2019    CHOL 167 07/11/2018     Lab Results   Component Value Date    TRIG 316 (H) 06/14/2019    TRIG 399 (H) 07/11/2018     Lab Results   Component Value Date    HDL 22 (L) 06/14/2019    HDL 27 (L) 07/11/2018     Lab Results   Component Value Date     (H) 06/14/2019     (H) 07/11/2018     No results found for: VLDL  Lab Results   Component Value Date    LDLHDL 3.35 06/14/2019    LDLHDL 2.23 07/11/2018         Assessment/Plan:     Problem List Items Addressed This Visit        Cardiac and Vasculature    HTN (hypertension) (Chronic)    Hyperlipidemia (Chronic)    Coronary artery disease involving native coronary artery of native heart without angina pectoris - Primary (Chronic)    Overview     4v CABG 6/28/19 (initial presentation: UA; abnl stress-> cath with multivessel CAD). LIMA->LAD, SVG->acute marginal->PDA (sequential), and SVG->OM.  Also had 57 channel TMR.           Relevant Orders    ECG 12 Lead      Other Visit Diagnoses     Other chest pain            Assessment/Plan:  1. Coronary artery disease status post CABG in 2019: Overall stable. He did have a bout of chest pain prompting an ER visit 2 days ago, but the symptoms were rather atypical, and associated with dizziness. I reviewed the work-up there thoroughly today, and provided my own interpretation of the images (both from the stress EKGs and echocardiographic pre- and post-stress images) performed on a stress test that day. All appear low-risk, and all lab work-up was negative. He has not had any chest discomfort since then, but did have more dizziness yesterday and today says he \"feels off.\"  I do not think that the symptoms that were occurring were cardiac in nature.  - I encouraged him to follow up with his PCP if the left arm numbness, and/or any of the dizziness episodes continue to consider other sources.  - Otherwise, no change in cardiac therapies at this time --continue aspirin 1 mg daily " indefinitely, high intensity statin, and ACE inhibitor.  Has nitroglycerin for as needed use.    2. Essential hypertension: Some concern has been raised over, perhaps, elevated blood pressure, but all readings while in the ER for several hours 2 days ago seemingly showed a systolic blood pressure averaging in the upper 120s to lower 130s (range from 112 up to 149).  All other readings within our system, including the one my office today, have been acceptable as well.  - For now, continue lisinopril 10 mg daily, but I encouraged him to check some manual cuff readings at home, and cross reference those against his automated cuff, which according to his wife (who is an excellent night charge nurse on our telemetry unit) have been consistently showing elevated pressures (150s-160s/80s-90s). Since these reported home readings do not correspond to what is seen in my office today, nor multiple readings in the ER, I think we need to make sure his home automated device is properly calibrated, particularly before making any changes to therapy based off of it.    3. Atypical chest pain: As per #1 above. No further cardiac w/u recommended at this time.    4. Mixed hyperlipidemia: Continue high-intensity statin to maintain goal LDL < 70.    If his dizziness persist, encouraged him to see his PCP to see if he has any ENT issue or other potential noncardiac causes for it.  I do not feel as though there is an arrhythmia associated, as he was in sinus rhythm throughout his stay in the ER when symptomatic, and also in sinus rhythm on today's ECG.  Therefore, we discussed potential for an outpatient rhythm monitoring device, I feel the yield of such would be very low at the present time.    Follow up in 1 year or sooner with any new or worsening symptoms.    Transcribed from ambient dictation for Satinder Ahumada MD by Ashley Hill.  07/13/22   14:30 CDT    Patient verbalized consent to the visit recording.   I Satinder Ahumada MD have  personally performed the services described in this document as scribed by the above individual, and it is both accurate and complete.   I have edited each component as needed.    Satinder Ahumada MD  7/13/2022  22:39 CDT

## 2022-07-14 LAB
QT INTERVAL: 376 MS
QT INTERVAL: 382 MS
QTC INTERVAL: 424 MS
QTC INTERVAL: 436 MS

## 2023-05-09 ENCOUNTER — APPOINTMENT (OUTPATIENT)
Dept: GENERAL RADIOLOGY | Facility: HOSPITAL | Age: 53
End: 2023-05-09
Payer: OTHER GOVERNMENT

## 2023-05-09 ENCOUNTER — HOSPITAL ENCOUNTER (EMERGENCY)
Facility: HOSPITAL | Age: 53
Discharge: HOME | End: 2023-05-09
Payer: OTHER GOVERNMENT

## 2023-05-09 VITALS
OXYGEN SATURATION: 99 % | WEIGHT: 206 LBS | RESPIRATION RATE: 18 BRPM | DIASTOLIC BLOOD PRESSURE: 86 MMHG | BODY MASS INDEX: 36.5 KG/M2 | HEIGHT: 63 IN | HEART RATE: 86 BPM | SYSTOLIC BLOOD PRESSURE: 121 MMHG | TEMPERATURE: 98.1 F

## 2023-05-09 DIAGNOSIS — S61.011A LACERATION OF RIGHT THUMB WITHOUT FOREIGN BODY WITHOUT DAMAGE TO NAIL, INITIAL ENCOUNTER: Primary | ICD-10-CM

## 2023-05-09 PROCEDURE — 90471 IMMUNIZATION ADMIN: CPT

## 2023-05-09 PROCEDURE — 73130 X-RAY EXAM OF HAND: CPT

## 2023-05-09 PROCEDURE — 90715 TDAP VACCINE 7 YRS/> IM: CPT

## 2023-05-09 PROCEDURE — 99282 EMERGENCY DEPT VISIT SF MDM: CPT

## 2023-05-09 PROCEDURE — 99283 EMERGENCY DEPT VISIT LOW MDM: CPT

## 2023-05-09 PROCEDURE — 25010000002 TETANUS-DIPHTH-ACELL PERTUSSIS 5-2.5-18.5 LF-MCG/0.5 SUSPENSION PREFILLED SYRINGE

## 2023-05-09 PROCEDURE — 0 LIDOCAINE 1 % SOLUTION: Performed by: EMERGENCY MEDICINE

## 2023-05-09 RX ORDER — CEPHALEXIN 500 MG/1
500 CAPSULE ORAL 4 TIMES DAILY
Qty: 28 CAPSULE | Refills: 0 | Status: SHIPPED | OUTPATIENT
Start: 2023-05-09 | End: 2023-05-16

## 2023-05-09 RX ORDER — LIDOCAINE HYDROCHLORIDE 10 MG/ML
10 INJECTION, SOLUTION INFILTRATION; PERINEURAL ONCE
Status: COMPLETED | OUTPATIENT
Start: 2023-05-09 | End: 2023-05-09

## 2023-05-09 RX ORDER — CEPHALEXIN 500 MG/1
500 CAPSULE ORAL 4 TIMES DAILY
Qty: 28 CAPSULE | Refills: 0 | Status: SHIPPED | OUTPATIENT
Start: 2023-05-09 | End: 2023-05-09 | Stop reason: SDUPTHER

## 2023-05-09 RX ADMIN — TETANUS TOXOID, REDUCED DIPHTHERIA TOXOID AND ACELLULAR PERTUSSIS VACCINE, ADSORBED 0.5 ML: 5; 2.5; 8; 8; 2.5 SUSPENSION INTRAMUSCULAR at 19:57

## 2023-05-09 RX ADMIN — LIDOCAINE HYDROCHLORIDE 10 ML: 10 INJECTION, SOLUTION INFILTRATION; PERINEURAL at 20:29

## 2023-05-10 NOTE — ED PROVIDER NOTES
Subjective   History of Present Illness  Patient is a 52-year-old male who presents to the ED today with his wife for a laceration to the ventral aspect of the right thumb.  Reports the other day he dropped a fish cleaning knife between his seats and subsequently forgot about it.  He reports today he was reaching to get something between his seats when it stabbed his thumb.  Unsure of last tetanus vaccination.  Bleeding is controlled.  Cap refill, sensation, pulse, and  strength intact.  Range of motion of the digit is intact.  PMH is significant for GERD, arthritis, CAD, hyperlipidemia, and hypertension.  Differential diagnoses: Laceration, fracture, and other.    History provided by:  Patient   used: No        Review of Systems   Constitutional: Negative.    HENT: Negative.    Eyes: Negative.    Respiratory: Negative.    Cardiovascular: Negative.    Gastrointestinal: Negative.    Genitourinary: Negative.    Musculoskeletal: Positive for arthralgias. Negative for joint swelling.   Skin: Positive for wound.   Neurological: Negative.    Psychiatric/Behavioral: Negative.        Past Medical History:   Diagnosis Date   • Acid reflux    • Acid reflux    • Arthritis    • Bell's palsy    • CAD (coronary artery disease)    • CAD (coronary artery disease)    • Chest pain    • Enlarged prostate    • Hernia, ventral    • Hyperlipidemia    • Hypertension    • Kidney stone    • Sleep apnea        No Known Allergies    Past Surgical History:   Procedure Laterality Date   • ADENOIDECTOMY     • CARDIAC CATHETERIZATION N/A 6/14/2019    Procedure: Left Heart Cath;  Surgeon: Satinder Ahumada MD;  Location: [facility] CATH INVASIVE LOCATION;  Service: Cardiology   • COLONOSCOPY N/A 2/25/2022    Procedure: COLONOSCOPY WITH ANESTHESIA;  Surgeon: Omar Philip DO;  Location: [facility] ENDOSCOPY;  Service: Gastroenterology;  Laterality: N/A;  preop; screening   postop; polyps   PCP Nagi Cobb    • CORONARY ARTERY  BYPASS GRAFT N/A 6/28/2019    Procedure: CABG X 4 WITH LIMA, EVH WITH OPEN EXTENSION OF RLE, 57 CHANNEL TMR;  Surgeon: Jax Monzon MD;  Location: [facility] OR;  Service: Cardiothoracic   • ENDOSCOPY N/A 2/25/2022    Procedure: ESOPHAGOGASTRODUODENOSCOPY WITH ANESTHESIA;  Surgeon: Omar Philip DO;  Location: [facility] ENDOSCOPY;  Service: Gastroenterology;  Laterality: N/A;  preop; GERD  postop; esophagitis   PCP Krish Cobb    • FOREIGN BODY REMOVAL     • FRACTURE SURGERY      LEFT FACIAL BONE FRACTURE REPAIR    • HERNIA REPAIR     • PROSTATE SURGERY     • REPLACEMENT TOTAL KNEE Left    • TONSILLECTOMY     • TONSILLECTOMY     • TOTAL KNEE ARTHROPLASTY         Family History   Problem Relation Age of Onset   • Diabetes Mother    • Heart disease Mother    • Colon polyps Neg Hx    • Colon cancer Neg Hx    • Esophageal cancer Neg Hx        Social History     Socioeconomic History   • Marital status:    Tobacco Use   • Smoking status: Former     Packs/day: 0.25     Types: Cigarettes     Quit date: 6/28/2019     Years since quitting: 3.8   • Smokeless tobacco: Never   Vaping Use   • Vaping Use: Never used   Substance and Sexual Activity   • Alcohol use: Yes     Comment: occ wine    • Drug use: No   • Sexual activity: Defer       Objective   Physical Exam  Vitals and nursing note reviewed.   Constitutional:       General: He is not in acute distress.     Appearance: Normal appearance. He is not ill-appearing, toxic-appearing or diaphoretic.   HENT:      Head: Normocephalic and atraumatic.      Right Ear: External ear normal.      Left Ear: External ear normal.      Nose: Nose normal.   Eyes:      Extraocular Movements: Extraocular movements intact.      Conjunctiva/sclera: Conjunctivae normal.      Pupils: Pupils are equal, round, and reactive to light.   Cardiovascular:      Pulses:           Radial pulses are 2+ on the right side and 2+ on the left side.   Musculoskeletal:      Right hand: Laceration and  tenderness present. No swelling. Normal range of motion. Normal strength. Normal sensation. There is no disruption of two-point discrimination. Normal capillary refill. Normal pulse.        Hands:       Cervical back: Normal range of motion.      Comments: Laceration to the right ventral thumb that does not involve the nail, is in a half-[place] shape; bleeding controlled.   Skin:     General: Skin is warm and dry.      Capillary Refill: Capillary refill takes less than 2 seconds.   Neurological:      Mental Status: He is alert and oriented to person, place, and time. Mental status is at baseline.      GCS: GCS eye subscore is 4. GCS verbal subscore is 5. GCS motor subscore is 6.      Sensory: Sensation is intact. No sensory deficit.   Psychiatric:         Mood and Affect: Mood normal.         Behavior: Behavior normal.         Thought Content: Thought content normal.         Judgment: Judgment normal.       XR Hand 3+ View Right   Final Result   1. No acute bony abnormality.   2. No definite radiopaque foreign body is seen in the soft tissues of   the thumb. The soft tissues are partially obscured by bandaging   material.   3. Degenerative changes, as described.   4. Corticated bone fragments distal to the ulnar styloid process may be   old injuries or unfused growth centers.   5. Small bony exostosis arising from the distal first metacarpal on the   radial side.       This report was finalized on 05/09/2023 19:52 by Dr. Marco Foley MD.        Medications   Tetanus-Diphth-Acell Pertussis (BOOSTRIX) injection 0.5 mL (0.5 mL Intramuscular Given 5/9/23 1957)   lidocaine (XYLOCAINE) 1 % injection 10 mL (10 mL Injection Given 5/9/23 2029)       Laceration Repair    Date/Time: 5/9/2023 9:03 PM  Performed by: Rosaura Johnson APRN  Authorized by: Rosaura Johnson APRN     Consent:     Consent obtained:  Verbal    Consent given by:  Patient    Risks, benefits, and alternatives were discussed: yes      Risks  discussed:  Infection, pain, need for additional repair, poor cosmetic result and poor wound healing    Alternatives discussed:  No treatment and delayed treatment  Universal protocol:     Procedure explained and questions answered to patient or proxy's satisfaction: yes      Imaging studies available: yes      Patient identity confirmed:  Verbally with patient  Anesthesia:     Anesthesia method:  Local infiltration    Local anesthetic:  Lidocaine 1% w/o epi  Laceration details:     Location:  Finger    Finger location:  R thumb    Length (cm):  8  Pre-procedure details:     Preparation:  Patient was prepped and draped in usual sterile fashion and imaging obtained to evaluate for foreign bodies  Treatment:     Area cleansed with:  Saline, chlorhexidine and povidone-iodine    Amount of cleaning:  Standard    Irrigation solution:  Sterile saline  Skin repair:     Repair method:  Sutures    Suture size:  4-0    Suture material:  Nylon    Suture technique:  Simple interrupted    Number of sutures:  8  Approximation:     Approximation:  Close  Repair type:     Repair type:  Simple  Post-procedure details:     Dressing:  Adhesive bandage    Procedure completion:  Tolerated well, no immediate complications               ED Course                                           Medical Decision Making  Patient is a 52-year-old male who presents to the ED today with his wife for a laceration to the ventral aspect of the right thumb.  Reports the other day he dropped a fish cleaning knife between his seats and subsequently forgot about it.  He reports today he was reaching to get something between his seats when it stabbed his thumb.  Unsure of last tetanus vaccination.  Bleeding is controlled.  Cap refill, sensation, pulse, and  strength intact.  Range of motion of the digit is intact.  PMH is significant for GERD, arthritis, CAD, hyperlipidemia, and hypertension.  Differential diagnoses: Laceration, fracture, and other.      Tdap was given in the ED.   Wound was thoroughly cleansed with sterile saline and betadine by nursing staff.    XR hand reveals no acute bony abnormality.  2. No definite radiopaque foreign body is seen in the soft tissues of  the thumb. The soft tissues are partially obscured by bandaging  material.  3. Degenerative changes, as described.  4. Corticated bone fragments distal to the ulnar styloid process may be  old injuries or unfused growth centers.  5. Small bony exostosis arising from the distal first metacarpal on the  radial side.    Results discussed at bedside.     Laceration repair performed; please see procedure note.   Pt to be discharged home with instructions for care, follow up, and removal time. Return precautions given. Dressing applied with thumb splint. VS reassuring, pt discharged in stable condition.   He was sent home on Keflex to help prevent infection as the wound was initially very dirty. I have provided him with a phone number for ortho for f/u as needed as well. He will f/u with his PCP for suture removal or return to the ED.    Laceration of right thumb without foreign body without damage to nail, initial encounter: acute illness or injury  Amount and/or Complexity of Data Reviewed  Radiology: ordered.      Risk  Prescription drug management.          Final diagnoses:   Laceration of right thumb without foreign body without damage to nail, initial encounter       ED Disposition  ED Disposition     ED Disposition   Discharge    Condition   Stable    Comment   --             Krish Cobb MD  [address] [facility] [address] [facility]  [facility] [id number] [phone]          Daniel Messina MD  [address] [facility] [id number] [phone]               Medication List      New Prescriptions    cephalexin 500 MG capsule  Commonly known as: KEFLEX  Take 1 capsule by mouth 4 (Four) Times a Day for 7 days.           Where to Get Your Medications      These medications were sent to [facility]  #[id number] - [place], [place] - [address] AT [address] - [phone]  - [phone] FX  [address], [place] [address]    Phone: [phone]   · cephalexin 500 MG capsule          Rosaura Johnson, APRN  05/13/23 0953

## 2023-05-10 NOTE — DISCHARGE INSTRUCTIONS
Today we have placed sutures to your thumb, these will need to come out in about 7 days.  Please keep the area clean and dry. We are placing a splint to your finger.  Please make sure you follow-up with the orthopedic doctor regarding the findings on your x-ray and return to the ED with any new, worsening, or persistent symptoms.  I am sending in antibiotics.  To help prevent infection.

## 2023-07-26 ENCOUNTER — OFFICE VISIT (OUTPATIENT)
Dept: CARDIOLOGY | Facility: CLINIC | Age: 53
End: 2023-07-26
Payer: OTHER GOVERNMENT

## 2023-07-26 VITALS
SYSTOLIC BLOOD PRESSURE: 138 MMHG | WEIGHT: 211.4 LBS | OXYGEN SATURATION: 95 % | HEART RATE: 94 BPM | DIASTOLIC BLOOD PRESSURE: 96 MMHG | BODY MASS INDEX: 37.46 KG/M2 | HEIGHT: 63 IN

## 2023-07-26 DIAGNOSIS — I10 PRIMARY HYPERTENSION: Chronic | ICD-10-CM

## 2023-07-26 DIAGNOSIS — E78.2 MIXED HYPERLIPIDEMIA: Chronic | ICD-10-CM

## 2023-07-26 DIAGNOSIS — I25.10 CORONARY ARTERY DISEASE INVOLVING NATIVE CORONARY ARTERY OF NATIVE HEART WITHOUT ANGINA PECTORIS: Primary | Chronic | ICD-10-CM

## 2023-07-26 PROCEDURE — 93000 ELECTROCARDIOGRAM COMPLETE: CPT | Performed by: NURSE PRACTITIONER

## 2023-07-26 PROCEDURE — 99214 OFFICE O/P EST MOD 30 MIN: CPT | Performed by: NURSE PRACTITIONER

## 2023-07-26 NOTE — PROGRESS NOTES
Subjective:     Encounter Date: 7/26/2023      Patient ID: Irwin Esparza is a 52 y.o. male.    Chief Complaint: Routine follow-up coronary artery disease    History of Present Illness    Mr. Esparza is a 52-year-old following up for CAD.    By way of review, he was admitted in 06/2019 for unstable angina, and a cardiac catheterization Dr. Ahumada performed showed multivessel disease, so he underwent a 4-vessel CABG on 06/20/2019 with Dr. Monzon.    He last followed up in July 2022.  Just a couple of days prior to that visit he had been in the ER for chest discomfort that lasted about 30 minutes.  His wife, who is a nurse on our telemetry floor, brought him to the ER for evaluation.  He was having tightness, was unsure if nitroglycerin helped.  Cardiac enzymes were negative.  He ended up having a low risk stress test.  Dr. Ahumada did review this test as well.    When he followed up with Dr. Ahumada he was doing well.  There was some question about the accuracy of his blood pressure machine at home.  He was instructed to have his wife check this manually if possible.  He was having some tingling in his arm that was possibly felt to be neuropathic pain.  Overall, he was not having any symptoms concerning for cardiac origin.    The patient presents today for routine cardiology follow-up and is feeling well.  He states over the past month he might get slightly more winded with exertion on some days, but not others.  This is not progressively worsening.  He remains very active and denies any decline in his stamina.  He denies chest discomfort, edema, orthopnea, PND, palpitations, syncope or presyncope.  He states his systolic blood pressures typically in the 130s.  [facility].  Has not required nitroglycerin since last visit.      The following portions of the patient's history were reviewed and updated as appropriate: allergies, current medications, past family history, past medical  history, past social history, past surgical history and problem list.    Review of Systems   Constitutional: Negative for malaise/fatigue.   Cardiovascular:  Positive for dyspnea on exertion. Negative for chest pain, claudication, leg swelling, near-syncope, orthopnea, palpitations, paroxysmal nocturnal dyspnea and syncope.   Respiratory:  Negative for cough.    Hematologic/Lymphatic: Does not bruise/bleed easily.   Musculoskeletal:  Negative for falls.   Gastrointestinal:  Negative for bloating.   Neurological:  Negative for dizziness, light-headedness and weakness.         Current Outpatient Medications:     acetaminophen (TYLENOL) 500 MG tablet, Take 2 tablets by mouth 2 (Two) Times a Day., Disp: , Rfl:     aspirin 81 MG chewable tablet, Chew 1 tablet Daily., Disp: 30 tablet, Rfl: 0    atorvastatin (LIPITOR) 80 MG tablet, Take 1 tablet by mouth Daily. Take 1/2 tablet by mouth every morning, Disp: , Rfl:     carboxymethylcellulose (REFRESH PLUS) 0.5 % solution, Administer 1 drop to both eyes As Needed., Disp: , Rfl:     glucosamine-chondroitin 500-400 MG capsule capsule, Take  by mouth 2 (Two) Times a Day., Disp: , Rfl:     lisinopril (PRINIVIL,ZESTRIL) 10 MG tablet, Take 1 tablet by mouth Daily., Disp: 30 tablet, Rfl: 11    nitroglycerin (NITROSTAT) 0.4 MG SL tablet, Place 1 tablet under the tongue Every 5 (Five) Minutes As Needed for Chest Pain. Take no more than 3 doses in 15 minutes., Disp: 30 tablet, Rfl: 12    pantoprazole (PROTONIX) 20 MG EC tablet, Take 1 tablet by mouth Daily. 40 Mg Am AND 20 IN PM, Disp: , Rfl:        Objective:      Vitals:    07/26/23 1445   BP: 138/96   Pulse: 94   SpO2: 95%   Weight - 211 lbs    Vitals and nursing note reviewed.   Constitutional:       General: Not in acute distress.     Appearance: Not in distress.   Neck:      Vascular: No carotid bruit, JVD or JVR. JVD normal.   Pulmonary:      Effort: Pulmonary effort is normal.      Breath sounds: Normal breath sounds.    Cardiovascular:      Normal rate. Regular rhythm.      Murmurs: There is no murmur.   Edema:     Peripheral edema absent.   Skin:     General: Skin is warm and dry.   Neurological:      Mental Status: Alert, oriented to person, place, and time and oriented to person, place and time.       Lab Review:   Lab Results   Component Value Date    GLUCOSE 158 (H) 07/11/2022    BUN 12 07/11/2022    CREATININE 0.97 07/11/2022    EGFR 93.3 07/11/2022    BCR 12.4 07/11/2022    K 4.2 07/11/2022    CO2 26.0 07/11/2022    CALCIUM 9.3 07/11/2022    ALBUMIN 4.60 07/11/2022    BILITOT 0.5 07/11/2022    AST 28 07/11/2022    ALT 40 07/11/2022      Lab Results   Component Value Date    CHOL 159 06/14/2019    TRIG 316 (H) 06/14/2019    HDL 22 (L) 06/14/2019     (H) 06/14/2019        Lab Results   Component Value Date    HGBA1C 5.6 06/14/2019              ECG 12 Lead    Date/Time: 7/26/2023 3:04 PM  Performed by: Pepper Foley APRN  Authorized by: Pepper Foley APRN   Comparison: compared with previous ECG from 7/13/2022  Similar to previous ECG  Comparison to previous ECG: Inferior Q waves - same as previous   Rhythm: sinus rhythm  Ectopy: infrequent PVCs  BPM: 94              Assessment/Plan:     Problem List Items Addressed This Visit          Cardiac and Vasculature    HTN (hypertension) (Chronic)    Hyperlipidemia (Chronic)    Coronary artery disease involving native coronary artery of native heart without angina pectoris - Primary (Chronic)    Overview     4v CABG 6/28/19 (initial presentation: UA; abnl stress-> cath with multivessel CAD). LIMA->LAD, SVG->acute marginal->PDA (sequential), and SVG->OM.  Also had 57 channel TMR.        Assessment/Plan:  1. Coronary artery disease status post CABG in 2019: Stable, no angina.    -Continue current medical therapy including aspirin, high intensity statin, ACE inhibitor, as needed sublingual nitroglycerin (has not required since prior to last visit).     2. Essential  hypertension: Established problem, stable.  Call here or PCP with persistent elevations.  At that point would increase lisinopril from 10 mg to 20 mg.    3. Mixed hyperlipidemia: Continue high-intensity statin to maintain goal LDL < 70.  [facility]      Follow up in 1 year with Dr. Ahumada, or sooner with any new or worsening symptoms.    I spent 32 minutes caring for Irwin on this date of service. This time includes time spent by me in the following activities: preparing for the visit, reviewing tests, obtaining and/or reviewing a separately obtained history, performing a medically appropriate examination and/or evaluation, counseling and educating the patient/family/caregiver, and documenting information in the medical record

## 2023-10-06 ENCOUNTER — TELEPHONE (OUTPATIENT)
Dept: CARDIOLOGY | Facility: CLINIC | Age: 53
End: 2023-10-06
Payer: OTHER GOVERNMENT

## 2023-10-06 NOTE — TELEPHONE ENCOUNTER
I contacted the patient and made him aware.  Understanding verbalized.  He stated he was going to [facility] for  and he will contact us back once he knows more about this.

## 2023-10-06 NOTE — TELEPHONE ENCOUNTER
----- Message from NOLAN Matta sent at 8/1/2023 11:21 AM CDT -----  LDL is elevated at 101. Goal less than 70 given his CAD. In order to reduce his risk for cardiovascular events I would recommend either increasing his atorvastatin to 80 mg nightly or adding Praluent or Repatha. Let me know which he would prefer. Thanks     ----- Message -----  From: Mika Damon MA  Sent: 8/1/2023  11:09 AM CDT  To: NOLAN Matta    Here are the requested labs.  Thank you.

## 2024-06-11 ENCOUNTER — OFFICE VISIT (OUTPATIENT)
Dept: GASTROENTEROLOGY | Facility: CLINIC | Age: 54
End: 2024-06-11
Payer: COMMERCIAL

## 2024-06-11 VITALS
SYSTOLIC BLOOD PRESSURE: 140 MMHG | HEIGHT: 65 IN | TEMPERATURE: 97.8 F | BODY MASS INDEX: 34.66 KG/M2 | DIASTOLIC BLOOD PRESSURE: 90 MMHG | OXYGEN SATURATION: 99 % | WEIGHT: 208 LBS | HEART RATE: 84 BPM

## 2024-06-11 DIAGNOSIS — K21.9 GASTROESOPHAGEAL REFLUX DISEASE, UNSPECIFIED WHETHER ESOPHAGITIS PRESENT: Primary | ICD-10-CM

## 2024-06-11 NOTE — PROGRESS NOTES
Chief Complaint   Patient presents with    Heartburn       PCP: Krish Cobb MD  REFER: No ref. provider found    Subjective     HPI           Having worse issues with GERD   Denies dysphagia  Takes TWICE PER DAY PPI             Past Medical History:   Diagnosis Date    Acid reflux     Acid reflux     Arthritis     Bell's palsy     CAD (coronary artery disease)     CAD (coronary artery disease)     Chest pain     Enlarged prostate     Hernia, ventral     Hyperlipidemia     Hypertension     Kidney stone     Sleep apnea        Past Surgical History:   Procedure Laterality Date    ADENOIDECTOMY      CARDIAC CATHETERIZATION N/A 6/14/2019    Procedure: Left Heart Cath;  Surgeon: Satinder Ahumada MD;  Location: [facility] CATH INVASIVE LOCATION;  Service: Cardiology    COLONOSCOPY N/A 2/25/2022    Procedure: COLONOSCOPY WITH ANESTHESIA;  Surgeon: Omar Philip DO;  Location: [facility] ENDOSCOPY;  Service: Gastroenterology;  Laterality: N/A;  preop; screening   postop; polyps   PCP Nagi Cobb     CORONARY ARTERY BYPASS GRAFT N/A 6/28/2019    Procedure: CABG X 4 WITH LIMA, EVH WITH OPEN EXTENSION OF RLE, 57 CHANNEL TMR;  Surgeon: Jax Monzon MD;  Location: [facility] OR;  Service: Cardiothoracic    ENDOSCOPY N/A 2/25/2022    Procedure: ESOPHAGOGASTRODUODENOSCOPY WITH ANESTHESIA;  Surgeon: Omar Philip DO;  Location: [facility] ENDOSCOPY;  Service: Gastroenterology;  Laterality: N/A;  preop; GERD  postop; esophagitis   PCP Krish Cobb     FOREIGN BODY REMOVAL      FRACTURE SURGERY      LEFT FACIAL BONE FRACTURE REPAIR     HERNIA REPAIR      PROSTATE SURGERY      REPLACEMENT TOTAL KNEE Left     TONSILLECTOMY      TONSILLECTOMY      TOTAL KNEE ARTHROPLASTY         Outpatient Medications Marked as Taking for the 6/11/24 encounter (Office Visit) with Omar Philip DO   Medication Sig Dispense Refill    aspirin (ASPIR) 81 MG EC tablet Take 1 tablet by mouth Daily. 90 tablet 3    aspirin 81 MG chewable tablet Chew 1  tablet Daily. 30 tablet 0    atorvastatin (LIPITOR) 40 MG tablet Take 1 tablet by mouth Daily. 90 tablet 3    atorvastatin (LIPITOR) 80 MG tablet Take 1 tablet by mouth Daily. Take 1/2 tablet by mouth every morning      carboxymethylcellulose (REFRESH PLUS) 0.5 % solution Administer 1 drop to both eyes As Needed.      famotidine (PEPCID) 40 MG tablet Take 1 tablet by mouth every night at bedtime. 30 tablet 0    Ginger, Zingiber officinalis, (GINGER PO) Take  by mouth.      lisinopril (PRINIVIL,ZESTRIL) 20 MG tablet Take 1 tablet by mouth Daily. 90 tablet 3    meloxicam (MOBIC) 7.5 MG tablet Take 1 tablet by mouth once daily 90 tablet 3    nitroglycerin (NITROSTAT) 0.4 MG SL tablet Place 1 tablet under the tongue Every 5 (Five) Minutes As Needed for Chest Pain. Take no more than 3 doses in 15 minutes. 30 tablet 12    pantoprazole (Protonix) 40 MG EC tablet Take 1 tablet by mouth 2 (Two) Times a Day. 90 tablet 3    traZODone (DESYREL) 50 MG tablet Take 1 tablet by mouth at bedtime As Needed. 30 tablet 0    traZODone (DESYREL) 50 MG tablet Take 1 tablet by mouth At Night As Needed. 30 tablet 0    TURMERIC PO Take  by mouth Daily.         No Known Allergies    Social History     Socioeconomic History    Marital status:    Tobacco Use    Smoking status: Former     Current packs/day: 0.00     Types: Cigarettes     Quit date: 2019     Years since quittin.9    Smokeless tobacco: Never   Vaping Use    Vaping status: Never Used   Substance and Sexual Activity    Alcohol use: Yes     Comment: occ wine     Drug use: No    Sexual activity: Defer       Review of Systems   Constitutional:  Negative for fever and unexpected weight change.   HENT:  Negative for trouble swallowing.    Respiratory:  Negative for shortness of breath.    Cardiovascular:  Negative for chest pain.   Gastrointestinal:  Negative for abdominal pain and anal bleeding.       Objective     Vitals:    24 1322   BP: 140/90   Pulse: 84  "  Temp: 97.8 °F (36.6 °C)   SpO2: 99%   Weight: 94.3 kg (208 lb)   Height: 165.1 cm (65\")     Body mass index is 34.61 kg/m².    Physical Exam  Constitutional:       Appearance: Normal appearance. He is well-developed.   Eyes:      General: No scleral icterus.  Cardiovascular:      Heart sounds: Normal heart sounds. No murmur heard.  Pulmonary:      Effort: Pulmonary effort is normal.   Abdominal:      General: Bowel sounds are normal. There is no distension.      Palpations: Abdomen is soft.      Tenderness: There is no abdominal tenderness. There is no guarding.   Skin:     General: Skin is warm and dry.      Coloration: Skin is not jaundiced.   Neurological:      Mental Status: He is alert.   Psychiatric:         Behavior: Behavior is cooperative.         Imaging Results (Most Recent)       None            Body mass index is 34.61 kg/m².    Assessment & Plan     Diagnoses and all orders for this visit:    1. Gastroesophageal reflux disease, unspecified whether esophagitis present (Primary)        Suggest taking his PPI before two meals  If still having problems he will call us back     * Surgery not found *        Advised pt to stop use of NSAIDs, Fish Oil, and MV 5 days prior to procedure, per Dr Philip protocol.  Tylenol based products are ok to take.  Pt verbalized understanding.        Omar Philip, DO  06/11/24          There are no Patient Instructions on file for this visit.    "

## 2024-07-31 ENCOUNTER — OFFICE VISIT (OUTPATIENT)
Dept: CARDIOLOGY | Facility: CLINIC | Age: 54
End: 2024-07-31
Payer: COMMERCIAL

## 2024-07-31 VITALS
OXYGEN SATURATION: 98 % | WEIGHT: 205 LBS | HEART RATE: 95 BPM | DIASTOLIC BLOOD PRESSURE: 100 MMHG | BODY MASS INDEX: 34.16 KG/M2 | HEIGHT: 65 IN | SYSTOLIC BLOOD PRESSURE: 150 MMHG

## 2024-07-31 DIAGNOSIS — E78.2 MIXED HYPERLIPIDEMIA: Chronic | ICD-10-CM

## 2024-07-31 DIAGNOSIS — I10 PRIMARY HYPERTENSION: Chronic | ICD-10-CM

## 2024-07-31 DIAGNOSIS — Z95.1 S/P CABG (CORONARY ARTERY BYPASS GRAFT): Primary | ICD-10-CM

## 2024-07-31 DIAGNOSIS — E66.9 OBESITY (BMI 30-39.9): ICD-10-CM

## 2024-07-31 DIAGNOSIS — I25.10 CORONARY ARTERY DISEASE INVOLVING NATIVE CORONARY ARTERY OF NATIVE HEART WITHOUT ANGINA PECTORIS: Chronic | ICD-10-CM

## 2024-07-31 NOTE — PROGRESS NOTES
Subjective:     Encounter Date:07/31/2024      Patient ID: Irwin Esparza is a 53 y.o. male.    Chief Complaint: Follow-up CAD  History of Present Illness  The patient returns today for routine follow-up after having last been seen here by [facility] a year ago. By way review in 06/2019, underwent 4-vessel CABG after presenting with unstable angina. He was doing well at his visit here last year. Continues to be on appropriate medical therapy, so no changes were made. Last year was noted his blood pressure was a little high and he was advised to follow up with his PCP and if it was still elevated, consider increasing dose of lisinopril.    The patient reported forgetting his morning medication today. His blood pressure typically averages around 130/75.  Otherwise, he is doing very well and has no complaints.      The following portions of the patient's history were reviewed and updated as appropriate: allergies, current medications, past family history, past medical history, past social history, past surgical history, and problem list.    Review of Systems   Constitutional: Negative for malaise/fatigue.   Cardiovascular:  Negative for chest pain, claudication, dyspnea on exertion, leg swelling, near-syncope, orthopnea, palpitations, paroxysmal nocturnal dyspnea and syncope.   Respiratory:  Negative for shortness of breath.    Hematologic/Lymphatic: Does not bruise/bleed easily.           Current Outpatient Medications:     acetaminophen (TYLENOL) 500 MG tablet, Take 2 tablets by mouth Daily., Disp: , Rfl:     aspirin (ASPIR) 81 MG EC tablet, Take 1 tablet by mouth Daily., Disp: 90 tablet, Rfl: 3    atorvastatin (LIPITOR) 40 MG tablet, Take 1 tablet by mouth Daily., Disp: 90 tablet, Rfl: 3    carboxymethylcellulose (REFRESH PLUS) 0.5 % solution, Administer 1 drop to both eyes As Needed., Disp: , Rfl:     famotidine (PEPCID) 40 MG tablet, Take 1 tablet by mouth every night at bedtime., Disp: 30 tablet, Rfl: 3     Ginger, Zingiber officinalis, (GINGER PO), Take  by mouth., Disp: , Rfl:     glucosamine-chondroitin 500-400 MG capsule capsule, Take  by mouth 2 (Two) Times a Day., Disp: , Rfl:     lisinopril (PRINIVIL,ZESTRIL) 20 MG tablet, Take 1 tablet by mouth Daily., Disp: 90 tablet, Rfl: 3    nitroglycerin (NITROSTAT) 0.4 MG SL tablet, Place 1 tablet under the tongue Every 5 (Five) Minutes As Needed for Chest Pain. Take no more than 3 doses in 15 minutes., Disp: 30 tablet, Rfl: 12    pantoprazole (Protonix) 40 MG EC tablet, Take 1 tablet by mouth 2 (Two) Times a Day., Disp: 90 tablet, Rfl: 3    traZODone (DESYREL) 50 MG tablet, Take 1 tablet by mouth at bedtime As Needed., Disp: 30 tablet, Rfl: 0    TURMERIC PO, Take  by mouth Daily., Disp: , Rfl:     meloxicam (MOBIC) 7.5 MG tablet, Take 1 tablet by mouth once daily, Disp: 90 tablet, Rfl: 3       Objective:      Vitals:    07/31/24 1506   BP: 150/100   Pulse: 95   SpO2: 98%     Vitals and nursing note reviewed.   Constitutional:       General: Not in acute distress.     Appearance: Not in distress.   Neck:      Vascular: No JVD or JVR. JVD normal.   Pulmonary:      Effort: Pulmonary effort is normal.      Breath sounds: Normal breath sounds.   Cardiovascular:      Normal rate. Regular rhythm.      Murmurs: There is no murmur.      No gallop.  No rub.   Pulses:     Intact distal pulses.   Edema:     Peripheral edema absent.   Skin:     General: Skin is warm and dry.   Neurological:      Mental Status: Alert, oriented to person, place, and time and oriented to person, place and time.       Physical Exam      Lab Review:         ECG 12 Lead    Date/Time: 7/31/2024 3:15 PM  Performed by: Satinder Ahumada MD    Authorized by: Satinder Ahumada MD  Comparison: compared with previous ECG from 7/26/2023  Comparison to previous ECG: Criteria for LVH are now present  Rhythm: sinus rhythm  Rate: normal  BPM: 95  Q waves: III and aVF    QRS axis: normal  Other findings: left ventricular  hypertrophy and poor R wave progression    Clinical impression: abnormal EKG        Results        Results for orders placed during the hospital encounter of 07/11/22    Adult Stress Echo W/ Cont or Stress Agent if Necessary Per Protocol    Interpretation Summary  · Estimated left ventricular EF = 55% Left ventricular systolic function is normal.  · The patient experienced no angina during the stress test.  · Low risk stress test for stress induced myocardial ischemia        All echocardiographic phases visualized including as required with inverted grayscale imaging which shows increase in contractility wall motion and thickness both globally and regionally suggestive of no obvious evidence of stress-induced ischemia    ____________________________________________________________________  Disclaimer:    Despite low risk stress test for stress induced myocardial ischemia, there is a small but definite fraction of patients who will have significant underlying coronary artery disease that needs further evaluation and definitive treatment.    Missing significant coronary artery disease may result in increased morbidity and mortality.  In view of this if any symptoms such as chest pain, shortness of breath, increasing weakness, feeling dizzy, passing out, palpitations, cold sweats etc.,to seek immediate medical help.    Stress test is intrinsically limited and therefore the results do not confirm or rule out presence of coronary artery disease and need to be interpreted on the basis of presentation and overall clinical picture.  ______________________________________________________________________        Assessment/Plan:     Problem List Items Addressed This Visit (all established, stable)         Cardiac and Vasculature    HTN (hypertension) (Chronic)    Hyperlipidemia (Chronic)    Coronary artery disease involving native coronary artery of native heart without angina pectoris (Chronic)    Overview     4v CABG 6/28/19  (initial presentation: UA; abnl stress-> cath with multivessel CAD). LIMA->LAD, SVG->acute marginal->PDA (sequential), and SVG->OM.  Also had 57 channel TMR.         S/P CABG (coronary artery bypass graft) - Primary       Endocrine and Metabolic    Obesity (BMI 30-39.9)         Recommendations/plans:    Assessment & Plan    The patient's condition has significantly improved. He is currently in the process of transitioning to his primary care physician, so this encounter documentation will be forwarded to his new primary care physician, Dr. Ford. He is currently receiving appropriate medical therapy and reports no symptoms. No further testing is deemed necessary at this time. He is advised to maintain his lifelong regimen of aspirin 81 mg, a high-intensity statin, and antihypertensive medications, aimed at maintaining a target LDL of less than 70, with a preferably target LDL of less than 55. It is noteworthy that his blood pressure is elevated today due to forgetting to take his medication this morning. However, he reports average daily blood pressures in the 130s over 70s on his current regimen, thus no changes are recommended. He is advised to discuss his annual cholesterol checks with Dr. Ford to achieve and maintain goals outlined above.    Follow-up  A follow-up visit is scheduled for 1 year from now, or earlier if new or worsening symptoms emerge.      Satinder Ahumada MD  07/31/2024  18:26 CDT    Transcribed from ambient dictation for Satinder Ahumada MD by Satinder Ahumada MD.  07/31/24   18:26 CDT    Patient or patient representative verbalized consent to the visit recording.

## 2025-05-23 NOTE — PROGRESS NOTES
Chief Complaint  BPH    Subjective          Irwin Esparza presents to [facility]   Patient presents today with lower urinary tract symptoms felt to be related to his prostate.  See symptom score below under data  for specific symptomatology, severity as well as bother.  He is bothered mostly by irritative lower urinary tract symptoms.  Frequency and urgency are his big complaints.  Patient underwent a UroLift by Dr. Garcia 12/9/2020.  Patient denies gross hematuria                Current Outpatient Medications:     amphetamine-dextroamphetamine XR (ADDERALL XR) 20 MG 24 hr capsule, Take 1 capsule by mouth Every Morning, Disp: 30 capsule, Rfl: 0    aspirin (Aspirin Low Dose) 81 MG EC tablet, Take 1 tablet by mouth Daily., Disp: 90 tablet, Rfl: 3    atorvastatin (LIPITOR) 80 MG tablet, Take 1 tablet by mouth Daily., Disp: 90 tablet, Rfl: 3    carboxymethylcellulose (REFRESH PLUS) 0.5 % solution, Administer 1 drop to both eyes As Needed., Disp: , Rfl:     famotidine (PEPCID) 40 MG tablet, 1 tablet at bedtime Orally Once a day 90 days, Disp: 90 tablet, Rfl: 3    lisinopril (PRINIVIL,ZESTRIL) 20 MG tablet, Take 1 tablet by mouth Daily., Disp: 90 tablet, Rfl: 3    meloxicam (MOBIC) 7.5 MG tablet, Take 1 tablet by mouth Daily., Disp: 90 tablet, Rfl: 3    nitroglycerin (NITROSTAT) 0.4 MG SL tablet, Place 1 tablet under the tongue Every 5 (Five) Minutes As Needed for Chest Pain. Take no more than 3 doses in 15 minutes., Disp: 30 tablet, Rfl: 12    pantoprazole (Protonix) 40 MG EC tablet, Take 1 tablet by mouth 2 (Two) Times a Day., Disp: 180 tablet, Rfl: 3    temazepam (RESTORIL) 15 MG capsule, Take 1 capsule by mouth at bedtime As Needed., Disp: 90 capsule, Rfl: 0    traMADol (ULTRAM) 50 MG tablet, Take 1 tablet by mouth Every 8 (Eight) Hours., Disp: 90 tablet, Rfl: 0    TURMERIC PO, Take  by mouth Daily., Disp: , Rfl:     amphetamine-dextroamphetamine XR (ADDERALL XR) 10 MG 24 hr capsule, Take  1 capsule by mouth Daily (Patient not taking: Reported on 6/5/2025), Disp: 30 capsule, Rfl: 0    atorvastatin (LIPITOR) 40 MG tablet, Take 1 tablet by mouth Daily. (Patient not taking: Reported on 6/5/2025), Disp: 90 tablet, Rfl: 3    famotidine (PEPCID) 40 MG tablet, Take 1 tablet by mouth every night at bedtime. (Patient not taking: Reported on 6/5/2025), Disp: 90 tablet, Rfl: 3    Ginger, Zingiber officinalis, (GINGER PO), Take  by mouth. (Patient not taking: Reported on 6/5/2025), Disp: , Rfl:     glucosamine-chondroitin 500-400 MG capsule capsule, Take  by mouth 2 (Two) Times a Day. (Patient not taking: Reported on 6/5/2025), Disp: , Rfl:     meloxicam (MOBIC) 7.5 MG tablet, Take 1 tablet by mouth once daily (Patient not taking: Reported on 6/5/2025), Disp: 90 tablet, Rfl: 3    nitroglycerin (NITROSTAT) 0.4 MG SL tablet, Take 1 tablet by mouth Daily As Needed. Take no more than 3 doses in 15 minutes. (Patient not taking: Reported on 6/5/2025), Disp: 30 tablet, Rfl: 0    nitroglycerin (NITROSTAT) 0.4 MG SL tablet, Place 1 tablet under the tongue Every 15 (Fifteen) Minutes As Needed for up to 3 doses. (Patient not taking: Reported on 6/5/2025), Disp: 25 tablet, Rfl: 3    temazepam (RESTORIL) 15 MG capsule, Take 1 capsule by mouth every night at bedtime. (Patient not taking: Reported on 6/5/2025), Disp: 90 capsule, Rfl: 0    temazepam (RESTORIL) 15 MG capsule, Take 1 capsule by mouth At Night As Needed. (Patient not taking: Reported on 6/5/2025), Disp: 90 capsule, Rfl: 0    traMADol (ULTRAM) 50 MG tablet, Take 1 tablet by mouth Every 8 (Eight) Hours As Needed. (Patient not taking: Reported on 6/5/2025), Disp: 90 tablet, Rfl: 0  Past Medical History:   Diagnosis Date    Acid reflux     Acid reflux     Arthritis     Bell's palsy     CAD (coronary artery disease)     CAD (coronary artery disease)     Chest pain     Enlarged prostate     Hernia, ventral     Hyperlipidemia     Hypertension     Kidney stone     Sleep  "apnea      Past Surgical History:   Procedure Laterality Date    ADENOIDECTOMY      CARDIAC CATHETERIZATION N/A 6/14/2019    Procedure: Left Heart Cath;  Surgeon: Satinder Ahumada MD;  Location: [facility] CATH INVASIVE LOCATION;  Service: Cardiology    COLONOSCOPY N/A 2/25/2022    Procedure: COLONOSCOPY WITH ANESTHESIA;  Surgeon: Omar Philip DO;  Location: [facility] ENDOSCOPY;  Service: Gastroenterology;  Laterality: N/A;  preop; screening   postop; polyps   PCP Nagi Cobb     CORONARY ARTERY BYPASS GRAFT N/A 6/28/2019    Procedure: CABG X 4 WITH LIMA, EVH WITH OPEN EXTENSION OF RLE, 57 CHANNEL TMR;  Surgeon: Jax Monzon MD;  Location: [facility] OR;  Service: Cardiothoracic    ENDOSCOPY N/A 2/25/2022    Procedure: ESOPHAGOGASTRODUODENOSCOPY WITH ANESTHESIA;  Surgeon: Omar Philip DO;  Location: [facility] ENDOSCOPY;  Service: Gastroenterology;  Laterality: N/A;  preop; GERD  postop; esophagitis   PCP Krish Cobb     FOREIGN BODY REMOVAL      FRACTURE SURGERY      LEFT FACIAL BONE FRACTURE REPAIR     HERNIA REPAIR      PROSTATE SURGERY      REPLACEMENT TOTAL KNEE Left     TONSILLECTOMY      TONSILLECTOMY      TOTAL KNEE ARTHROPLASTY             Review of Systems      Objective   PHYSICAL EXAM  Vital Signs:   Temp 97 °F (36.1 °C)   Ht 165.1 cm (65\")   Wt 87.2 kg (192 lb 3.2 oz)   BMI 31.98 kg/m²     Physical Exam      DATA  Result Review :              Results for orders placed or performed in visit on 06/05/25   POC Urinalysis Dipstick, Multipro    Collection Time: 06/05/25  8:31 AM    Specimen: Urine   Result Value Ref Range    Color Yellow Yellow, Straw, Dark Yellow, Idania    Clarity, UA Clear Clear    Glucose, UA Negative Negative mg/dL    Bilirubin Negative Negative    Ketones, UA Negative Negative    Specific Gravity  1.020 1.005 - 1.030    Blood, UA Moderate (A) Negative    pH, Urine 7.0 5.0 - 8.0    Protein, POC Trace (A) Negative mg/dL    Urobilinogen, UA 0.2 E.U./dL Normal, 0.2 E.U./dL    Nitrite, " UA Negative Negative    Leukocytes Negative Negative     Microscopic Urinalysis  I inspected the urine myself based on the clinical situation including the dipstick urine. The urine is spun in a centrifuge for three minutes. The spun urine shows 7-12 rbc/hpf, none wbc/hpf, none epi/hpf, negative bacteria, negative crystals, and negative casts.           Lab Results   Component Value Date    PSA 0.317 06/04/2025         IPSS Questionnaire (AUA-7):  Incomplete emptying  Over the past month, how often have you had a sensation of not emptying your bladder completely after you finished urinating?: Less than half the time (06/05/25 0829)  Frequency  Over the past month, how often have you had to urinate again less than two hours after you finishied urinating ?: Almost always (06/05/25 0829)  Intermittency  Over the past month, how often have you found you stopped and started again several time when you urinated ?: Less than half the time (06/05/25 0829)  Urgency  Over the last month, how often have you found it difficult  have you found it difficult to postpone urination ?: Almost always (06/05/25 0829)  Weak Stream  Over the past month, how often have you had a weak urinary stream ?: More than half the time (06/05/25 0829)  Straining  Over the past month, how often have you had to push or strain to begin urination ?: Not at all (06/05/25 0829)  Nocturia  Over the past month, how many times did you most typically get up to urinate from the time you went to bed until the time you got up in the morning ?: None (06/05/25 0829)  Quality of life due to urinary symptoms  If you were to spend the rest of your life with your urinary condition the way it is now, how would feel about that?: Mostly dissatisfied (06/05/25 0829)    Scores  Total IPSS Score: (!) 18 (06/05/25 0829)  Total Score = Symptomatic Level: Moderately symptomatic: 8-19 (06/05/25 0829)      Estimation of residual urine via abdominal ultrasound  Residual Urine: 40  ml  Indication: BPH  Position: Supine  Examination: Incremental scanning of the suprapubic area using 3 MHz transducer using copious amounts of acoustic gel.   Findings: An anechoic area was demonstrated which represented the bladder, with measurement of residual urine as noted. I inspected this myself. In that the residual urine was stable or insignificant, no treatment will be necessary at this time.          ASSESSMENT AND PLAN          Problem List Items Addressed This Visit    None  Visit Diagnoses         BPH with elevated PSA and lower urinary tract symptoms    -  Primary    Relevant Orders    PSA DIAGNOSTIC (Completed)    POC Urinalysis Dipstick, Multipro (Completed)      Microscopic hematuria        Relevant Orders    CT Abdomen Pelvis With & Without Contrast              Hematuria is explained to Mr. Esparza including those causes that are most likely and those that are less likely.  Evaluation is recommended considering patient's prior UroLift procedure and the possibility of prostatic urethral stones from the UroLift needs to be ruled out.  This represents an undiagnosed new problem with uncertain prognosis.  The incidence of urological malignancies, primarily bladder and kidney cancers, diagnosed following the evaluation of hematuria varies from 2-5% in the setting of asymptomatic microhematuria in referred populations, reaching up to 10-20% in those with gross hematuria. Options for the evaluation including non-invasive monitoring with watchful waiting, urinary cytologies and other studies are discussed.  The reason for CT as both anatomic and physiologic study is explained as well as the risks of contrast are explained.  Cystoscopy as the definitive lower urinary tract study is discussed especially for small bladder tumors.  The risks of pain and discomfort, infection, and urethral stricture are discussed with the patient including the technique used in the office setting.    The patient voiced no  additional questions and would like to proceed with a full work up.          FOLLOW UP     Return in about 3 weeks (around 6/26/2025) for cystoscopy, CT Urogram.        (Please note that portions of this note were completed with a voice recognition program.)  Owen Grubbs MD  06/05/25  08:58 CDT

## 2025-05-30 ENCOUNTER — TELEPHONE (OUTPATIENT)
Dept: UROLOGY | Facility: CLINIC | Age: 55
End: 2025-05-30
Payer: COMMERCIAL

## 2025-06-04 ENCOUNTER — LAB (OUTPATIENT)
Dept: LAB | Facility: HOSPITAL | Age: 55
End: 2025-06-04
Payer: COMMERCIAL

## 2025-06-04 DIAGNOSIS — R97.20 BPH WITH ELEVATED PSA AND LOWER URINARY TRACT SYMPTOMS: ICD-10-CM

## 2025-06-04 DIAGNOSIS — N40.1 BPH WITH ELEVATED PSA AND LOWER URINARY TRACT SYMPTOMS: ICD-10-CM

## 2025-06-04 LAB — PSA SERPL-MCNC: 0.32 NG/ML (ref 0–4)

## 2025-06-04 PROCEDURE — 36415 COLL VENOUS BLD VENIPUNCTURE: CPT

## 2025-06-04 PROCEDURE — 84153 ASSAY OF PSA TOTAL: CPT

## 2025-06-05 ENCOUNTER — OFFICE VISIT (OUTPATIENT)
Dept: UROLOGY | Facility: CLINIC | Age: 55
End: 2025-06-05
Payer: COMMERCIAL

## 2025-06-05 VITALS — WEIGHT: 192.2 LBS | TEMPERATURE: 97 F | HEIGHT: 65 IN | BODY MASS INDEX: 32.02 KG/M2

## 2025-06-05 DIAGNOSIS — R97.20 BPH WITH ELEVATED PSA AND LOWER URINARY TRACT SYMPTOMS: Primary | ICD-10-CM

## 2025-06-05 DIAGNOSIS — N40.1 BPH WITH ELEVATED PSA AND LOWER URINARY TRACT SYMPTOMS: Primary | ICD-10-CM

## 2025-06-05 DIAGNOSIS — R31.29 MICROSCOPIC HEMATURIA: ICD-10-CM

## 2025-06-05 LAB
BILIRUB BLD-MCNC: NEGATIVE MG/DL
CLARITY, POC: CLEAR
COLOR UR: YELLOW
GLUCOSE UR STRIP-MCNC: NEGATIVE MG/DL
KETONES UR QL: NEGATIVE
LEUKOCYTE EST, POC: NEGATIVE
NITRITE UR-MCNC: NEGATIVE MG/ML
PH UR: 7 [PH] (ref 5–8)
PROT UR STRIP-MCNC: ABNORMAL MG/DL
RBC # UR STRIP: ABNORMAL /UL
SP GR UR: 1.02 (ref 1–1.03)
UROBILINOGEN UR QL: ABNORMAL

## 2025-06-26 ENCOUNTER — HOSPITAL ENCOUNTER (OUTPATIENT)
Dept: CT IMAGING | Facility: HOSPITAL | Age: 55
Discharge: HOME | End: 2025-06-26
Admitting: UROLOGY
Payer: COMMERCIAL

## 2025-06-26 DIAGNOSIS — R31.29 MICROSCOPIC HEMATURIA: ICD-10-CM

## 2025-06-26 PROCEDURE — 74178 CT ABD&PLV WO CNTR FLWD CNTR: CPT

## 2025-06-26 PROCEDURE — 82565 ASSAY OF CREATININE: CPT

## 2025-06-26 PROCEDURE — 25510000001 IOPAMIDOL PER 1 ML: Performed by: UROLOGY

## 2025-06-26 RX ORDER — IOPAMIDOL 755 MG/ML
100 INJECTION, SOLUTION INTRAVASCULAR
Status: COMPLETED | OUTPATIENT
Start: 2025-06-26 | End: 2025-06-26

## 2025-06-26 RX ADMIN — IOPAMIDOL 100 ML: 755 INJECTION, SOLUTION INTRAVENOUS at 08:54

## 2025-06-27 ENCOUNTER — PROCEDURE VISIT (OUTPATIENT)
Dept: UROLOGY | Facility: CLINIC | Age: 55
End: 2025-06-27
Payer: COMMERCIAL

## 2025-06-27 DIAGNOSIS — R31.29 MICROSCOPIC HEMATURIA: Primary | ICD-10-CM

## 2025-06-27 DIAGNOSIS — R39.15 URGENCY OF URINATION: ICD-10-CM

## 2025-06-27 LAB
BILIRUB BLD-MCNC: NEGATIVE MG/DL
CLARITY, POC: CLEAR
COLOR UR: YELLOW
CREAT BLDA-MCNC: 1.1 MG/DL (ref 0.6–1.3)
GLUCOSE UR STRIP-MCNC: NEGATIVE MG/DL
KETONES UR QL: NEGATIVE
LEUKOCYTE EST, POC: NEGATIVE
NITRITE UR-MCNC: NEGATIVE MG/ML
PH UR: 7 [PH] (ref 5–8)
PROT UR STRIP-MCNC: NEGATIVE MG/DL
RBC # UR STRIP: ABNORMAL /UL
SP GR UR: 1.02 (ref 1–1.03)
UROBILINOGEN UR QL: NORMAL

## 2025-06-27 RX ORDER — MIRABEGRON 25 MG/1
25 TABLET, FILM COATED, EXTENDED RELEASE ORAL DAILY
Qty: 30 TABLET | Refills: 1 | Status: SHIPPED | OUTPATIENT
Start: 2025-06-27

## 2025-06-27 NOTE — PROGRESS NOTES
CC: I am here for the doctor to look at my bladder    Cystoscopy procedure note  Pre- operative diagnosis:  Lower urinary tract symptoms    Post operative diagnosis:  Same    Procedure:  The patient was prepped and draped in a normal sterile fashion.  The urethra was anesthetized with 2% lidocaine jelly.  A flexible cystoscope was introduced per urethra.      Anterior urethra: The urethra is patent from meatus to sphincter. There is no urothelial lesion, stone, foreign body or other mass.   Prostatic urethra: Prostate findings on cystoscopy: Prostate gland is inspected.  There is mild lateral lobe enlargement but it does not appear obstructive.  There are no urothelial lesions.  Verumontanum is normal in size and There are no urothelial lesions.  The UroLift implants are not visible  Bladder: The bladder has reasonable capacity with no urothelial lesions. There is no erythema or foreign body. Trabeculation is minimal to mild. The ureteral orifices are orthotopic. No stones are seen and Normal urothelium without excessive erythema, abnormal pigmentation, edema or papillary lesions worrisome for neoplasm.  I do not see UroLift implants in the bladder    Patient tolerated the procedure well    Complications: none    Blood loss: minimal       ASSESSMENT AND PLAN          Problem List Items Addressed This Visit    None  Visit Diagnoses         Microscopic hematuria    -  Primary    Relevant Orders    POC Urinalysis Dipstick, Multipro (Completed)      Urgency of urination        Relevant Medications    Mirabegron ER (Myrbetriq) 25 MG tablet sustained-release 24 hour 24 hr tablet            Return in about 3 months (around 9/27/2025) for Follow up with Daniel Hunt PA-C.  To consider dose adjustment if needed    Owen Grubbs MD  6/27/2025  15:43 CDT

## 2025-08-04 RX ORDER — TEMAZEPAM 15 MG/1
15 CAPSULE ORAL NIGHTLY PRN
Qty: 90 CAPSULE | Refills: 0 | Status: CANCELLED | OUTPATIENT
Start: 2025-08-04

## 2025-08-05 RX ORDER — TEMAZEPAM 15 MG/1
15 CAPSULE ORAL NIGHTLY PRN
Qty: 90 CAPSULE | Refills: 0 | Status: CANCELLED | OUTPATIENT
Start: 2025-08-04

## 2025-08-06 RX ORDER — TEMAZEPAM 15 MG/1
15 CAPSULE ORAL NIGHTLY PRN
Qty: 90 CAPSULE | Refills: 0 | Status: CANCELLED | OUTPATIENT
Start: 2025-08-04

## 2025-08-12 ENCOUNTER — OFFICE VISIT (OUTPATIENT)
Dept: CARDIOLOGY | Facility: CLINIC | Age: 55
End: 2025-08-12
Payer: COMMERCIAL

## 2025-08-12 VITALS
WEIGHT: 192.4 LBS | BODY MASS INDEX: 32.06 KG/M2 | DIASTOLIC BLOOD PRESSURE: 96 MMHG | SYSTOLIC BLOOD PRESSURE: 136 MMHG | HEART RATE: 84 BPM | HEIGHT: 65 IN | OXYGEN SATURATION: 95 %

## 2025-08-12 DIAGNOSIS — E78.2 MIXED HYPERLIPIDEMIA: Chronic | ICD-10-CM

## 2025-08-12 DIAGNOSIS — I25.10 CORONARY ARTERY DISEASE INVOLVING NATIVE CORONARY ARTERY OF NATIVE HEART WITHOUT ANGINA PECTORIS: Primary | Chronic | ICD-10-CM

## 2025-08-12 DIAGNOSIS — I10 PRIMARY HYPERTENSION: Chronic | ICD-10-CM

## 2025-08-12 PROCEDURE — 93000 ELECTROCARDIOGRAM COMPLETE: CPT | Performed by: NURSE PRACTITIONER

## 2025-08-12 PROCEDURE — 99214 OFFICE O/P EST MOD 30 MIN: CPT | Performed by: NURSE PRACTITIONER

## (undated) DEVICE — AORTIC PUNCHES ARE USED TO CREATE A UNIFORM OPENING IN BLOOD VESSELS DURING CARDIOVASCULAR SURGERY. THE VESSEL GRAFT IS INSERTED INTO THE CREATED OPENING AND SUTURED TO THE VESSEL WALL. AORTIC LANCETS ARE USED TO MAKE A SMALL UNIFORM CUT IN A BLOOD VESSEL TO FACILITATE INSERTION OF AN AORTIC PUNCH.  PUNCHES COME IN VARIOUS LENGTHS, DIAMETERS AND TIP CONFIGURATIONS.: Brand: CLEANCUT ROTATING AORTIC PUNCH

## (undated) DEVICE — GLV SURG BIOGEL M LTX PF 7 1/2

## (undated) DEVICE — SNAR POLYP CAPTIVATOR MICROHEX 13 240CM

## (undated) DEVICE — 1/2 FORCE SURGICAL SPRING CLIP: Brand: STEALTH® SPRING CLIP

## (undated) DEVICE — FRCP BX RADJAW4 NDL 2.8 240 STD OG

## (undated) DEVICE — SENSR O2 OXIMAX FNGR A/ 18IN NONSTR

## (undated) DEVICE — INF TL MULIPACK FR6: Brand: INFINITI

## (undated) DEVICE — PK OPN HEART 30

## (undated) DEVICE — E-PACK PROCEDURE KIT: Brand: E-PACK

## (undated) DEVICE — ELECTRD PAD DEFIB A/

## (undated) DEVICE — THE CHANNEL CLEANING BRUSH IS A NYLON FLEXI BRUSH ATTACHED TO A FLEXIBLE PLASTIC SHEATH DESIGNED TO SAFELY REMOVE DEBRIS FROM FLEXIBLE ENDOSCOPES.

## (undated) DEVICE — RESERVOIR,SUCTION,100CC,SILICONE: Brand: MEDLINE

## (undated) DEVICE — Device: Brand: DEFENDO AIR/WATER/SUCTION AND BIOPSY VALVE

## (undated) DEVICE — SOL IRR NACL 0.9PCT BT 1000ML

## (undated) DEVICE — SYS DISTNTION VEIN BONCHEK 300MMHG

## (undated) DEVICE — GLV SURG TRIUMPH MICRO PF LTX 8.5 STRL

## (undated) DEVICE — INTENDED FOR TISSUE SEPARATION, AND OTHER PROCEDURES THAT REQUIRE A SHARP SURGICAL BLADE TO PUNCTURE OR CUT.: Brand: BARD-PARKER ® STAINLESS STEEL BLADES

## (undated) DEVICE — TBG SMPL FLTR LINE NASL 02/C02 A/ BX/100

## (undated) DEVICE — BLD SCLPL BEAVR MINI STR 2BVL 180D LF

## (undated) DEVICE — PK SET UP ANES OPN HEART 30

## (undated) DEVICE — PINNACLE INTRODUCER SHEATH: Brand: PINNACLE

## (undated) DEVICE — Device: Brand: MEDEX

## (undated) DEVICE — PAD GRND REM POLYHESIVE A/ DISP

## (undated) DEVICE — PERCLOSE PROGLIDE™ SUTURE-MEDIATED CLOSURE SYSTEM: Brand: PERCLOSE PROGLIDE™

## (undated) DEVICE — PK CATH CARD 30 CA/4

## (undated) DEVICE — CUFF,BP,DISP,1 TUBE,ADULT,HP: Brand: MEDLINE

## (undated) DEVICE — THE SINGLE USE ETRAP – POLYP TRAP IS USED FOR SUCTION RETRIEVAL OF ENDOSCOPICALLY REMOVED POLYPS.: Brand: ETRAP

## (undated) DEVICE — GLV SURG BIOGEL LTX PF 7 1/2

## (undated) DEVICE — PK CYSTO 30

## (undated) DEVICE — DRAIN,WOUND,ROUND,24FR,5/16",FULL-FLUTED: Brand: MEDLINE

## (undated) DEVICE — DEFOGGER!" ANTI FOG KIT: Brand: DEROYAL

## (undated) DEVICE — GAUZE,SPONGE,4"X4",16PLY,XRAY,STRL,LF: Brand: MEDLINE

## (undated) DEVICE — YANKAUER,BULB TIP WITH VENT: Brand: ARGYLE

## (undated) DEVICE — PK TURNOVER CYSTO RM

## (undated) DEVICE — BLAKE SILICONE DRAIN, 19 FR ROUND, HUBLESS WITH 1/4" BENDABLE TROCAR: Brand: BLAKE

## (undated) DEVICE — Device

## (undated) DEVICE — SUT STL 2/0 CV SH 24IN TPW32

## (undated) DEVICE — CONMED SCOPE SAVER BITE BLOCK, 20X27 MM: Brand: SCOPE SAVER

## (undated) DEVICE — MASK,OXYGEN,MED CONC,ADLT,7' TUB, UC: Brand: PENDING

## (undated) DEVICE — HANDPC DEL FIBR LASER W/SOLOGRIP3 SYS

## (undated) DEVICE — SOLIDIFIER LIQUI LOC PLUS 2000CC

## (undated) DEVICE — VASOVIEW HEMOPRO: Brand: VASOVIEW HEMOPRO

## (undated) DEVICE — CLTH CLENS READYCLEANSE PERI CARE PK/5

## (undated) DEVICE — SUT SILK 2/0 FS BLK 18IN 685G

## (undated) DEVICE — BLAKE SILICONE DRAINS CARDIO CONNECTOR 2:1: Brand: BLAKE

## (undated) DEVICE — STERNUM BLADE, OFFSET (32.0 X 0.8 X 6.3MM)

## (undated) DEVICE — GLV SURG BIOGEL LTX PF 6 1/2

## (undated) DEVICE — CANN CO2/O2 NASL A/

## (undated) DEVICE — PK TURNOVER RM ADV